# Patient Record
Sex: MALE | Race: WHITE | NOT HISPANIC OR LATINO
[De-identification: names, ages, dates, MRNs, and addresses within clinical notes are randomized per-mention and may not be internally consistent; named-entity substitution may affect disease eponyms.]

---

## 2017-07-25 ENCOUNTER — APPOINTMENT (OUTPATIENT)
Dept: OTOLARYNGOLOGY | Facility: CLINIC | Age: 57
End: 2017-07-25

## 2017-07-25 VITALS
SYSTOLIC BLOOD PRESSURE: 143 MMHG | DIASTOLIC BLOOD PRESSURE: 72 MMHG | BODY MASS INDEX: 22.96 KG/M2 | WEIGHT: 155 LBS | HEIGHT: 69 IN | HEART RATE: 84 BPM

## 2017-07-25 DIAGNOSIS — H69.83 OTHER SPECIFIED DISORDERS OF EUSTACHIAN TUBE, BILATERAL: ICD-10-CM

## 2017-07-25 DIAGNOSIS — H61.22 IMPACTED CERUMEN, LEFT EAR: ICD-10-CM

## 2018-02-18 ENCOUNTER — INPATIENT (INPATIENT)
Facility: HOSPITAL | Age: 58
LOS: 10 days | Discharge: HOME | End: 2018-03-01
Attending: INTERNAL MEDICINE | Admitting: INTERNAL MEDICINE
Payer: MEDICARE

## 2018-02-18 VITALS
RESPIRATION RATE: 20 BRPM | OXYGEN SATURATION: 97 % | TEMPERATURE: 99 F | HEART RATE: 81 BPM | SYSTOLIC BLOOD PRESSURE: 186 MMHG | DIASTOLIC BLOOD PRESSURE: 100 MMHG

## 2018-02-18 DIAGNOSIS — N17.9 ACUTE KIDNEY FAILURE, UNSPECIFIED: ICD-10-CM

## 2018-02-18 DIAGNOSIS — G40.409 OTHER GENERALIZED EPILEPSY AND EPILEPTIC SYNDROMES, NOT INTRACTABLE, WITHOUT STATUS EPILEPTICUS: ICD-10-CM

## 2018-02-18 DIAGNOSIS — Z29.9 ENCOUNTER FOR PROPHYLACTIC MEASURES, UNSPECIFIED: ICD-10-CM

## 2018-02-18 DIAGNOSIS — R56.9 UNSPECIFIED CONVULSIONS: ICD-10-CM

## 2018-02-18 DIAGNOSIS — F79 UNSPECIFIED INTELLECTUAL DISABILITIES: ICD-10-CM

## 2018-02-18 LAB
ALBUMIN SERPL ELPH-MCNC: 4.3 G/DL — SIGNIFICANT CHANGE UP (ref 3.3–5)
ALP SERPL-CCNC: 50 U/L — SIGNIFICANT CHANGE UP (ref 40–120)
ALT FLD-CCNC: 30 U/L — SIGNIFICANT CHANGE UP (ref 4–41)
APPEARANCE UR: CLEAR — SIGNIFICANT CHANGE UP
AST SERPL-CCNC: 29 U/L — SIGNIFICANT CHANGE UP (ref 4–40)
BASOPHILS # BLD AUTO: 0.03 K/UL — SIGNIFICANT CHANGE UP (ref 0–0.2)
BASOPHILS NFR BLD AUTO: 0.2 % — SIGNIFICANT CHANGE UP (ref 0–2)
BILIRUB SERPL-MCNC: 0.2 MG/DL — SIGNIFICANT CHANGE UP (ref 0.2–1.2)
BILIRUB UR-MCNC: NEGATIVE — SIGNIFICANT CHANGE UP
BLOOD UR QL VISUAL: NEGATIVE — SIGNIFICANT CHANGE UP
BUN SERPL-MCNC: 29 MG/DL — HIGH (ref 7–23)
CALCIUM SERPL-MCNC: 9.7 MG/DL — SIGNIFICANT CHANGE UP (ref 8.4–10.5)
CHLORIDE SERPL-SCNC: 92 MMOL/L — LOW (ref 98–107)
CK MB BLD-MCNC: 3.88 NG/ML — SIGNIFICANT CHANGE UP (ref 1–6.6)
CK MB BLD-MCNC: SIGNIFICANT CHANGE UP (ref 0–2.5)
CK SERPL-CCNC: 88 U/L — SIGNIFICANT CHANGE UP (ref 30–200)
CO2 SERPL-SCNC: 25 MMOL/L — SIGNIFICANT CHANGE UP (ref 22–31)
COLOR SPEC: SIGNIFICANT CHANGE UP
CREAT SERPL-MCNC: 1.67 MG/DL — HIGH (ref 0.5–1.3)
EOSINOPHIL # BLD AUTO: 0 K/UL — SIGNIFICANT CHANGE UP (ref 0–0.5)
EOSINOPHIL NFR BLD AUTO: 0 % — SIGNIFICANT CHANGE UP (ref 0–6)
GLUCOSE SERPL-MCNC: 112 MG/DL — HIGH (ref 70–99)
GLUCOSE UR-MCNC: NEGATIVE — SIGNIFICANT CHANGE UP
HCT VFR BLD CALC: 42 % — SIGNIFICANT CHANGE UP (ref 39–50)
HGB BLD-MCNC: 13.9 G/DL — SIGNIFICANT CHANGE UP (ref 13–17)
HYALINE CASTS # UR AUTO: SIGNIFICANT CHANGE UP (ref 0–?)
IMM GRANULOCYTES # BLD AUTO: 0.07 # — SIGNIFICANT CHANGE UP
IMM GRANULOCYTES NFR BLD AUTO: 0.6 % — SIGNIFICANT CHANGE UP (ref 0–1.5)
KETONES UR-MCNC: NEGATIVE — SIGNIFICANT CHANGE UP
LEUKOCYTE ESTERASE UR-ACNC: NEGATIVE — SIGNIFICANT CHANGE UP
LYMPHOCYTES # BLD AUTO: 1.62 K/UL — SIGNIFICANT CHANGE UP (ref 1–3.3)
LYMPHOCYTES # BLD AUTO: 13.4 % — SIGNIFICANT CHANGE UP (ref 13–44)
MAGNESIUM SERPL-MCNC: 1.9 MG/DL — SIGNIFICANT CHANGE UP (ref 1.6–2.6)
MCHC RBC-ENTMCNC: 32 PG — SIGNIFICANT CHANGE UP (ref 27–34)
MCHC RBC-ENTMCNC: 33.1 % — SIGNIFICANT CHANGE UP (ref 32–36)
MCV RBC AUTO: 96.8 FL — SIGNIFICANT CHANGE UP (ref 80–100)
MONOCYTES # BLD AUTO: 0.98 K/UL — HIGH (ref 0–0.9)
MONOCYTES NFR BLD AUTO: 8.1 % — SIGNIFICANT CHANGE UP (ref 2–14)
MUCOUS THREADS # UR AUTO: SIGNIFICANT CHANGE UP
NEUTROPHILS # BLD AUTO: 9.43 K/UL — HIGH (ref 1.8–7.4)
NEUTROPHILS NFR BLD AUTO: 77.7 % — HIGH (ref 43–77)
NITRITE UR-MCNC: NEGATIVE — SIGNIFICANT CHANGE UP
NRBC # FLD: 0 — SIGNIFICANT CHANGE UP
PH UR: 7.5 — SIGNIFICANT CHANGE UP (ref 4.6–8)
PHOSPHATE SERPL-MCNC: 4 MG/DL — SIGNIFICANT CHANGE UP (ref 2.5–4.5)
PLATELET # BLD AUTO: 253 K/UL — SIGNIFICANT CHANGE UP (ref 150–400)
PMV BLD: 11.3 FL — SIGNIFICANT CHANGE UP (ref 7–13)
POTASSIUM SERPL-MCNC: 4.5 MMOL/L — SIGNIFICANT CHANGE UP (ref 3.5–5.3)
POTASSIUM SERPL-SCNC: 4.5 MMOL/L — SIGNIFICANT CHANGE UP (ref 3.5–5.3)
PROT SERPL-MCNC: 8 G/DL — SIGNIFICANT CHANGE UP (ref 6–8.3)
PROT UR-MCNC: 20 MG/DL — SIGNIFICANT CHANGE UP
RBC # BLD: 4.34 M/UL — SIGNIFICANT CHANGE UP (ref 4.2–5.8)
RBC # FLD: 12.6 % — SIGNIFICANT CHANGE UP (ref 10.3–14.5)
RBC CASTS # UR COMP ASSIST: SIGNIFICANT CHANGE UP (ref 0–?)
SODIUM SERPL-SCNC: 135 MMOL/L — SIGNIFICANT CHANGE UP (ref 135–145)
SP GR SPEC: 1.01 — SIGNIFICANT CHANGE UP (ref 1–1.04)
TROPONIN T SERPL-MCNC: < 0.06 NG/ML — SIGNIFICANT CHANGE UP (ref 0–0.06)
UROBILINOGEN FLD QL: NORMAL MG/DL — SIGNIFICANT CHANGE UP
VALPROATE SERPL-MCNC: 51.2 UG/ML — SIGNIFICANT CHANGE UP (ref 50–100)
WBC # BLD: 12.13 K/UL — HIGH (ref 3.8–10.5)
WBC # FLD AUTO: 12.13 K/UL — HIGH (ref 3.8–10.5)
WBC UR QL: SIGNIFICANT CHANGE UP (ref 0–?)

## 2018-02-18 PROCEDURE — 99223 1ST HOSP IP/OBS HIGH 75: CPT | Mod: GC

## 2018-02-18 PROCEDURE — 71045 X-RAY EXAM CHEST 1 VIEW: CPT | Mod: 26

## 2018-02-18 PROCEDURE — 70450 CT HEAD/BRAIN W/O DYE: CPT | Mod: 26

## 2018-02-18 RX ORDER — LEVETIRACETAM 250 MG/1
750 TABLET, FILM COATED ORAL EVERY 12 HOURS
Qty: 0 | Refills: 0 | Status: DISCONTINUED | OUTPATIENT
Start: 2018-02-18 | End: 2018-02-19

## 2018-02-18 RX ORDER — HEPARIN SODIUM 5000 [USP'U]/ML
5000 INJECTION INTRAVENOUS; SUBCUTANEOUS EVERY 12 HOURS
Qty: 0 | Refills: 0 | Status: DISCONTINUED | OUTPATIENT
Start: 2018-02-18 | End: 2018-02-20

## 2018-02-18 RX ORDER — LACOSAMIDE 50 MG/1
150 TABLET ORAL EVERY 12 HOURS
Qty: 0 | Refills: 0 | Status: DISCONTINUED | OUTPATIENT
Start: 2018-02-18 | End: 2018-02-20

## 2018-02-18 RX ORDER — LAMOTRIGINE 25 MG/1
250 TABLET, ORALLY DISINTEGRATING ORAL EVERY 12 HOURS
Qty: 0 | Refills: 0 | Status: DISCONTINUED | OUTPATIENT
Start: 2018-02-18 | End: 2018-03-01

## 2018-02-18 RX ORDER — DOCUSATE SODIUM 100 MG
100 CAPSULE ORAL
Qty: 0 | Refills: 0 | Status: DISCONTINUED | OUTPATIENT
Start: 2018-02-18 | End: 2018-03-01

## 2018-02-18 RX ORDER — POLYETHYLENE GLYCOL 3350 17 G/17G
17 POWDER, FOR SOLUTION ORAL DAILY
Qty: 0 | Refills: 0 | Status: DISCONTINUED | OUTPATIENT
Start: 2018-02-18 | End: 2018-03-01

## 2018-02-18 RX ORDER — METRONIDAZOLE 7.5 MG/G
1 GEL VAGINAL
Qty: 0 | Refills: 0 | Status: DISCONTINUED | OUTPATIENT
Start: 2018-02-18 | End: 2018-03-01

## 2018-02-18 RX ORDER — SODIUM CHLORIDE 9 MG/ML
1000 INJECTION INTRAMUSCULAR; INTRAVENOUS; SUBCUTANEOUS
Qty: 0 | Refills: 0 | Status: DISCONTINUED | OUTPATIENT
Start: 2018-02-18 | End: 2018-02-19

## 2018-02-18 RX ORDER — SODIUM CHLORIDE 9 MG/ML
500 INJECTION INTRAMUSCULAR; INTRAVENOUS; SUBCUTANEOUS ONCE
Qty: 0 | Refills: 0 | Status: COMPLETED | OUTPATIENT
Start: 2018-02-18 | End: 2018-02-18

## 2018-02-18 RX ORDER — DIVALPROEX SODIUM 500 MG/1
500 TABLET, DELAYED RELEASE ORAL EVERY 8 HOURS
Qty: 0 | Refills: 0 | Status: DISCONTINUED | OUTPATIENT
Start: 2018-02-18 | End: 2018-03-01

## 2018-02-18 RX ADMIN — DIVALPROEX SODIUM 500 MILLIGRAM(S): 500 TABLET, DELAYED RELEASE ORAL at 21:19

## 2018-02-18 RX ADMIN — SODIUM CHLORIDE 75 MILLILITER(S): 9 INJECTION INTRAMUSCULAR; INTRAVENOUS; SUBCUTANEOUS at 19:20

## 2018-02-18 RX ADMIN — SODIUM CHLORIDE 500 MILLILITER(S): 9 INJECTION INTRAMUSCULAR; INTRAVENOUS; SUBCUTANEOUS at 11:02

## 2018-02-18 NOTE — CONSULT NOTE ADULT - SUBJECTIVE AND OBJECTIVE BOX
Neurology Consult    Name  MAKAYLA CAMARENA    Patient is a 57 year old Male w/ PMHx of intellectual disability, and seizure disorder [facility] w/ seizures. Report of patient having 7 seizures since yesterday. Last two seizures this morning between 8 and 930 as per EMS. The last seizure at 930am "lasted longer, was different, and he vomited with the seizure".  Staff member from group home reports that the recent seizures are not patient's typical seizure.  Patient has not been ill recently, and no history of recent trauma.  	                                                          MEDICATIONS  (STANDING):    MEDICATIONS  (PRN):      Allergies    caffeine (Unknown)  chocolate (Unknown)  No Known Drug Allergies    Intolerances        Objective  Vital Signs Last 24 Hrs  T(C): 37 (18 Feb 2018 10:53), Max: 37 (18 Feb 2018 10:33)  T(F): 98.6 (18 Feb 2018 10:53), Max: 98.6 (18 Feb 2018 10:33)  HR: 73 (18 Feb 2018 13:16) (73 - 81)  BP: 190/88 (18 Feb 2018 13:16) (185/92 - 190/88)  BP(mean): --  RR: 18 (18 Feb 2018 13:16) (18 - 20)  SpO2: 98% (18 Feb 2018 13:16) (97% - 98%)    General Exam   General appearance: No acute distress, well-nourished  Respiratory:    non-labored respirations               Neurological Exam  Mental Status:  alert and oriented x3, fluent speech, following commands, repetition and naming intact    Cranial Nerves: PERRL, EOMI without nystagmus, visual fields intact no facial droop, no dysarthria    Motor:   Tone:   normal               Strength:  Upper extremity                          Delt       Bicep    Tricep                                                  R         5/5        5/5        5/5       5/5                                               L          5/5        5/5        5/5      5/5    Lower extremity                           HF          KE          KF        DF         PF                                               R        5/5        5/5        5/5       5/5       5/5                                               L         5/5        5/5        5/5      5/5        5/5    Pronator drift:   none           Dysmetria: none with finger-to-nose testing  Tremor:  none appreciated at rest or in action    Sensation: intact grossly to light touch    Deep Tendon Reflexes:  2+ throughout  Toes flexor bilaterally       Other Studies    02-18    135  |  92<L>  |  29<H>  ----------------------------<  112<H>  4.5   |  25  |  1.67<H>    Ca    9.7      18 Feb 2018 11:12    TPro  8.0  /  Alb  4.3  /  TBili  0.2  /  DBili  x   /  AST  29  /  ALT  30  /  AlkPhos  50  02-18 02-18    135  |  92<L>  |  29<H>  ----------------------------<  112<H>  4.5   |  25  |  1.67<H>    Ca    9.7      18 Feb 2018 11:12    TPro  8.0  /  Alb  4.3  /  TBili  0.2  /  DBili  x   /  AST  29  /  ALT  30  /  AlkPhos  50  02-18    LIVER FUNCTIONS - ( 18 Feb 2018 11:12 )  Alb: 4.3 g/dL / Pro: 8.0 g/dL / ALK PHOS: 50 u/L / ALT: 30 u/L / AST: 29 u/L / GGT: x             Radiology    CTH:

## 2018-02-18 NOTE — H&P ADULT - ASSESSMENT
57 M with PMH of intellectual disability and seizure disorder BIBEMS from group home presenting w/ increased tonic clonic seizure activity, likely ____. 57 M with PMH of intellectual disability and seizure disorder BIBEMS from group home presenting w/ increased tonic clonic seizure activity, potentially precipitated by a recent viral gastroenteritis or antiepileptic medication changes.

## 2018-02-18 NOTE — ED PROVIDER NOTE - ATTENDING CONTRIBUTION TO CARE
Attending Statement: I have personally seen and examined this patient. I have fully participated in the care of this patient. I have reviewed all pertinent clinical information, including history physical exam, plan and the Resident's note and agree except as noted  see HPI/PE

## 2018-02-18 NOTE — H&P ADULT - ATTENDING COMMENTS
pt seen and examined w/ team 2/18 at 7p  brother at bedside  pt in NAD eating dinner  will f/u w neuro re: AED's  f/u EEG   micheal likely d/t vomiting on day of adm  IV fluids  unclear if szr hx changing d/t advancing age of cerebral palsy pt vs. due to other underlying triggers like infection- will d/w neuro  ct head neg

## 2018-02-18 NOTE — H&P ADULT - PROBLEM SELECTOR PLAN 3
- serum creatinine is significantly elevated from previous admissions  - suspect likely pre-renal given that patient had recent episodes of emesis  - will start trial of IVNS at 75cc/hr x 12 hr, and recheck AM BMP

## 2018-02-18 NOTE — H&P ADULT - PROBLEM SELECTOR PLAN 2
- continue using helmet  - continue with bilateral leg braces while ambulating  - continue fall precautions

## 2018-02-18 NOTE — ED PROVIDER NOTE - PHYSICAL EXAMINATION
Attending  pt pleasant, cooperative, Alert to self, place and time. nontoxic appearing. dry mm w dried vomit on chin. no sign of facial trauma EOMI. no facial asymmetry. normal S1-S2 coarse BS, no retractions. soft nt abdomen. moves all ext. nl  bl hand. nl strength of bl lower ext. no leg swelling. follows commands.

## 2018-02-18 NOTE — ED PROVIDER NOTE - PROGRESS NOTE DETAILS
Called [facility] [phone] asked for supervisor Blanca, asked to call [phone], left a voice mail. Called [phone] spoke with clinical coordinator Bozena (sp?) states that concern pt had a seizure and he "lost consciousness" no further information available. referred to call Blanca. Called again the [phone] left message again Jonathan Weil, PGY1 - spoke anahy/ [facility] () who is familiar with the patient. She relates he had 4 seizures since 2100 yesterday. She describes the two this morning as generalized tonic-clonic seizures with post-ictal periods. This is different from his typical seizure pattern, wherein his gaze appears to go off into the distance and he is briefly unresponsive, but has no tonic-clonic activity. He suffered no falls or trauma recently. He has had no recent complaints such as fever, cough, dysuria, or pain. He was seen in the hospital December 2017 for increased seizure frequency with an increase in his depakote level, and then again 2 weeks ago with an increase in vimpat dose. He is scheduled for an EEG on 2/27. pt remained stable, no further seizure in ED. neurology consulted. IVF given will admit to medicine for PAMELA.

## 2018-02-18 NOTE — H&P ADULT - NSHPREVIEWOFSYSTEMS_GEN_ALL_CORE
ROS  Gen:  no fevers, no chills, no weight loss  HEENT: no vision changes, no hearing loss, no pharyngitis, no oral lesions  Pulm: no cough, no SOB, no sputum production, no wheezing  Cardiac: no chest pain, no SOB, no orthopnea  GI: no abdominal pain, + N, + V, no diarrhea, no constipation  : no dysuria, no hematuria, no decreased urine output  Skin: no skin lesions  Neuro:  no headache, no focal weakness, no numbness, + LOC with seizures, + post-ictal period

## 2018-02-18 NOTE — H&P ADULT - NSHPLABSRESULTS_GEN_ALL_CORE
CBC Full  -  ( 2018 11:12 )  WBC Count : 12.13 K/uL  Hemoglobin : 13.9 g/dL  Hematocrit : 42.0 %  Platelet Count - Automated : 253 K/uL  Mean Cell Volume : 96.8 fL  Mean Cell Hemoglobin : 32.0 pg  Mean Cell Hemoglobin Concentration : 33.1 %  Auto Neutrophil # : 9.43 K/uL  Auto Lymphocyte # : 1.62 K/uL  Auto Monocyte # : 0.98 K/uL  Auto Eosinophil # : 0.00 K/uL  Auto Basophil # : 0.03 K/uL  Auto Neutrophil % : 77.7 %  Auto Lymphocyte % : 13.4 %  Auto Monocyte % : 8.1 %  Auto Eosinophil % : 0.0 %  Auto Basophil % : 0.2 %        135  |  92<L>  |  29<H>  ----------------------------<  112<H>  4.5   |  25  |  1.67<H>    Ca    9.7      2018 11:12    TPro  8.0  /  Alb  4.3  /  TBili  0.2  /  DBili  x   /  AST  29  /  ALT  30  /  AlkPhos  50  02-18    LIVER FUNCTIONS - ( 2018 11:12 )  Alb: 4.3 g/dL / Pro: 8.0 g/dL / ALK PHOS: 50 u/L / ALT: 30 u/L / AST: 29 u/L / GGT: x           Urinalysis Basic - ( 2018 15:06 )    CARDIAC MARKERS ( 2018 11:12 )  x     / < 0.06 ng/mL / 88 u/L / 3.88 ng/mL / x        Color: PALE YELLOW / Appearance: CLEAR / S.014 / pH: 7.5  Gluc: NEGATIVE / Ketone: NEGATIVE  / Bili: NEGATIVE / Urobili: NORMAL mg/dL   Blood: NEGATIVE / Protein: 20 mg/dL / Nitrite: NEGATIVE   Leuk Esterase: NEGATIVE / RBC: 0-2 / WBC 0-2   Sq Epi: x / Non Sq Epi: x / Bacteria: x    < from: CT Head No Cont (18 @ 12:40) >    Valproic Acid Level, Serum (18 @ 11:12)    Valproic Acid Level, Serum: 51.2 ug/mL    IMPRESSION:  No acute intracranial hemorrhage, mass effect or CT evidence of acute   territorial infarct. No change   from 10/27/2014.    Similar-appearing diffuse symmetric cortical thickening and   under-sulcation, consistent with a congenital neuronal migration anomaly.    < end of copied text > CBC Full  -  ( 2018 11:12 )  WBC Count : 12.13 K/uL  Hemoglobin : 13.9 g/dL  Hematocrit : 42.0 %  Platelet Count - Automated : 253 K/uL  Mean Cell Volume : 96.8 fL  Mean Cell Hemoglobin : 32.0 pg  Mean Cell Hemoglobin Concentration : 33.1 %  Auto Neutrophil # : 9.43 K/uL  Auto Lymphocyte # : 1.62 K/uL  Auto Monocyte # : 0.98 K/uL  Auto Eosinophil # : 0.00 K/uL  Auto Basophil # : 0.03 K/uL  Auto Neutrophil % : 77.7 %  Auto Lymphocyte % : 13.4 %  Auto Monocyte % : 8.1 %  Auto Eosinophil % : 0.0 %  Auto Basophil % : 0.2 %        135  |  92<L>  |  29<H>  ----------------------------<  112<H>  4.5   |  25  |  1.67<H>    Ca    9.7      2018 11:12    TPro  8.0  /  Alb  4.3  /  TBili  0.2  /  DBili  x   /  AST  29  /  ALT  30  /  AlkPhos  50  02-18    LIVER FUNCTIONS - ( 2018 11:12 )  Alb: 4.3 g/dL / Pro: 8.0 g/dL / ALK PHOS: 50 u/L / ALT: 30 u/L / AST: 29 u/L / GGT: x           Urinalysis Basic - ( 2018 15:06 )    CARDIAC MARKERS ( 2018 11:12 )  x     / < 0.06 ng/mL / 88 u/L / 3.88 ng/mL / x        Color: PALE YELLOW / Appearance: CLEAR / S.014 / pH: 7.5  Gluc: NEGATIVE / Ketone: NEGATIVE  / Bili: NEGATIVE / Urobili: NORMAL mg/dL   Blood: NEGATIVE / Protein: 20 mg/dL / Nitrite: NEGATIVE   Leuk Esterase: NEGATIVE / RBC: 0-2 / WBC 0-2   Sq Epi: x / Non Sq Epi: x / Bacteria: x    < from: CT Head No Cont (18 @ 12:40) >    Valproic Acid Level, Serum (18 @ 11:12)    Valproic Acid Level, Serum: 51.2 ug/mL    IMPRESSION:  No acute intracranial hemorrhage, mass effect or CT evidence of acute   territorial infarct. No change   from 10/27/2014.    Similar-appearing diffuse symmetric cortical thickening and   under-sulcation, consistent with a congenital neuronal migration anomaly.    < end of copied text >    Labs are notable for leukocytosis of 12.13, with mild neutrophil predominance of 77%. BMP is remarkable for elevation in serum creatinine of 1.67, compared to baseline of 0.8 during previous admissions. Cardiac enzymes were negative. CK was WNL. UA is negative for UTI. Valproic acid level was 51.2, which per neurology team is within the therapeutic range.     Chest X-ray is notable for underinflated but otherwise clear lungs. No pleural effusions or pneumothorax is noted.     CTH found no acute ICH, mass effect or acute territorial infarct.

## 2018-02-18 NOTE — H&P ADULT - NSHPSOCIALHISTORY_GEN_ALL_CORE
Patient living at group home.   No history Patient living at [facility] home.   No history of tobacco, alcohol or drug use

## 2018-02-18 NOTE — H&P ADULT - PROBLEM SELECTOR PLAN 1
Patient has longstanding history of seizures since childhood, and is currently on a 4 AEDs. His previous seizures were characterized with mild shaking/tremors, but pt most recent has been having tonic-clonic seizures.   It is unclear if gastroenteritis or other infectious etiology may have triggered seizure activity vs recent changes in AEDs.   - UA and CXR did not reveal source of infection, thus suspect that leukocytosis is likely reactive from seizure activity   - q4h neuro checks, aspiration precautions, fall precautions, seizure precautions   - will order EEG  - will restart home AED regimen and reassess with Dr. Lennon in AM

## 2018-02-18 NOTE — ED ADULT TRIAGE NOTE - CHIEF COMPLAINT QUOTE
from adult group home, s/p 2 seizures this morning 840 and 930, 2nd seizure more activity then normal. total 5 seizure since yesterday. recent change of medications pmhx cp, seizures

## 2018-02-18 NOTE — CONSULT NOTE ADULT - ATTENDING COMMENTS
Patient was presented on rounds and examined.  He is unable to give a history and is at baseline according to group home escort. Current AED's include depakote 500 bid, lamictal 250 bid, vimpat 150 bid and keppra 750 bid. Last VPA level 51.2.  Undergoing VEEG today.  Will speak with treating neurologist regarding meds. CTH stable with ventriculomegaly and cortical thickening.

## 2018-02-18 NOTE — ED PROVIDER NOTE - OBJECTIVE STATEMENT
Attending  58yo M OWEN from [facility] home co seizure. Report of pt having 7 seizures since yesterday. Last two seizures this morning between 8 and 930 as per EMS. Last seizure at 930am "lasted longer , was different, and he vomited with the seizure" Staff with pt does not know pt or any hx.   pt states that "I had a seizure and I threw up" Now denies any complaints. Denies any nausea, has no pain. Alert to self, place and time. No family w pt.   Denies any recent trauma. no fever.

## 2018-02-18 NOTE — ED PROVIDER NOTE - PMH
External hemorrhoids  c/erica Aug, 2013  Hypertension    Hyponatremia    Mental retardation    Seizure

## 2018-02-18 NOTE — ED ADULT NURSE NOTE - OBJECTIVE STATEMENT
Pt sent from group home for 2 seizures today and 3 yesterday. Pt alert and oriented at this time with staff member at bedside. Pt noted to have vomit on his clothing. Pt denies chest pain, SOB, abdominal pain, or hitting his head.

## 2018-02-18 NOTE — H&P ADULT - HISTORY OF PRESENT ILLNESS
57 M with PMH of intellectual disability and seizure disorder BIBEMS from senior care presenting w/ seizures. Per group home  ([facility]), patient had 4 seizure episodes since 9 pm on 2/17. On morning of admission patient with 2 episodes of tonic clonic seizures with post-ictal period. Patient prior seizure episodes more consistent with absent seizures (briefly unresponsive with staring off into distance). No recent head trauma or falls. Patient not noted to have fevers, chills, nausea, vomiting, diarrhea, or foul smelling urine. Patient's last hospitalization in 12/2017 at which time patient's depakote dosing was increased. Patient had increase in seziure actvity 2 weeks ago at which time his Vimpat dosing was increased. Patient was scheduled to have outpatient EEG on 2/27/2018. 57 M with PMH of intellectual disability and seizure disorder [facility]. Per group home  ([facility]), patient had 4 seizure episodes since 9 pm on 2/17. On morning of admission patient with 2 episodes of tonic clonic seizures with post-ictal period and emesis. Patient prior seizure episodes more consistent with absent seizures (briefly unresponsive with staring off into distance). No recent head trauma or falls. Patient not noted to have fevers, chills, nausea, vomiting, diarrhea, or foul smelling urine. Patient's last hospitalization in 12/2017 at which time patient's depakote dosing was increased. Patient had increase in seziure actvity 2 weeks ago at which time his Vimpat dosing was increased. Patient was scheduled to have outpatient EEG on 2/27/2018.     In ED, T 98, HR: 81, BP: 149/89, RR: 18 with 99% on RA. 57 M with PMH of intellectual disability and seizure disorder BIBEMS from [facility] presenting w/ seizures. Per group home  ([facility]), patient had 4 seizure episodes since 9 pm on 2/17. On morning of admission patient with 2 episodes of tonic clonic seizures with post-ictal period and emesis. Patient prior seizure episodes more consistent with absent seizures (briefly unresponsive with staring off into distance). No recent head trauma or falls. Patient not noted to have fevers, chills, nausea, vomiting, diarrhea, or foul smelling urine. Patient's last hospitalization in 12/2017 at which time patient's depakote dosing was increased. Patient had increase in seziure actvity 2 weeks ago at which time his Vimpat dosing was increased. Patient was scheduled to have outpatient EEG on 2/27/2018.   The patient's brother at baseline states that the patient has had increased seizure activity. Pt in the past had small seizures, characterized by "shaking" that occurred spontaneously and intermittently (every few months). The seizures that the patient experienced this morning was unusual in that the pt had an episode of emesis, LOC and eyes rolling backwards. At baseline, the pt can communicate his name and birthdate. He has difficulty expressing himself. He ambulates with a walker because of unsteady gait, and requires assistance with toileting and feeding.     In ED, T 98, HR: 81, BP: 149/89, RR: 18 with 99% on RA.     Pt's brother- Khoa Blood is 57 M with PMH of intellectual disability and seizure disorder BIBEMS from [facility] presenting w/ seizures. Per group home  ([facility]), patient had 4 seizure episodes since 9 pm on 2/17. On morning of admission patient with 2 episodes of tonic clonic seizures with post-ictal period and emesis. Patient prior seizure episodes more consistent with absent seizures (briefly unresponsive with staring off into distance). No recent head trauma or falls. Patient not noted to have fevers, chills, nausea, vomiting, diarrhea, or foul smelling urine. Patient's last hospitalization in 12/2017 at which time patient's depakote dosing was increased. Patient had increase in seziure actvity 2 weeks ago at which time his Vimpat dosing was increased. Patient was scheduled to have outpatient EEG on 2/27/2018.   The patient's brother at baseline states that the patient has had increased seizure activity. Pt in the past had small seizures, characterized by "shaking" that occurred spontaneously and intermittently (every few months). The seizures that the patient experienced this morning was unusual in that the pt had an episode of emesis, LOC and eyes rolling backwards. At baseline, the pt can communicate his name and birthdate. He has difficulty expressing himself. He ambulates with a walker because of unsteady gait, and requires assistance with toileting and feeding.     In ED, T 98, HR: 81, BP: 149/89, RR: 18 with 99% on RA.

## 2018-02-18 NOTE — H&P ADULT - NSHPPHYSICALEXAM_GEN_ALL_CORE
PHYSICAL EXAM:    General: No acute distress.  HEENT: NCAT. PERRL.  EOMI.  No scleral icterus.  Moist MM.  No oropharyngeal exudates.    Neck: Supple.  Full range of motion.  No JVD.  No lymphadenopathy.   Heart: RRR.  Normal S1 and S2.  No murmurs   Lungs: CTAB. No wheezes, crackles, or rhonchi.    Abdomen: BS+, soft, NT/ND.  No organomegaly.  Skin: Warm and dry.  No rashes.  Extremities: No edema, clubbing, or cyanosis.  2+ peripheral pulses b/l.  Musculoskeletal: No deformities.  No spinal or paraspinal tenderness.  Neuro: A&Ox3.  CN II-XII intact.  5/5 strength in UE and LE b/l.  Tactile sensation intact in UE and LE b/l.  Cerebellar function intact as assessed by finger-to-nose test. PHYSICAL EXAM:    General: No acute distress.  HEENT: NCAT. PERRL.  EOMI.  No scleral icterus.  Moist MM.  No oropharyngeal exudates.    Neck: Supple.  Full range of motion.  No JVD.  No lymphadenopathy. Scars at back of neck suggestive of previous surgery, small mobile spongey mass at posterior neck above C6  Heart: RRR.  Normal S1 and S2.  No murmurs   Lungs: CTAB. No wheezes, crackles, or rhonchi.    Abdomen: BS+, soft, NT/ND.  No organomegaly.  Skin: Warm and dry.  No rashes.  Extremities: No edema, clubbing, or cyanosis.  2+ peripheral pulses b/l.  Musculoskeletal: No deformities.  No spinal or paraspinal tenderness.  Neuro: A&Ox3.  CN II-XII intact.  5/5 strength in UE and LE b/l.  Tactile sensation intact in UE and LE b/l.  Cerebellar function intact as assessed by finger-to-nose test. Responds appropriately to simple questions

## 2018-02-19 LAB
ALBUMIN SERPL ELPH-MCNC: 3.8 G/DL — SIGNIFICANT CHANGE UP (ref 3.3–5)
ALP SERPL-CCNC: 50 U/L — SIGNIFICANT CHANGE UP (ref 40–120)
ALT FLD-CCNC: 25 U/L — SIGNIFICANT CHANGE UP (ref 4–41)
AST SERPL-CCNC: 26 U/L — SIGNIFICANT CHANGE UP (ref 4–40)
BASE EXCESS BLDA CALC-SCNC: 4 MMOL/L — SIGNIFICANT CHANGE UP
BASOPHILS # BLD AUTO: 0.07 K/UL — SIGNIFICANT CHANGE UP (ref 0–0.2)
BASOPHILS NFR BLD AUTO: 0.8 % — SIGNIFICANT CHANGE UP (ref 0–2)
BILIRUB SERPL-MCNC: 0.2 MG/DL — SIGNIFICANT CHANGE UP (ref 0.2–1.2)
BUN SERPL-MCNC: 17 MG/DL — SIGNIFICANT CHANGE UP (ref 7–23)
BUN SERPL-MCNC: 24 MG/DL — HIGH (ref 7–23)
CA-I BLDA-SCNC: 1.15 MMOL/L — SIGNIFICANT CHANGE UP (ref 1.15–1.29)
CALCIUM SERPL-MCNC: 8.9 MG/DL — SIGNIFICANT CHANGE UP (ref 8.4–10.5)
CALCIUM SERPL-MCNC: 9 MG/DL — SIGNIFICANT CHANGE UP (ref 8.4–10.5)
CHLORIDE SERPL-SCNC: 97 MMOL/L — LOW (ref 98–107)
CHLORIDE SERPL-SCNC: 98 MMOL/L — SIGNIFICANT CHANGE UP (ref 98–107)
CK MB BLD-MCNC: 2.68 NG/ML — SIGNIFICANT CHANGE UP (ref 1–6.6)
CK SERPL-CCNC: 80 U/L — SIGNIFICANT CHANGE UP (ref 30–200)
CO2 SERPL-SCNC: 25 MMOL/L — SIGNIFICANT CHANGE UP (ref 22–31)
CO2 SERPL-SCNC: 29 MMOL/L — SIGNIFICANT CHANGE UP (ref 22–31)
CREAT SERPL-MCNC: 0.9 MG/DL — SIGNIFICANT CHANGE UP (ref 0.5–1.3)
CREAT SERPL-MCNC: 1.14 MG/DL — SIGNIFICANT CHANGE UP (ref 0.5–1.3)
EOSINOPHIL # BLD AUTO: 0.08 K/UL — SIGNIFICANT CHANGE UP (ref 0–0.5)
EOSINOPHIL NFR BLD AUTO: 0.9 % — SIGNIFICANT CHANGE UP (ref 0–6)
GLUCOSE BLDA-MCNC: 153 MG/DL — HIGH (ref 70–99)
GLUCOSE SERPL-MCNC: 144 MG/DL — HIGH (ref 70–99)
GLUCOSE SERPL-MCNC: 90 MG/DL — SIGNIFICANT CHANGE UP (ref 70–99)
HCO3 BLDA-SCNC: 28 MMOL/L — HIGH (ref 22–26)
HCT VFR BLD CALC: 36.9 % — LOW (ref 39–50)
HCT VFR BLD CALC: 38.8 % — LOW (ref 39–50)
HCT VFR BLDA CALC: 40.4 % — SIGNIFICANT CHANGE UP (ref 39–51)
HGB BLD-MCNC: 12.4 G/DL — LOW (ref 13–17)
HGB BLD-MCNC: 12.9 G/DL — LOW (ref 13–17)
HGB BLDA-MCNC: 13.1 G/DL — SIGNIFICANT CHANGE UP (ref 13–17)
IMM GRANULOCYTES # BLD AUTO: 0.03 # — SIGNIFICANT CHANGE UP
IMM GRANULOCYTES NFR BLD AUTO: 0.3 % — SIGNIFICANT CHANGE UP (ref 0–1.5)
INR BLD: 1.26 — HIGH (ref 0.88–1.17)
LACTATE BLDA-SCNC: 1.8 MMOL/L — SIGNIFICANT CHANGE UP (ref 0.5–2)
LYMPHOCYTES # BLD AUTO: 3.05 K/UL — SIGNIFICANT CHANGE UP (ref 1–3.3)
LYMPHOCYTES # BLD AUTO: 34.7 % — SIGNIFICANT CHANGE UP (ref 13–44)
MAGNESIUM SERPL-MCNC: 1.7 MG/DL — SIGNIFICANT CHANGE UP (ref 1.6–2.6)
MAGNESIUM SERPL-MCNC: 2.3 MG/DL — SIGNIFICANT CHANGE UP (ref 1.6–2.6)
MCHC RBC-ENTMCNC: 31.7 PG — SIGNIFICANT CHANGE UP (ref 27–34)
MCHC RBC-ENTMCNC: 32.1 PG — SIGNIFICANT CHANGE UP (ref 27–34)
MCHC RBC-ENTMCNC: 33.2 % — SIGNIFICANT CHANGE UP (ref 32–36)
MCHC RBC-ENTMCNC: 33.6 % — SIGNIFICANT CHANGE UP (ref 32–36)
MCV RBC AUTO: 95.3 FL — SIGNIFICANT CHANGE UP (ref 80–100)
MCV RBC AUTO: 95.6 FL — SIGNIFICANT CHANGE UP (ref 80–100)
MONOCYTES # BLD AUTO: 0.88 K/UL — SIGNIFICANT CHANGE UP (ref 0–0.9)
MONOCYTES NFR BLD AUTO: 10 % — SIGNIFICANT CHANGE UP (ref 2–14)
NEUTROPHILS # BLD AUTO: 4.67 K/UL — SIGNIFICANT CHANGE UP (ref 1.8–7.4)
NEUTROPHILS NFR BLD AUTO: 53.3 % — SIGNIFICANT CHANGE UP (ref 43–77)
NRBC # FLD: 0 — SIGNIFICANT CHANGE UP
NRBC # FLD: 0 — SIGNIFICANT CHANGE UP
PCO2 BLDA: 40 MMHG — SIGNIFICANT CHANGE UP (ref 35–48)
PH BLDA: 7.45 PH — SIGNIFICANT CHANGE UP (ref 7.35–7.45)
PHOSPHATE SERPL-MCNC: 2.6 MG/DL — SIGNIFICANT CHANGE UP (ref 2.5–4.5)
PHOSPHATE SERPL-MCNC: 2.9 MG/DL — SIGNIFICANT CHANGE UP (ref 2.5–4.5)
PLATELET # BLD AUTO: 197 K/UL — SIGNIFICANT CHANGE UP (ref 150–400)
PLATELET # BLD AUTO: 240 K/UL — SIGNIFICANT CHANGE UP (ref 150–400)
PMV BLD: 11 FL — SIGNIFICANT CHANGE UP (ref 7–13)
PMV BLD: 11.3 FL — SIGNIFICANT CHANGE UP (ref 7–13)
PO2 BLDA: 105 MMHG — SIGNIFICANT CHANGE UP (ref 83–108)
POTASSIUM BLDA-SCNC: 4 MMOL/L — SIGNIFICANT CHANGE UP (ref 3.4–4.5)
POTASSIUM SERPL-MCNC: 4.1 MMOL/L — SIGNIFICANT CHANGE UP (ref 3.5–5.3)
POTASSIUM SERPL-MCNC: 4.4 MMOL/L — SIGNIFICANT CHANGE UP (ref 3.5–5.3)
POTASSIUM SERPL-SCNC: 4.1 MMOL/L — SIGNIFICANT CHANGE UP (ref 3.5–5.3)
POTASSIUM SERPL-SCNC: 4.4 MMOL/L — SIGNIFICANT CHANGE UP (ref 3.5–5.3)
PROT SERPL-MCNC: 7.2 G/DL — SIGNIFICANT CHANGE UP (ref 6–8.3)
PROTHROM AB SERPL-ACNC: 14.6 SEC — HIGH (ref 9.8–13.1)
RBC # BLD: 3.86 M/UL — LOW (ref 4.2–5.8)
RBC # BLD: 4.07 M/UL — LOW (ref 4.2–5.8)
RBC # FLD: 12.4 % — SIGNIFICANT CHANGE UP (ref 10.3–14.5)
RBC # FLD: 12.7 % — SIGNIFICANT CHANGE UP (ref 10.3–14.5)
SAO2 % BLDA: 97.8 % — SIGNIFICANT CHANGE UP (ref 95–99)
SODIUM BLDA-SCNC: 130 MMOL/L — LOW (ref 136–146)
SODIUM SERPL-SCNC: 137 MMOL/L — SIGNIFICANT CHANGE UP (ref 135–145)
SODIUM SERPL-SCNC: 137 MMOL/L — SIGNIFICANT CHANGE UP (ref 135–145)
TROPONIN T SERPL-MCNC: < 0.06 NG/ML — SIGNIFICANT CHANGE UP (ref 0–0.06)
WBC # BLD: 8.75 K/UL — SIGNIFICANT CHANGE UP (ref 3.8–10.5)
WBC # BLD: 8.78 K/UL — SIGNIFICANT CHANGE UP (ref 3.8–10.5)
WBC # FLD AUTO: 8.75 K/UL — SIGNIFICANT CHANGE UP (ref 3.8–10.5)
WBC # FLD AUTO: 8.78 K/UL — SIGNIFICANT CHANGE UP (ref 3.8–10.5)

## 2018-02-19 PROCEDURE — 95819 EEG AWAKE AND ASLEEP: CPT | Mod: 26

## 2018-02-19 PROCEDURE — 99233 SBSQ HOSP IP/OBS HIGH 50: CPT

## 2018-02-19 PROCEDURE — 93010 ELECTROCARDIOGRAM REPORT: CPT

## 2018-02-19 PROCEDURE — 71045 X-RAY EXAM CHEST 1 VIEW: CPT | Mod: 26

## 2018-02-19 PROCEDURE — 93010 ELECTROCARDIOGRAM REPORT: CPT | Mod: 76

## 2018-02-19 RX ORDER — LEVETIRACETAM 250 MG/1
250 TABLET, FILM COATED ORAL ONCE
Qty: 0 | Refills: 0 | Status: COMPLETED | OUTPATIENT
Start: 2018-02-19 | End: 2018-02-19

## 2018-02-19 RX ORDER — LEVETIRACETAM 250 MG/1
1000 TABLET, FILM COATED ORAL
Qty: 0 | Refills: 0 | Status: DISCONTINUED | OUTPATIENT
Start: 2018-02-19 | End: 2018-02-20

## 2018-02-19 RX ORDER — LEVETIRACETAM 250 MG/1
1000 TABLET, FILM COATED ORAL ONCE
Qty: 0 | Refills: 0 | Status: COMPLETED | OUTPATIENT
Start: 2018-02-19 | End: 2018-02-19

## 2018-02-19 RX ORDER — SODIUM CHLORIDE 9 MG/ML
1000 INJECTION INTRAMUSCULAR; INTRAVENOUS; SUBCUTANEOUS
Qty: 0 | Refills: 0 | Status: DISCONTINUED | OUTPATIENT
Start: 2018-02-19 | End: 2018-02-19

## 2018-02-19 RX ORDER — ACETAMINOPHEN 500 MG
650 TABLET ORAL ONCE
Qty: 0 | Refills: 0 | Status: COMPLETED | OUTPATIENT
Start: 2018-02-19 | End: 2018-02-19

## 2018-02-19 RX ORDER — SODIUM CHLORIDE 9 MG/ML
1000 INJECTION INTRAMUSCULAR; INTRAVENOUS; SUBCUTANEOUS
Qty: 0 | Refills: 0 | Status: DISCONTINUED | OUTPATIENT
Start: 2018-02-19 | End: 2018-02-21

## 2018-02-19 RX ADMIN — METRONIDAZOLE 1 APPLICATION(S): 7.5 GEL VAGINAL at 17:27

## 2018-02-19 RX ADMIN — Medication 650 MILLIGRAM(S): at 23:55

## 2018-02-19 RX ADMIN — Medication 100 MILLIGRAM(S): at 17:27

## 2018-02-19 RX ADMIN — Medication 1 TABLET(S): at 23:18

## 2018-02-19 RX ADMIN — DIVALPROEX SODIUM 500 MILLIGRAM(S): 500 TABLET, DELAYED RELEASE ORAL at 23:18

## 2018-02-19 RX ADMIN — LEVETIRACETAM 250 MILLIGRAM(S): 250 TABLET, FILM COATED ORAL at 19:50

## 2018-02-19 RX ADMIN — METRONIDAZOLE 1 APPLICATION(S): 7.5 GEL VAGINAL at 06:49

## 2018-02-19 RX ADMIN — Medication 100 MILLIGRAM(S): at 06:44

## 2018-02-19 RX ADMIN — POLYETHYLENE GLYCOL 3350 17 GRAM(S): 17 POWDER, FOR SOLUTION ORAL at 12:11

## 2018-02-19 RX ADMIN — Medication 650 MILLIGRAM(S): at 23:25

## 2018-02-19 RX ADMIN — HEPARIN SODIUM 5000 UNIT(S): 5000 INJECTION INTRAVENOUS; SUBCUTANEOUS at 06:44

## 2018-02-19 RX ADMIN — HEPARIN SODIUM 5000 UNIT(S): 5000 INJECTION INTRAVENOUS; SUBCUTANEOUS at 17:27

## 2018-02-19 RX ADMIN — LAMOTRIGINE 250 MILLIGRAM(S): 25 TABLET, ORALLY DISINTEGRATING ORAL at 19:49

## 2018-02-19 RX ADMIN — Medication 1 TABLET(S): at 12:11

## 2018-02-19 RX ADMIN — LEVETIRACETAM 400 MILLIGRAM(S): 250 TABLET, FILM COATED ORAL at 13:58

## 2018-02-19 RX ADMIN — DIVALPROEX SODIUM 500 MILLIGRAM(S): 500 TABLET, DELAYED RELEASE ORAL at 06:44

## 2018-02-19 RX ADMIN — LACOSAMIDE 150 MILLIGRAM(S): 50 TABLET ORAL at 06:44

## 2018-02-19 RX ADMIN — LEVETIRACETAM 750 MILLIGRAM(S): 250 TABLET, FILM COATED ORAL at 06:44

## 2018-02-19 RX ADMIN — LAMOTRIGINE 250 MILLIGRAM(S): 25 TABLET, ORALLY DISINTEGRATING ORAL at 08:07

## 2018-02-19 RX ADMIN — DIVALPROEX SODIUM 500 MILLIGRAM(S): 500 TABLET, DELAYED RELEASE ORAL at 14:55

## 2018-02-19 RX ADMIN — SODIUM CHLORIDE 250 MILLILITER(S): 9 INJECTION INTRAMUSCULAR; INTRAVENOUS; SUBCUTANEOUS at 20:14

## 2018-02-19 RX ADMIN — LACOSAMIDE 150 MILLIGRAM(S): 50 TABLET ORAL at 17:27

## 2018-02-19 NOTE — PROGRESS NOTE ADULT - ATTENDING COMMENTS
pt seen and examined  agree w/ above  in addition pt getting video EEG  resident spoke w/ neuro in house- getting 1gm of keppra extra; aed's being adjusted  labs better

## 2018-02-19 NOTE — CHART NOTE - NSCHARTNOTEFT_GEN_A_CORE
Rapid response called for seizure    Patient at the time of rapid was on EEG; family witnessed seizure episode and alerted RN; rapid called  At the time of presentation, he no longer had any seizure activity however was postictal but responsive  Vitals showed he was bradycardic - this was not usual for him at baseline  We got an EKG which showed bradycardia with AV dissociation  Neurology was called - did not think it was meds related or seizure related  CCU NP was called at bedside to look at EKG - agreed that it was AV dissociation  He was transferred to CCU for further management    Labs were obtained  CBC BMP CARDIAC ENZYMES PROLACTIN LACTATE    Patient to go to CCU - likely will need pacer after cardiology discussed with EP

## 2018-02-19 NOTE — CHART NOTE - NSCHARTNOTEFT_GEN_A_CORE
Event note    Was made aware by the MAR that a RRT was called for elevated bp in the 200s systolic and bradycardia (~30s). Pt was transferred to CCU level of care and temporary pacer placed by the cardiac team. Pt was reported to have two GTC seizure events, one right before the procedure and the second during. I examined the pt prior these two events, and pt was at his baseline mental status and neurological status [facility]. He was AAOx2, following commands, attentive and interactive. On interval assessment after witnessed GTCs, pt was again alert and oriented to self and place, moving all extremities, no lethargy or change in neuro exam.    Discussed with Cardio to repeat CT head when deemed stable to leave the unit. Will proceed with a low threshold to electively intubate and sedate with propofol if pt has another seizure episode. Will defer for now as pt's neurological exam is stable.   Depakote level ordered, not able to be added on to current labs. Will Follow up.     Spectra [id number] Event note    I was made aware by the MAR that a RRT was called for elevated bp in the 200s systolic and bradycardia (~30s). Pt was transferred to CCU level of care and temporary pacer placed by the cardiac team. Pt was reported to have two GTC seizure events, one right before the pacer placement and the second during the procedure. I examined the pt prior these two gtc events and pt was at his baseline mental status and neurological status according to the aid at the bedside involved in his care at his group home. He was AAOx2, following commands, attentive and interactive. On interval assessment after witnessed GTCs, pt was again alert and oriented to self and place, moving all extremities, no lethargy or change in neuro exam.    Discussed with Cardio to repeat CT head when deemed stable to leave the unit. Will proceed with a low threshold to electively intubate and sedate with propofol if pt has another seizure episode. Will defer for now as pt's neurological exam is stable.     Stat Depakote level ordered, not able to be added on to current labs.     Spectra [id number]

## 2018-02-19 NOTE — PROCEDURE NOTE - NSINFORMCONSENT_GEN_A_CORE
Benefits, risks, and possible complications of procedure explained to patient/caregiver who verbalized understanding and gave written consent./brother

## 2018-02-19 NOTE — CONSULT NOTE ADULT - ASSESSMENT
57M with intellectual disability and long standing seizure disorder presents with increased seizures now s/p RRT for seizure and resulting complete heart block, unclear etiology but patient is hemodynamically stable at present.    D/W Dr. Solorzano, cardio fellow will transfer to CCU for closer monitoring and transvenous pacing.

## 2018-02-19 NOTE — PROGRESS NOTE ADULT - PROBLEM SELECTOR PLAN 3
- Serum creatinine was significantly elevated from previous admissions, now normalized/  - Monitor BMP

## 2018-02-19 NOTE — PROGRESS NOTE ADULT - SUBJECTIVE AND OBJECTIVE BOX
For Night coverage 7pm-7am: NS- page 1443 Team1-3, page [phone] Team4 & Care Model  Sat/[place] Coverage 12pm-7pm: NS- page 1443 for Team1-4, LINCOLN- pager forwarded to covering Resident    CONTACT INFO:  SIVA Perez MD  Internal Medicine PGY1  Pager: [phone]    MAKAYLA CAMARENA  57y  Male    Patient is a 57y old  Male who presents with a chief complaint of Seizures (2018 18:06)    INTERVAL HPI / OVERNIGHT EVENTS: No acute events overnight. Patient seen and evaluated at bedside.       OBJECTIVE:  Vitals Signs (24 Hrs):  T(C): 37.1 (18 @ 06:11), Max: 37.1 (18 @ 06:11)  HR: 73 (18 @ 06:11) (73 - 81)  BP: 178/76 (18 @ 06:11) (149/86 - 190/88)  RR: 18 (18 @ 06:11) (16 - 20)  SpO2: 97% (18 @ 06:11) (97% - 99%)    PHYSICAL EXAM:  General: Comfortable, no apparent distress  HEENT: Atraumatic; EOMI, PERRLA, conjunctiva and sclera clear; no tonsillar erythema/exudates/enlargement  Neck: Supple; no JVD; thyroid normal without masses or enlargement  Chest/Lungs: Clear to auscultation B/L; no rales, rhonchi or wheezing  Heart: Regular rate and rhythm; normal S1/S2; no murmurs, rubs, or gallops  Abdomen: Soft, nontender, nondistended; bowel sounds present  Extremities: PT/DP pulses 2+ B/L; no LE edema  Skin: No rashes or lesions  Neurological: Alert and oriented to person/place/time    LABS:                        12.4   8.75  )-----------( 197      ( 2018 06:30 )             36.9     -    137  |  98  |  17  ----------------------------<  90  4.1   |  29  |  0.90    Ca    9.0      2018 06:30  Phos  2.6     02-  Mg     2.3     -    TPro  8.0  /  Alb  4.3  /  TBili  0.2  /  DBili  x   /  AST  29  /  ALT  30  /  AlkPhos  50        Urinalysis Basic - ( 2018 15:06 )    Color: PALE YELLOW / Appearance: CLEAR / S.014 / pH: 7.5  Gluc: NEGATIVE / Ketone: NEGATIVE  / Bili: NEGATIVE / Urobili: NORMAL mg/dL   Blood: NEGATIVE / Protein: 20 mg/dL / Nitrite: NEGATIVE   Leuk Esterase: NEGATIVE / RBC: 0-2 / WBC 0-2   Sq Epi: x / Non Sq Epi: x / Bacteria: x      CAPILLARY BLOOD GLUCOSE            RADIOLOGY & ADDITIONAL TESTS:    MEDICATIONS:  calcium carbonate  625 mG + Vitamin D (OsCal 250 + D) 1 Tablet(s) Oral two times a day  diVALproex  milliGRAM(s) Oral every 8 hours  docusate sodium 100 milliGRAM(s) Oral two times a day  heparin  Injectable 5000 Unit(s) SubCutaneous every 12 hours  lacosamide 150 milliGRAM(s) Oral every 12 hours  lamoTRIgine 250 milliGRAM(s) Oral every 12 hours  levETIRAcetam 750 milliGRAM(s) Oral every 12 hours  metroNIDAZOLE 0.75% Cream 1 Application(s) Topical two times a day  multivitamin 1 Tablet(s) Oral daily  polyethylene glycol 3350 17 Gram(s) Oral daily  sodium chloride 0.9%. 1000 milliLiter(s) (75 mL/Hr) IV Continuous <Continuous>      ALLERGIES:  caffeine (Unknown)  chocolate (Unknown)  No Known Drug Allergies      Labs and imaging personally reviewed and interpreted [  ]  Consult notes reviewed   Case discussed with consultants For Night coverage 7pm-7am: NS- page 1443 Team1-3, page 1446 Team4 & Care Model  Sat/[place] Coverage 12pm-7pm: NS- page 1443 for Team1-4, LINCOLN- pager forwarded to covering Resident    CONTACT INFO:  SIVA Perez MD  Internal Medicine PGY1  Pager: [phone]    MAKAYLA CAMARENA  57y  Male    Patient is a 57y old  Male who presents with a chief complaint of Seizures (2018 18:06)    INTERVAL HPI / OVERNIGHT EVENTS: No seizure events overnight. Patient seen and evaluated at bedside eating breakfast. Unable to assess ROS.       OBJECTIVE:  Vitals Signs (24 Hrs):  T(C): 37.1 (18 @ 06:11), Max: 37.1 (18 @ 06:11)  HR: 73 (18 @ 06:11) (73 - 81)  BP: 178/76 (18 @ 06:11) (149/86 - 190/88)  RR: 18 (18 @ 06:11) (16 - 20)  SpO2: 97% (18 @ 06:11) (97% - 99%)    PHYSICAL EXAM:  General: Comfortable, no apparent distress  HEENT: Atraumatic; EOMI, PERRLA, conjunctiva and sclera clear; no tonsillar erythema/exudates/enlargement  Neck: Supple; no JVD; thyroid normal without masses or enlargement  Chest/Lungs: Clear to auscultation B/L; no rales, rhonchi or wheezing  Heart: Regular rate and rhythm; normal S1/S2; no murmurs, rubs, or gallops  Abdomen: Soft, nontender, nondistended; bowel sounds present  Extremities: PT/DP pulses 2+ B/L; no LE edema  Skin: No rashes or lesions  Neurological: Alert and oriented to person/place/time    LABS:                        12.4   8.75  )-----------( 197      ( 2018 06:30 )             36.9     -    137  |  98  |  17  ----------------------------<  90  4.1   |  29  |  0.90    Ca    9.0      2018 06:30  Phos  2.6     02-  Mg     2.3     -    TPro  8.0  /  Alb  4.3  /  TBili  0.2  /  DBili  x   /  AST  29  /  ALT  30  /  AlkPhos  50  -      Urinalysis Basic - ( 2018 15:06 )    Color: PALE YELLOW / Appearance: CLEAR / S.014 / pH: 7.5  Gluc: NEGATIVE / Ketone: NEGATIVE  / Bili: NEGATIVE / Urobili: NORMAL mg/dL   Blood: NEGATIVE / Protein: 20 mg/dL / Nitrite: NEGATIVE   Leuk Esterase: NEGATIVE / RBC: 0-2 / WBC 0-2   Sq Epi: x / Non Sq Epi: x / Bacteria: x      CAPILLARY BLOOD GLUCOSE            RADIOLOGY & ADDITIONAL TESTS:    MEDICATIONS:  calcium carbonate  625 mG + Vitamin D (OsCal 250 + D) 1 Tablet(s) Oral two times a day  diVALproex  milliGRAM(s) Oral every 8 hours  docusate sodium 100 milliGRAM(s) Oral two times a day  heparin  Injectable 5000 Unit(s) SubCutaneous every 12 hours  lacosamide 150 milliGRAM(s) Oral every 12 hours  lamoTRIgine 250 milliGRAM(s) Oral every 12 hours  levETIRAcetam 750 milliGRAM(s) Oral every 12 hours  metroNIDAZOLE 0.75% Cream 1 Application(s) Topical two times a day  multivitamin 1 Tablet(s) Oral daily  polyethylene glycol 3350 17 Gram(s) Oral daily  sodium chloride 0.9%. 1000 milliLiter(s) (75 mL/Hr) IV Continuous <Continuous>      ALLERGIES:  caffeine (Unknown)  chocolate (Unknown)  No Known Drug Allergies      Labs and imaging personally reviewed and interpreted [  ]  Consult notes reviewed   Case discussed with consultants

## 2018-02-19 NOTE — EEG REPORT - NS EEG TEXT BOX
[facility]  Report of Routine EEG with Video    [facility]: [address], [address], [place] [address], Phone: [phone]  [facility]: [id number], [place] [address], Phone: [phone]  Office: [address] [facility], [facility] [address], [place] [address], Phone: [phone]    Patient Name: MAKAYLA CAMARENA    Age: 57 y  : 1960  Patient ID: -, MRN #: -, Location: [facility]  Referring Physician: JO ANN GLASER    EEG #: [id number]  Study Date: 2018		    Technical Information:					  On Instrument: -  Placement and Labeling of Electrodes:  The EEG was performed utilizing 20 channels referential EEG connections (coronal over temporal over parasagittal montage) using all standard 10-20 electrode placements with EKG.  Recording was at a sampling rate of 256 samples per second per channel.  Time synchronized digital video recording was done simultaneously with EEG recording.  A low light infrared camera was used for low light recording.  Joselo and seizure detection algorithms were utilized.    History:  56 YO MALE  HX- SEIZURES, MENTAL RETARTDATION,HYPONATREMIA  CONCERN FOR SEIZURES    Medication	  Depakote	  Keppra	  Vimpat	    Study Interpretation:    FINDINGS:  The background was continuous, spontaneously variable and reactive.  During wakefulness, the posteriorly dominant rhythm consisted of 6 Hz activity, with amplitude to 30 uV, that attenuated to eye opening.      Background Slowing:  Generalized slowing: Continuous Diffuse Delta and Theta Activity   Focal slowing: none was present.    Sleep Background:  Drowsiness was characterized by fragmentation, attenuation, and slowing of the background activity.    Stage II sleep transients were not recorded.    Other Paroxysmal Activity:  None       Ictal Epileptiform Activity or Events:  Multiple, Brief 1-4 sec of 8Hz trains of sharp waves (generalized epileptiform eischarges) with clinical correlate of reported body stiffness.    Activation Procedures:   Hyperventilation was not performed.    Photic stimulation was not performed.    Artifacts:  Intermittent myogenic and movement artifacts were noted.    ECG:  The heart rate on single channel ECG was predominantly between 70-80 BPM.    EEG Classification / Summary:  Abnormal EEG in the awake / drowsy state(s).  - Multiple, brief 1-4 sec generalized epileptiform discharges with clinical correlate of body stiffness.  - generalized background slowing  - disorganization    Clinical Impression:  Findings indicate increase risk for generalized epilepsy. In addition there is mild to moderate diffuse or multifocal cerebral dysfunction.       Cata Fishman MD  Neurophysiology/Epilepsy Fellow, [facility]      Enrique Massey MD PhD  Director, Epilepsy Division, [facility]

## 2018-02-19 NOTE — PROCEDURE NOTE - NSTOLERANCE_GEN_A_CORE
Muscle spasm during procedure, possible seizure, however, following commands under tarp shortly after.

## 2018-02-19 NOTE — PROGRESS NOTE ADULT - PROBLEM SELECTOR PLAN 1
- Patient has longstanding history of seizures since childhood, and is currently on a 4 AEDs. His previous seizures were characterized with mild shaking/tremors, but pt most recent has been having tonic-clonic seizures.   It is unclear if gastroenteritis or other infectious etiology may have triggered seizure activity vs recent changes in AEDs.   - UA and CXR did not reveal source of infection, thus suspect that leukocytosis is likely reactive from seizure activity   - q4h neuro checks, aspiration precautions, fall precautions, seizure precautions   - F/u EEG  - On home AED regimen and reassess with Dr. Lennon

## 2018-02-19 NOTE — CONSULT NOTE ADULT - SUBJECTIVE AND OBJECTIVE BOX
Date of Admission: 2018    CHIEF COMPLAINT: seizures    HISTORY OF PRESENT ILLNESS: 57M with intellectual disability and long h/o seizures presents from Group Home with increased seizure activity and nausea and vomiting. This evening after eating dinner patient was observed by family to have a seizure, an RRT was called once seizure stopped and EKG was done and complete heart block with narrow QRS and VR 30s was noted. Patient awake and alert, VSS. EEG was in progress at this time. Patient denies chest pain, SOB, dizziness, palpitations. D/W Dr. Solorzano, cardio fellow and patient transferred to CCU for closer monitoring and TVP insertion.       Allergies    caffeine (Unknown)  chocolate (Unknown)  No Known Drug Allergies    Intolerances    	    MEDICATIONS:  heparin  Injectable 5000 Unit(s) SubCutaneous every 12 hours        diVALproex  milliGRAM(s) Oral every 8 hours  lacosamide 150 milliGRAM(s) Oral every 12 hours  lamoTRIgine 250 milliGRAM(s) Oral every 12 hours  levETIRAcetam 1000 milliGRAM(s) Oral two times a day  levETIRAcetam 250 milliGRAM(s) Oral once    docusate sodium 100 milliGRAM(s) Oral two times a day  polyethylene glycol 3350 17 Gram(s) Oral daily      calcium carbonate  625 mG + Vitamin D (OsCal 250 + D) 1 Tablet(s) Oral two times a day  metroNIDAZOLE 0.75% Cream 1 Application(s) Topical two times a day  multivitamin 1 Tablet(s) Oral daily  sodium chloride 0.9%. 1000 milliLiter(s) IV Continuous <Continuous>      PAST MEDICAL & SURGICAL HISTORY:  Hyponatremia  External hemorrhoids: c/scopy Aug, 2013  Mental retardation  Hypertension  Seizure  No significant past surgical history      FAMILY HISTORY:  No pertinent family history in first degree relatives      SOCIAL HISTORY:    Smoker  unknown  Alcohol unknown  Drugs unknown      REVIEW OF SYSTEMS:  See HPI. Otherwise, 10 point ROS done and otherwise negative.    PHYSICAL EXAM:  T(C): 36.6 (18 @ 15:06), Max: 37.1 (18 @ 06:11)  HR: 74 (18 @ 15:06) (73 - 80)  BP: 149/87 (18 @ 15:06) (149/86 - 178/76)  RR: 18 (18 @ 15:06) (16 - 18)  SpO2: 98% (18 @ 15:06) (97% - 98%)  Wt(kg): --  I&O's Summary    2018 07:01  -  2018 07:00  --------------------------------------------------------  IN: 0 mL / OUT: 600 mL / NET: -600 mL        Appearance: NAD, skin W & D	  HEENT:   Normal oral mucosa, PERRL, EOMI	  Cardiovascular: nl S1S2, no M/R/C  Respiratory: CTA ant	  Psychiatry: awake, responsive  Gastrointestinal:  Soft, Non-tender, + BS	  Skin: No rashes, No ecchymoses, No cyanosis	  Neurologic: Non-focal  Extremities: Normal range of motion, No clubbing, cyanosis or edema        LABS:	 	                        12.9   8.78  )-----------( 240      ( 2018 18:35 )             38.8           137  |  98  |  17  ----------------------------<  90  4.1   |  29  |  0.90        135  |  92<L>  |  29<H>  ----------------------------<  112<H>  4.5   |  25  |  1.67<H>    Ca    9.0      2018 06:30  Ca    9.7      2018 11:12  Phos  2.6       Phos  4.0       Mg     2.3       Mg     1.9         TPro  8.0  /  Alb  4.3  /  TBili  0.2  /  DBili  x   /  AST  29  /  ALT  30  /  AlkPhos  50        CARDIAC MARKERS:  Troponin T, Serum: < 0.06 ng/mL (18 @ 11:12)      CKMB: 3.88 ng/mL ( @ 11:12)    CKMB Relative Index: Test not performed ( @ 11:12)    	    EC. NSR with 1st degree AVHB 2. post seizure EKG CHB with VR 30s.  RADIOLOGY:  EXAM:  CT BRAIN        PROCEDURE DATE:  2018         INTERPRETATION:  HISTORY: History of seizure.    Technique: CT of the head was performed without intravenous contrast.    Multiple contiguous axial images were acquired from the skullbase to the   vertex without the administration of intravenous contrast.  Coronal and   sagittal reformations were made.    COMPARISON: Prior head CT dated 10/27/2014    FINDINGS:  Similar-appearing diffuse symmetric cortical thickening and   under-sulcation of the cerebral hemispheres consistent with pachygyria.   The lateral ventricles are prominent size.    White matter lucencies are identified bilaterally. There is cerebellar   atrophy with enlargement of the fourth ventricle and the cerebellar folia.     There is no intraparenchymal hematoma, mass effect or midline shift. No   abnormal extra-axial fluid collections are present. There is no CT   evidence of acute transcortical territorial infarction.    The calvarium is intact. The visualized intraorbital compartments,   partially imaged paranasal sinuses and mastoid complexes are free of   acute disease.    IMPRESSION:  No acute intracranial hemorrhage, mass effect or CT evidence of acute   territorial infarct. No change   from 10/27/2014.    Similar-appearing diffuse symmetric cortical thickening and   under-sulcation, consistent with a congenital neuronal migration anomaly.      Large ventricles. Cerebellar atrophy.              YRIS ANDREWS M.D., RADIOLOGY RESIDENT  This document has been electronically signed.  SOL REDMAN M.D., ATTENDING RADIOLOGIST  This document has been electronically signed. 2018  2:04PM

## 2018-02-19 NOTE — PROGRESS NOTE ADULT - ASSESSMENT
57 M with PMH of intellectual disability and seizure disorder BIBEMS from group home presenting w/ increased tonic clonic seizure activity, potentially precipitated by a recent viral gastroenteritis or antiepileptic medication changes.

## 2018-02-20 ENCOUNTER — TRANSCRIPTION ENCOUNTER (OUTPATIENT)
Age: 58
End: 2018-02-20

## 2018-02-20 LAB
BUN SERPL-MCNC: 17 MG/DL — SIGNIFICANT CHANGE UP (ref 7–23)
CALCIUM SERPL-MCNC: 9 MG/DL — SIGNIFICANT CHANGE UP (ref 8.4–10.5)
CHLORIDE SERPL-SCNC: 98 MMOL/L — SIGNIFICANT CHANGE UP (ref 98–107)
CO2 SERPL-SCNC: 28 MMOL/L — SIGNIFICANT CHANGE UP (ref 22–31)
CREAT SERPL-MCNC: 0.84 MG/DL — SIGNIFICANT CHANGE UP (ref 0.5–1.3)
GLUCOSE SERPL-MCNC: 86 MG/DL — SIGNIFICANT CHANGE UP (ref 70–99)
HCT VFR BLD CALC: 38.5 % — LOW (ref 39–50)
HGB BLD-MCNC: 12.9 G/DL — LOW (ref 13–17)
MAGNESIUM SERPL-MCNC: 1.7 MG/DL — SIGNIFICANT CHANGE UP (ref 1.6–2.6)
MCHC RBC-ENTMCNC: 31.8 PG — SIGNIFICANT CHANGE UP (ref 27–34)
MCHC RBC-ENTMCNC: 33.5 % — SIGNIFICANT CHANGE UP (ref 32–36)
MCV RBC AUTO: 94.8 FL — SIGNIFICANT CHANGE UP (ref 80–100)
NRBC # FLD: 0 — SIGNIFICANT CHANGE UP
PHOSPHATE SERPL-MCNC: 2.7 MG/DL — SIGNIFICANT CHANGE UP (ref 2.5–4.5)
PLATELET # BLD AUTO: 222 K/UL — SIGNIFICANT CHANGE UP (ref 150–400)
PMV BLD: 11.1 FL — SIGNIFICANT CHANGE UP (ref 7–13)
POTASSIUM SERPL-MCNC: 4.1 MMOL/L — SIGNIFICANT CHANGE UP (ref 3.5–5.3)
POTASSIUM SERPL-SCNC: 4.1 MMOL/L — SIGNIFICANT CHANGE UP (ref 3.5–5.3)
PROLACTIN SERPL-MCNC: 25.1 NG/ML — HIGH (ref 4.1–18.4)
RBC # BLD: 4.06 M/UL — LOW (ref 4.2–5.8)
RBC # FLD: 12.5 % — SIGNIFICANT CHANGE UP (ref 10.3–14.5)
SODIUM SERPL-SCNC: 137 MMOL/L — SIGNIFICANT CHANGE UP (ref 135–145)
VALPROATE SERPL-MCNC: 41.4 UG/ML — LOW (ref 50–100)
WBC # BLD: 8.88 K/UL — SIGNIFICANT CHANGE UP (ref 3.8–10.5)
WBC # FLD AUTO: 8.88 K/UL — SIGNIFICANT CHANGE UP (ref 3.8–10.5)

## 2018-02-20 PROCEDURE — 71045 X-RAY EXAM CHEST 1 VIEW: CPT | Mod: 26

## 2018-02-20 PROCEDURE — 99223 1ST HOSP IP/OBS HIGH 75: CPT | Mod: GC

## 2018-02-20 PROCEDURE — 95951: CPT | Mod: 26

## 2018-02-20 PROCEDURE — 99233 SBSQ HOSP IP/OBS HIGH 50: CPT

## 2018-02-20 PROCEDURE — 93010 ELECTROCARDIOGRAM REPORT: CPT

## 2018-02-20 PROCEDURE — 93306 TTE W/DOPPLER COMPLETE: CPT | Mod: 26

## 2018-02-20 RX ORDER — MAGNESIUM SULFATE 500 MG/ML
2 VIAL (ML) INJECTION ONCE
Qty: 0 | Refills: 0 | Status: COMPLETED | OUTPATIENT
Start: 2018-02-20 | End: 2018-02-20

## 2018-02-20 RX ORDER — LACOSAMIDE 50 MG/1
75 TABLET ORAL
Qty: 0 | Refills: 0 | Status: DISCONTINUED | OUTPATIENT
Start: 2018-02-20 | End: 2018-02-21

## 2018-02-20 RX ORDER — LEVETIRACETAM 250 MG/1
250 TABLET, FILM COATED ORAL ONCE
Qty: 0 | Refills: 0 | Status: COMPLETED | OUTPATIENT
Start: 2018-02-21 | End: 2018-02-21

## 2018-02-20 RX ORDER — LEVETIRACETAM 250 MG/1
500 TABLET, FILM COATED ORAL
Qty: 0 | Refills: 0 | Status: COMPLETED | OUTPATIENT
Start: 2018-02-20 | End: 2018-02-21

## 2018-02-20 RX ORDER — HYDRALAZINE HCL 50 MG
10 TABLET ORAL ONCE
Qty: 0 | Refills: 0 | Status: COMPLETED | OUTPATIENT
Start: 2018-02-20 | End: 2018-02-20

## 2018-02-20 RX ORDER — HEPARIN SODIUM 5000 [USP'U]/ML
5000 INJECTION INTRAVENOUS; SUBCUTANEOUS EVERY 8 HOURS
Qty: 0 | Refills: 0 | Status: DISCONTINUED | OUTPATIENT
Start: 2018-02-20 | End: 2018-02-21

## 2018-02-20 RX ORDER — ACETAMINOPHEN 500 MG
1000 TABLET ORAL ONCE
Qty: 0 | Refills: 0 | Status: COMPLETED | OUTPATIENT
Start: 2018-02-20 | End: 2018-02-20

## 2018-02-20 RX ORDER — LACOSAMIDE 50 MG/1
50 TABLET ORAL ONCE
Qty: 0 | Refills: 0 | Status: DISCONTINUED | OUTPATIENT
Start: 2018-02-21 | End: 2018-02-21

## 2018-02-20 RX ADMIN — LAMOTRIGINE 250 MILLIGRAM(S): 25 TABLET, ORALLY DISINTEGRATING ORAL at 17:35

## 2018-02-20 RX ADMIN — LACOSAMIDE 75 MILLIGRAM(S): 50 TABLET ORAL at 17:36

## 2018-02-20 RX ADMIN — HEPARIN SODIUM 5000 UNIT(S): 5000 INJECTION INTRAVENOUS; SUBCUTANEOUS at 13:15

## 2018-02-20 RX ADMIN — SODIUM CHLORIDE 10 MILLILITER(S): 9 INJECTION INTRAMUSCULAR; INTRAVENOUS; SUBCUTANEOUS at 05:11

## 2018-02-20 RX ADMIN — Medication 100 MILLIGRAM(S): at 05:40

## 2018-02-20 RX ADMIN — DIVALPROEX SODIUM 500 MILLIGRAM(S): 500 TABLET, DELAYED RELEASE ORAL at 05:40

## 2018-02-20 RX ADMIN — DIVALPROEX SODIUM 500 MILLIGRAM(S): 500 TABLET, DELAYED RELEASE ORAL at 21:18

## 2018-02-20 RX ADMIN — LAMOTRIGINE 250 MILLIGRAM(S): 25 TABLET, ORALLY DISINTEGRATING ORAL at 05:40

## 2018-02-20 RX ADMIN — LEVETIRACETAM 500 MILLIGRAM(S): 250 TABLET, FILM COATED ORAL at 17:36

## 2018-02-20 RX ADMIN — Medication 1000 MILLIGRAM(S): at 05:23

## 2018-02-20 RX ADMIN — Medication 10 MILLIGRAM(S): at 10:07

## 2018-02-20 RX ADMIN — SODIUM CHLORIDE 10 MILLILITER(S): 9 INJECTION INTRAMUSCULAR; INTRAVENOUS; SUBCUTANEOUS at 08:04

## 2018-02-20 RX ADMIN — METRONIDAZOLE 1 APPLICATION(S): 7.5 GEL VAGINAL at 09:20

## 2018-02-20 RX ADMIN — DIVALPROEX SODIUM 500 MILLIGRAM(S): 500 TABLET, DELAYED RELEASE ORAL at 13:14

## 2018-02-20 RX ADMIN — Medication 1 TABLET(S): at 13:14

## 2018-02-20 RX ADMIN — HEPARIN SODIUM 5000 UNIT(S): 5000 INJECTION INTRAVENOUS; SUBCUTANEOUS at 21:18

## 2018-02-20 RX ADMIN — Medication 100 MILLIGRAM(S): at 17:36

## 2018-02-20 RX ADMIN — LEVETIRACETAM 1000 MILLIGRAM(S): 250 TABLET, FILM COATED ORAL at 05:40

## 2018-02-20 RX ADMIN — LACOSAMIDE 150 MILLIGRAM(S): 50 TABLET ORAL at 05:40

## 2018-02-20 RX ADMIN — SODIUM CHLORIDE 75 MILLILITER(S): 9 INJECTION INTRAMUSCULAR; INTRAVENOUS; SUBCUTANEOUS at 02:00

## 2018-02-20 RX ADMIN — Medication 400 MILLIGRAM(S): at 05:08

## 2018-02-20 RX ADMIN — METRONIDAZOLE 1 APPLICATION(S): 7.5 GEL VAGINAL at 17:37

## 2018-02-20 RX ADMIN — POLYETHYLENE GLYCOL 3350 17 GRAM(S): 17 POWDER, FOR SOLUTION ORAL at 13:15

## 2018-02-20 RX ADMIN — SODIUM CHLORIDE 10 MILLILITER(S): 9 INJECTION INTRAMUSCULAR; INTRAVENOUS; SUBCUTANEOUS at 19:19

## 2018-02-20 RX ADMIN — Medication 50 GRAM(S): at 09:20

## 2018-02-20 NOTE — DISCHARGE NOTE ADULT - CARE PLAN
Principal Discharge DX:	Complete heart block  Goal:	To continue follow up with a cardiologist and continue taking your medications as prescribed.  Assessment and plan of treatment:	In this hospitalization you were brought in due to concerns of seizures. You were found to have a problem with the electrical conduction system in your heart. You required a pace maker which was implanted. You never experienced any seizures during this admission. Please follow up with a cardiologist within a week of discharge and continue taking your medications as prescribed.  Secondary Diagnosis:	Seizures  Goal:	To continue taking your medications as prescribed and to follow up with a neurologist.  Assessment and plan of treatment:	You have a history of seizures. You never experienced a seizure during this hospital admission. Please stop taking Keppra and Vimpat. Please continue taking Lamictal and Depakote. Take your medications as prescribed. Please follow up with a neurologist within a week of discharge.  Secondary Diagnosis:	Intellectual disability  Goal:	To continue ongoing care.  Assessment and plan of treatment:	Please continue taking your medications as prescribed. Please continue to follow up with your primary care doctor within two weeks of discharge.

## 2018-02-20 NOTE — DISCHARGE NOTE ADULT - CARE PROVIDER_API CALL
Ok Mccall  Phone: [phone]  Fax: (   )    -    Lianne Brown), Cardiac Electrophysiology; Cardiology; Internal Medicine  [address] [place] [address]  Phone: [phone]  Fax: [phone]

## 2018-02-20 NOTE — PROGRESS NOTE ADULT - SUBJECTIVE AND OBJECTIVE BOX
Epilepsy Follow up note    Subjective: Had an episode of asystole lasting 13s and another right after lasting 14s associated with an episode of tone loss (and possible convulsion) thought to be seizure given hx, no EEG correlate however. Pt now on transvenous pacer with planned implantable pacer.     Objective:   Vital Signs Last 24 Hrs  T(C): 36.7 (20 Feb 2018 07:43), Max: 37.1 (20 Feb 2018 00:00)  T(F): 98 (20 Feb 2018 07:43), Max: 98.8 (20 Feb 2018 00:00)  HR: 80 (20 Feb 2018 10:00) (24 - 98)  BP: 179/94 (20 Feb 2018 10:00) (113/73 - 189/83)  BP(mean): 112 (20 Feb 2018 10:00) (58 - 126)  RR: 14 (20 Feb 2018 10:00) (9 - 24)  SpO2: 97% (20 Feb 2018 10:00) (95% - 100%)      General Exam   General appearance: No acute distress, well-nourished, on transcutaneous pacer            Neurological Exam  Mental Status:  alert and oriented x2 (baseline), speech is limited, following commands    Cranial Nerves: PERRL, EOMI without nystagmus, visual fields intact grossly, no facial droop, no dysarthria    Motor:   Tone: normal               Strength: moving all extremities equally    Pronator drift: none b/l  Tremor:  none appreciated at rest or in action    Sensation: intact grossly to light touch throughout    Deep Tendon Reflexes:  2+ throughout  Toes flexor bilaterally      02-20    137  |  98  |  17  ----------------------------<  86  4.1   |  28  |  0.84    Ca    9.0      20 Feb 2018 05:50  Phos  2.7     02-20  Mg     1.7     02-20    TPro  7.2  /  Alb  3.8  /  TBili  0.2  /  DBili  x   /  AST  26  /  ALT  25  /  AlkPhos  50  02-19    02-20    137  |  98  |  17  ----------------------------<  86  4.1   |  28  |  0.84    Ca    9.0      20 Feb 2018 05:50  Phos  2.7     02-20  Mg     1.7     02-20    TPro  7.2  /  Alb  3.8  /  TBili  0.2  /  DBili  x   /  AST  26  /  ALT  25  /  AlkPhos  50  02-19    LIVER FUNCTIONS - ( 19 Feb 2018 18:35 )  Alb: 3.8 g/dL / Pro: 7.2 g/dL / ALK PHOS: 50 u/L / ALT: 25 u/L / AST: 26 u/L / GGT: x                                 12.9   8.88  )-----------( 222      ( 20 Feb 2018 05:50 )             38.5     MEDICATIONS  (STANDING):  calcium carbonate 1250 mG + Vitamin D (OsCal 500 + D) 1 Tablet(s) Oral daily  diVALproex  milliGRAM(s) Oral every 8 hours  docusate sodium 100 milliGRAM(s) Oral two times a day  heparin  Injectable 5000 Unit(s) SubCutaneous every 8 hours  lacosamide 150 milliGRAM(s) Oral every 12 hours  lamoTRIgine 250 milliGRAM(s) Oral every 12 hours  levETIRAcetam 1000 milliGRAM(s) Oral two times a day  metroNIDAZOLE 0.75% Cream 1 Application(s) Topical two times a day  multivitamin 1 Tablet(s) Oral daily  polyethylene glycol 3350 17 Gram(s) Oral daily  sodium chloride 0.9%. 1000 milliLiter(s) (10 mL/Hr) IV Continuous <Continuous>    MEDICATIONS  (PRN): Epilepsy Consult Note    Subjective: Had an episode of asystole lasting 13s and another right after lasting 14s associated with an episode of unresponsiveness that was thought to be seizure given hx; however, no sz activity seen on EEG. Pt now on transvenous pacer with planned implantable pacer.     Objective:   Vital Signs Last 24 Hrs  T(C): 36.7 (20 Feb 2018 07:43), Max: 37.1 (20 Feb 2018 00:00)  T(F): 98 (20 Feb 2018 07:43), Max: 98.8 (20 Feb 2018 00:00)  HR: 80 (20 Feb 2018 10:00) (24 - 98)  BP: 179/94 (20 Feb 2018 10:00) (113/73 - 189/83)  BP(mean): 112 (20 Feb 2018 10:00) (58 - 126)  RR: 14 (20 Feb 2018 10:00) (9 - 24)  SpO2: 97% (20 Feb 2018 10:00) (95% - 100%)    ROS:  Unable to obtain as patient is intellectually disabled.    GENERAL PHYSICAL EXAM:  GEN: well appearing, well nourished, no distress, cooperative, on transcutaneous pacer    HEENT:  NCAT, OP clear  EYES: sclera white, conjunctiva clear, no nystagmus  NECK: supple  CV: RRR, no murmur     		  PULM: CTAB, no wheezing  GI: soft ABD, +BS, NT  EXT: peripheral pulse intact, no edema, no clubbing, no cyanosis  MSK: muscle tone and strength normal  SKIN: warm, dry, no rash or lesion on exposed skin     NEUROLOGICAL EXAM:  Mental Status:  alert and oriented x2 (baseline), speech is limited, following commands    Cranial Nerves: PERRL, EOMI without nystagmus, visual fields intact grossly, no facial droop, no dysarthria    Motor:   Tone: normal               Strength: moving all extremities equally    Pronator drift: none b/l  Tremor:  none appreciated at rest or in action    Sensation: intact grossly to light touch throughout    Deep Tendon Reflexes:  2+ throughout  Toes flexor bilaterally      02-20    137  |  98  |  17  ----------------------------<  86  4.1   |  28  |  0.84    Ca    9.0      20 Feb 2018 05:50  Phos  2.7     02-20  Mg     1.7     02-20    TPro  7.2  /  Alb  3.8  /  TBili  0.2  /  DBili  x   /  AST  26  /  ALT  25  /  AlkPhos  50  02-19 02-20    137  |  98  |  17  ----------------------------<  86  4.1   |  28  |  0.84    Ca    9.0      20 Feb 2018 05:50  Phos  2.7     02-20  Mg     1.7     02-20    TPro  7.2  /  Alb  3.8  /  TBili  0.2  /  DBili  x   /  AST  26  /  ALT  25  /  AlkPhos  50  02-19    LIVER FUNCTIONS - ( 19 Feb 2018 18:35 )  Alb: 3.8 g/dL / Pro: 7.2 g/dL / ALK PHOS: 50 u/L / ALT: 25 u/L / AST: 26 u/L / GGT: x                                 12.9   8.88  )-----------( 222      ( 20 Feb 2018 05:50 )             38.5     MEDICATIONS  (STANDING):  calcium carbonate 1250 mG + Vitamin D (OsCal 500 + D) 1 Tablet(s) Oral daily  diVALproex  milliGRAM(s) Oral every 8 hours  docusate sodium 100 milliGRAM(s) Oral two times a day  heparin  Injectable 5000 Unit(s) SubCutaneous every 8 hours  lacosamide 150 milliGRAM(s) Oral every 12 hours  lamoTRIgine 250 milliGRAM(s) Oral every 12 hours  levETIRAcetam 1000 milliGRAM(s) Oral two times a day  metroNIDAZOLE 0.75% Cream 1 Application(s) Topical two times a day  multivitamin 1 Tablet(s) Oral daily  polyethylene glycol 3350 17 Gram(s) Oral daily  sodium chloride 0.9%. 1000 milliLiter(s) (10 mL/Hr) IV Continuous <Continuous>    MEDICATIONS  (PRN):

## 2018-02-20 NOTE — CONSULT NOTE ADULT - SUBJECTIVE AND OBJECTIVE BOX
Patient seen and evaluated at bedside    Chief Complaint:  seizures    HPI:  57M with intellectual disability and seizure disorder BIBEMS from group home presenting w/seizures. Patient had a seizure episode in the hospital, during which his HR was noted to be in the 30s. EKG after the episode showed complete heart block. Patient was transferred to the CCU and TVP was inserted. Now patient is in NSR with HR 60-70s. Patient denying symptoms of dizziness, weakness, SOB.    PMH:   Hyponatremia  External hemorrhoids  Mental retardation  Hypertension  Seizure      PSH:   No significant past surgical history      Medications:   calcium carbonate 1250 mG + Vitamin D (OsCal 500 + D) 1 Tablet(s) Oral daily  diVALproex  milliGRAM(s) Oral every 8 hours  docusate sodium 100 milliGRAM(s) Oral two times a day  heparin  Injectable 5000 Unit(s) SubCutaneous every 8 hours  lacosamide 75 milliGRAM(s) Oral two times a day  lamoTRIgine 250 milliGRAM(s) Oral every 12 hours  levETIRAcetam 500 milliGRAM(s) Oral two times a day  metroNIDAZOLE 0.75% Cream 1 Application(s) Topical two times a day  multivitamin 1 Tablet(s) Oral daily  polyethylene glycol 3350 17 Gram(s) Oral daily  sodium chloride 0.9%. 1000 milliLiter(s) IV Continuous <Continuous>      Allergies:  caffeine (Unknown)  chocolate (Unknown)  No Known Drug Allergies      FAMILY HISTORY:  No pertinent family history in first degree relatives      Social History:  Smoking History: denies  Alcohol Use: denies  Drug Use: denies    Review of Systems:  REVIEW OF SYSTEMS:    CONSTITUTIONAL: No weakness, fevers or chills  EYES/ENT: No visual changes;  No dysphagia  RESPIRATORY: No cough, wheezing, hemoptysis; No shortness of breath  CARDIOVASCULAR: No chest pain or palpitations; No lower extremity edema  GASTROINTESTINAL: No abdominal or epigastric pain. No nausea, vomiting, or hematemesis; No diarrhea or constipation. No melena or hematochezia.  BACK: No back pain  GENITOURINARY: No dysuria, frequency or hematuria  NEUROLOGICAL: No numbness or weakness  SKIN: No itching, burning, rashes, or lesions   All other review of systems is negative unless indicated above.    Physical Exam:  T(F): 98 (02-20), Max: 98.8 (02-20)  HR: 70 (02-20) (24 - 98)  BP: 122/42 (02-20) (113/73 - 189/83)  RR: 21 (02-20)  SpO2: 97% (02-20)  GENERAL: No acute distress, well-developed  HEAD:  Atraumatic, Normocephalic  ENT: EOMI, No JVD, moist mucosa, TVP in place  CHEST/LUNG: Clear to auscultation bilaterally; No wheeze, equal breath sounds bilaterally   HEART: Regular rate and rhythm; No murmurs, rubs, or gallops  ABDOMEN: Soft, Nontender, Nondistended; Bowel sounds present  EXTREMITIES:  No clubbing, cyanosis, or edema  PSYCH: Nl behavior, nl affect  NEUROLOGY: AAOx3, non-focal    Cardiovascular Diagnostic Testing:    ECG: Personally reviewed  Complete heart block, narrow QRS    Labs: Personally reviewed                        12.9   8.88  )-----------( 222      ( 20 Feb 2018 05:50 )             38.5     02-20    137  |  98  |  17  ----------------------------<  86  4.1   |  28  |  0.84    Ca    9.0      20 Feb 2018 05:50  Phos  2.7     02-20  Mg     1.7     02-20    TPro  7.2  /  Alb  3.8  /  TBili  0.2  /  DBili  x   /  AST  26  /  ALT  25  /  AlkPhos  50  02-19    PT/INR - ( 19 Feb 2018 18:35 )   PT: 14.6 SEC;   INR: 1.26            CARDIAC MARKERS ( 19 Feb 2018 18:35 )  x     / < 0.06 ng/mL / 80 u/L / 2.68 ng/mL / x          A/P:  57M with intellectual disability and seizure disorder found to have complete heart block in setting of seizure.    Complete heart block, resolved. In setting of seizure. S/p TVP.  - Will discuss with attending regarding PPM now that patient is in SR  - Avoid AVN blocking agents Patient seen and evaluated at bedside    Chief Complaint:  seizures    HPI:  57M with intellectual disability and seizure disorder BIBEMS from group home presenting w/seizures. Patient had a seizure episode in the hospital, during which his HR was noted to be in the 30s. EKG after the episode showed complete heart block. Patient was transferred to the CCU and TVP was inserted. Now patient is in NSR with HR 60-70s. Patient denying symptoms of dizziness, weakness, SOB.    PMH:   Hyponatremia  External hemorrhoids  Mental retardation  Hypertension  Seizure      PSH:   No significant past surgical history      Medications:   calcium carbonate 1250 mG + Vitamin D (OsCal 500 + D) 1 Tablet(s) Oral daily  diVALproex  milliGRAM(s) Oral every 8 hours  docusate sodium 100 milliGRAM(s) Oral two times a day  heparin  Injectable 5000 Unit(s) SubCutaneous every 8 hours  lacosamide 75 milliGRAM(s) Oral two times a day  lamoTRIgine 250 milliGRAM(s) Oral every 12 hours  levETIRAcetam 500 milliGRAM(s) Oral two times a day  metroNIDAZOLE 0.75% Cream 1 Application(s) Topical two times a day  multivitamin 1 Tablet(s) Oral daily  polyethylene glycol 3350 17 Gram(s) Oral daily  sodium chloride 0.9%. 1000 milliLiter(s) IV Continuous <Continuous>      Allergies:  caffeine (Unknown)  chocolate (Unknown)  No Known Drug Allergies      FAMILY HISTORY:  No pertinent family history in first degree relatives      Social History:  Smoking History: denies  Alcohol Use: denies  Drug Use: denies    Review of Systems:  REVIEW OF SYSTEMS:    CONSTITUTIONAL: No weakness, fevers or chills  EYES/ENT: No visual changes;  No dysphagia  RESPIRATORY: No cough, wheezing, hemoptysis; No shortness of breath  CARDIOVASCULAR: No chest pain or palpitations; No lower extremity edema  GASTROINTESTINAL: No abdominal or epigastric pain. No nausea, vomiting, or hematemesis; No diarrhea or constipation. No melena or hematochezia.  BACK: No back pain  GENITOURINARY: No dysuria, frequency or hematuria  NEUROLOGICAL: No numbness or weakness  SKIN: No itching, burning, rashes, or lesions   All other review of systems is negative unless indicated above.    Physical Exam:  T(F): 98 (02-20), Max: 98.8 (02-20)  HR: 70 (02-20) (24 - 98)  BP: 122/42 (02-20) (113/73 - 189/83)  RR: 21 (02-20)  SpO2: 97% (02-20)  GENERAL: No acute distress, well-developed  HEAD:  Atraumatic, Normocephalic  ENT: EOMI, No JVD, moist mucosa, TVP in place  CHEST/LUNG: Clear to auscultation bilaterally; No wheeze, equal breath sounds bilaterally   HEART: Regular rate and rhythm; No murmurs, rubs, or gallops  ABDOMEN: Soft, Nontender, Nondistended; Bowel sounds present  EXTREMITIES:  No clubbing, cyanosis, or edema  PSYCH: Nl behavior, nl affect  NEUROLOGY: AAOx3, non-focal    Cardiovascular Diagnostic Testing:    ECG: Personally reviewed  Complete heart block, narrow QRS    Labs: Personally reviewed                        12.9   8.88  )-----------( 222      ( 20 Feb 2018 05:50 )             38.5     02-20    137  |  98  |  17  ----------------------------<  86  4.1   |  28  |  0.84    Ca    9.0      20 Feb 2018 05:50  Phos  2.7     02-20  Mg     1.7     02-20    TPro  7.2  /  Alb  3.8  /  TBili  0.2  /  DBili  x   /  AST  26  /  ALT  25  /  AlkPhos  50  02-19    PT/INR - ( 19 Feb 2018 18:35 )   PT: 14.6 SEC;   INR: 1.26            CARDIAC MARKERS ( 19 Feb 2018 18:35 )  x     / < 0.06 ng/mL / 80 u/L / 2.68 ng/mL / x          A/P:  57M with intellectual disability and seizure disorder found to have complete heart block in setting of seizure.    Complete heart block, resolved. In setting of seizure. S/p TVP.  - Plan for PPM tomorrow  - Avoid AVN blocking agents    Nate Arroyo MD  Cardiology Fellow [id number] Patient seen and evaluated at bedside    Chief Complaint:  seizures    HPI:  57M with intellectual disability and seizure disorder BIBEMS from group home presenting w/seizures. Patient had a seizure episode in the hospital, during which his HR was noted to be in the 30s. EKG after the episode showed complete heart block. Patient was transferred to the CCU and TVP was inserted. Now patient is in NSR with HR 60-70s. Patient denying symptoms of dizziness, weakness, SOB.    PMH:   Hyponatremia  External hemorrhoids  Mental retardation  Hypertension  Seizure      PSH:   No significant past surgical history      Medications:   calcium carbonate 1250 mG + Vitamin D (OsCal 500 + D) 1 Tablet(s) Oral daily  diVALproex  milliGRAM(s) Oral every 8 hours  docusate sodium 100 milliGRAM(s) Oral two times a day  heparin  Injectable 5000 Unit(s) SubCutaneous every 8 hours  lacosamide 75 milliGRAM(s) Oral two times a day  lamoTRIgine 250 milliGRAM(s) Oral every 12 hours  levETIRAcetam 500 milliGRAM(s) Oral two times a day  metroNIDAZOLE 0.75% Cream 1 Application(s) Topical two times a day  multivitamin 1 Tablet(s) Oral daily  polyethylene glycol 3350 17 Gram(s) Oral daily  sodium chloride 0.9%. 1000 milliLiter(s) IV Continuous <Continuous>      Allergies:  caffeine (Unknown)  chocolate (Unknown)  No Known Drug Allergies      FAMILY HISTORY:  No pertinent family history in first degree relatives      Social History:  Smoking History: denies  Alcohol Use: denies  Drug Use: denies    Review of Systems:  REVIEW OF SYSTEMS:    CONSTITUTIONAL: No weakness, fevers or chills  EYES/ENT: No visual changes;  No dysphagia  RESPIRATORY: No cough, wheezing, hemoptysis; No shortness of breath  CARDIOVASCULAR: No chest pain or palpitations; No lower extremity edema  GASTROINTESTINAL: No abdominal or epigastric pain. No nausea, vomiting, or hematemesis; No diarrhea or constipation. No melena or hematochezia.  BACK: No back pain  GENITOURINARY: No dysuria, frequency or hematuria  NEUROLOGICAL: No numbness or weakness  SKIN: No itching, burning, rashes, or lesions   All other review of systems is negative unless indicated above.    Physical Exam:  T(F): 98 (02-20), Max: 98.8 (02-20)  HR: 70 (02-20) (24 - 98)  BP: 122/42 (02-20) (113/73 - 189/83)  RR: 21 (02-20)  SpO2: 97% (02-20)  GENERAL: No acute distress, well-developed  HEAD:  Atraumatic, Normocephalic  ENT: EOMI, No JVD, moist mucosa, TVP in place  CHEST/LUNG: Clear to auscultation bilaterally; No wheeze, equal breath sounds bilaterally   HEART: Regular rate and rhythm; No murmurs, rubs, or gallops  ABDOMEN: Soft, Nontender, Nondistended; Bowel sounds present  EXTREMITIES:  No clubbing, cyanosis, or edema  PSYCH: Nl behavior, nl affect  NEUROLOGY: AAOx3, non-focal    Cardiovascular Diagnostic Testing:    ECG: Personally reviewed  Complete heart block, narrow QRS    Labs: Personally reviewed                        12.9   8.88  )-----------( 222      ( 20 Feb 2018 05:50 )             38.5     02-20    137  |  98  |  17  ----------------------------<  86  4.1   |  28  |  0.84    Ca    9.0      20 Feb 2018 05:50  Phos  2.7     02-20  Mg     1.7     02-20    TPro  7.2  /  Alb  3.8  /  TBili  0.2  /  DBili  x   /  AST  26  /  ALT  25  /  AlkPhos  50  02-19    PT/INR - ( 19 Feb 2018 18:35 )   PT: 14.6 SEC;   INR: 1.26            CARDIAC MARKERS ( 19 Feb 2018 18:35 )  x     / < 0.06 ng/mL / 80 u/L / 2.68 ng/mL / x          A/P:  57M with intellectual disability and seizure disorder found to have complete heart block in setting of seizure.    Complete heart block, resolved. In setting of seizure. S/p TVP.  - Plan for PPM tomorrow; NPO after MN  - Avoid AVN blocking agents    Nate Arroyo MD  Cardiology Fellow [phone]

## 2018-02-20 NOTE — DISCHARGE NOTE ADULT - PLAN OF CARE
To continue follow up with a cardiologist and continue taking your medications as prescribed. In this hospitalization you were brought in due to concerns of seizures. You were found to have a problem with the electrical conduction system in your heart. You required a pace maker which was implanted. You never experienced any seizures during this admission. Please follow up with a cardiologist within a week of discharge and continue taking your medications as prescribed. To continue taking your medications as prescribed and to follow up with a neurologist. You have a history of seizures. You never experienced a seizure during this hospital admission. Please stop taking Keppra and Vimpat. Please continue taking Lamictal and Depakote. Take your medications as prescribed. Please follow up with a neurologist within a week of discharge. To continue ongoing care. Please continue taking your medications as prescribed. Please continue to follow up with your primary care doctor within two weeks of discharge.

## 2018-02-20 NOTE — PROGRESS NOTE ADULT - ASSESSMENT
57 M with PMH of intellectual disability and seizure disorder BIBEMS from group home presenting w/ increased tonic clonic seizure activity, potentially precipitated by a recent viral gastroenteritis or antiepileptic medication changes.       #3rd Degree Heart Block s/p TVP   - Converted to NSR overnight with HR 70-90s   - Continue to monitor     #GTC Seizures   - Patient with longstanding history of seizures on 4 AEDs, continue.   - Video EEG consistent with generalized epilepsy.    - Discuss with Neurology needs for CT Head and Completing vEEG study   - Continue with Q4 Neuro checks, seizure and fall precautions, and aspiration precautions.     #Intellectual Diasbility   - Continue using helmet and bilateral leg braces while ambulating   - Continue with fall precautions    #DVT PPX   - Continue with SQH, will increase to 5000u Q8.        Isadora Graham D.O.  CCU Resident [id number] 57 M with PMH of intellectual disability and seizure disorder BIBEMS from group home presenting w/ increased tonic clonic seizure activity, potentially precipitated by a recent viral gastroenteritis or antiepileptic medication changes.       #3rd Degree Heart Block s/p TVP   - Converted to NSR overnight with HR 70-90s   - Continue to monitor     #GTC Seizures   - Patient with longstanding history of seizures on 4 AEDs, continue.   - Video EEG consistent with generalized epilepsy.    - Discuss with Neurology needs for CT Head and Completing vEEG study   - Continue with Q4 Neuro checks, seizure and fall precautions, and aspiration precautions.     #Intellectual Disability   - Continue using helmet and bilateral leg braces while ambulating   - Continue with fall precautions    #DVT PPX   - Continue with SQH, will increase to 5000u Q8.        Isadora Graham D.O.  CCU Resident [id number]

## 2018-02-20 NOTE — DISCHARGE NOTE ADULT - MEDICATION SUMMARY - MEDICATIONS TO STOP TAKING
I will STOP taking the medications listed below when I get home from the hospital:    Keppra 750 mg oral tablet  -- 2 tab(s) (1500mg) by mouth 2 times a day    Vimpat 150 mg oral tablet  -- 1 tab(s) by mouth 2 times a day

## 2018-02-20 NOTE — PROGRESS NOTE ADULT - ASSESSMENT
Pt is a 57 year old Male w/ a PMH of intellectual disability, and seizure disorder BIBEMS from group home presenting w/ seizure like activity. Neurological exam non-focal. CTH w/o acute pathology. Episode of tone loss (at first thought to be seizure) was on EEG associated with a prolonged episode of asystole lasting 29 seconds and no seizure activity. Likely symptoms are related to his heart block and not seizures.    Recommendations:  - c/w Depakote 500mg q8hr  - c/w Lamictal 250mg q12hr  - Taper down Keppra, 500mg once tonight, 500mg once tomorrow morning and 250mg once tomorrow night, then stop completely  - Taper down Vimpat (can worsen heart block), 75mg once tonight, 75mg once tomorrow morning and 50mg once tomorrow night, then stop completely  - If pt has activity suspicious for seizures please call neurology team at [phone] before giving medications and please attempt to get video recording of episode.  - If needed can have on hold Ativan 2mg IV [id number], max 2 doses, for confirmed sustained seizure activity lasting greater than 3 minutes  - Seizure Precautions  - Will contact outpatient neurologist (Dr. Maldonado) for further hx

## 2018-02-20 NOTE — EEG REPORT - NS EEG TEXT BOX
[facility]   COMPREHENSIVE EPILEPSY CENTER   REPORT OF CONTINUOUS VIDEO EEG     [facility]: [address], [place] [address], Ph#: [phone]  [facility]: [address], [place] [address], Ph#: [phone]  Office: [address] [place] [address], [place] [address], [place] [address] Ph#: [phone]    Patient Name: MAKAYLA CAMARENA  Age and : 57y (56)  MRN #: [id number]  Location: [facility]     Study Date: 18 - 18    _____________________________________________________________  TECHNICAL INFORMATION    EEG Recording Technique:  The patient underwent continuous Video-EEG monitoring, using Telemetry System hardware on the XLTek Digital System. EEG and video data were stored on a computer hard drive with important events saved in digital archive files. The material was reviewed by a physician (electroencephalographer / epileptologist) on a daily basis. Joselo and seizure detection algorithms were utilized and reviewed. An EEG Technician attended to the patient, and was available throughout daytime work hours.  The epilepsy center neurologist was available in person or on call 24-hours per day.    EEG Placement and Labeling of Electrodes:  The EEG was performed utilizing 20 channel referential EEG connections (coronal over temporal over parasagittal montage) using all standard 10-20 electrode placements with EKG, with additional electrodes placed in the inferior temporal region using the modified 10-10 montage electrode placements for elective admissions, or if deemed necessary. Recording was at a sampling rate of 256 samples per second per channel. Time synchronized digital video recording was done simultaneously with EEG recording. A low light infrared camera was used for low light recording.     _____________________________________________________________  HISTORY:  Patient is a 57y old  Male who presents with a chief complaint of Seizures (2018 18:06), found to have paroxysmal 3rd AV block.    PERTINENT MEDICATION:  diVALproex  milliGRAM(s) Oral every 8 hours  lacosamide 75 milliGRAM(s) Oral two times a day  lamoTRIgine 250 milliGRAM(s) Oral every 12 hours  levETIRAcetam 500 milliGRAM(s) Oral two times a day    _____________________________________________________________  STUDY INTERPRETATION    Background:  The background was continuous, spontaneously variable, reactive, and predominantly consisted of theta, polymorphic delta, and alpha activities. During wakefulness, the posterior dominant rhythm consisted of symmetric 6 Hz activity, with an amplitude up to 50 uV, that attenuated to eye opening.      Sleep:  - Drowsiness was characterized by slowing of the background activity and decreased EMG artifacts.    - Sleep transients were characterized by the presence of symmetric K-complexes.   - Dyshormia was also present and characterized by 0.5s of polyspikes followed by K-complexes.    Non-epileptiform Paroxysmal Abnormalities:  Occasional 2-4s bursts of 100-170uV sharply contoured theta or alpha waveforms, typically occurring during drowsiness.     Interictal Epileptiform Abnormalities:   None    Ictal Abnormalities:   One habitual event occurred from 18:08:18 - 18:09:18. Two minutes before the event, HR changed from RRR 80-90 BPM to AV block. Then at 18:08:18, pt went into ventricular asystole, but he was still eating food at this time. Twelve seconds later, pt suddenly fell into bed, lying there motionless. There was a single QRS complex 13s after the start of ventricular asystole. Pt remained unresponsive to tactile stimulus. There was a second QRS complex 27s after the start of ventricular asystole. Pt remained motionless. HR resumed 35s after the start of ventricular asystole. Pt didn't resume behavior for another 24s, but he was immediately back to baseline. EEG during the event changed from baseline background to diffuse polymorphic delta to ~20s of absence of discernible brain wave activity to diffuse polymorphic delta, to baseline background. No epileptiform discharges or seizures were seen during the event.    Artifacts:  Intermittent myogenic and movement artifacts were noted.    ECG:  The heart rate on single channel ECG was predominantly between 0-90 BPM. AV block and ventricular asystole as described above.    _____________________________________________________________  EEG CLASSIFICATION/SUMMARY:    Abnormal EEG in the awake, drowsy, and asleep states due to  - One habitual event occurred from 18:08:18 - 18:09:18. Two minutes before the event, HR changed from RRR 80-90 BPM to AV block. Then at 18:08:18, pt went into ventricular asystole, but he was still eating food at this time. Twelve seconds later, pt suddenly fell into bed, lying there motionless. There was a single QRS complex 13s after the start of ventricular asystole. Pt remained unresponsive to tactile stimulus. There was a second QRS complex 27s after the start of ventricular asystole. Pt remained motionless. HR resumed 35s after the start of ventricular asystole. Pt didn't resume behavior for another 24s, but he was immediately back to baseline. EEG during the event changed from baseline background to diffuse polymorphic delta to ~20s of absence of discernible brain wave activity to diffuse polymorphic delta, to baseline background. No epileptiform discharges or seizures were seen during the event.    - Occasional 2-4s bursts of 100-170uV sharply contoured theta or alpha waveforms, typically occurring during drowsiness.   - Presence of dyshormia during sleep.  - Mild to moderate generalized slowing.    _____________________________________________________________  CLINICAL IMPRESSION:    Abnormal EEG study in the awake, drowsy, and asleep states.   1. One habitual event captured at 18:08 characterized clinically by sudden unresponsiveness, falling into bed, and lack of motor activity. There was no epileptiform discharges or sz correlating with this event. However, this event did correlate with ventricular asystole seen on single channel EKG.  2. There were bursts of high amplitude sharply contoured waveforms of unclear significance, largely during drowsiness.    3. There were sleep structures that suggest underlying seizure disorder.   4. Mild to moderate nonspecific diffuse or multifocal cerebral dysfunction.   5. No seizure seen.    AV block and ventricular asystole seen on single channel EKG.    Findings discussed with primary team.      Miya Shoemaker MD  Attending Physician, [facility] Epilepsy [facility]   COMPREHENSIVE EPILEPSY CENTER   REPORT OF CONTINUOUS VIDEO EEG     [facility]: [address], [place] [address], Ph#: [phone]  [facility]: [address], [place] [address], Ph#: [phone]  Office: [address] [place] [address], [place] [address], [place] [address] Ph#: [phone]    Patient Name: MAKAYLA CAMARENA  Age and : 57y (56)  MRN #: [id number]  Location: [facility]     Study Date: 18     _____________________________________________________________  TECHNICAL INFORMATION    EEG Recording Technique:  The patient underwent continuous Video-EEG monitoring, using Telemetry System hardware on the XLTek Digital System. EEG and video data were stored on a computer hard drive with important events saved in digital archive files. The material was reviewed by a physician (electroencephalographer / epileptologist) on a daily basis. Joselo and seizure detection algorithms were utilized and reviewed. An EEG Technician attended to the patient, and was available throughout daytime work hours.  The epilepsy center neurologist was available in person or on call 24-hours per day.    EEG Placement and Labeling of Electrodes:  The EEG was performed utilizing 20 channel referential EEG connections (coronal over temporal over parasagittal montage) using all standard 10-20 electrode placements with EKG, with additional electrodes placed in the inferior temporal region using the modified 10-10 montage electrode placements for elective admissions, or if deemed necessary. Recording was at a sampling rate of 256 samples per second per channel. Time synchronized digital video recording was done simultaneously with EEG recording. A low light infrared camera was used for low light recording.     _____________________________________________________________  HISTORY:  Patient is a 57y old  Male who presents with a chief complaint of Seizures (2018 18:06), found to have paroxysmal 3rd AV block.    PERTINENT MEDICATION:  diVALproex  milliGRAM(s) Oral every 8 hours  lacosamide 75 milliGRAM(s) Oral two times a day  lamoTRIgine 250 milliGRAM(s) Oral every 12 hours  levETIRAcetam 500 milliGRAM(s) Oral two times a day    _____________________________________________________________  STUDY INTERPRETATION    Background:  The background was continuous, spontaneously variable, reactive, and predominantly consisted of theta, polymorphic delta, and alpha activities. During wakefulness, the posterior dominant rhythm consisted of symmetric 6 Hz activity, with an amplitude up to 50 uV, that attenuated to eye opening.      Sleep:  - Drowsiness was characterized by slowing of the background activity and decreased EMG artifacts.    - Sleep transients were characterized by the presence of symmetric K-complexes.   - Dyshormia was also present and characterized by 0.5s of polyspikes followed by K-complexes.    Non-epileptiform Paroxysmal Abnormalities:  Occasional 2-4s bursts of 100-170uV sharply contoured theta or alpha waveforms, typically occurring during drowsiness.     Interictal Epileptiform Abnormalities:   None    Ictal Abnormalities:   One habitual event occurred from 18:08:18 - 18:09:18. Two minutes before the event, HR changed from RRR 80-90 BPM to AV block. Then at 18:08:18, pt went into ventricular asystole, but he was still eating food at this time. Twelve seconds later, pt suddenly fell into bed, lying there motionless. There was a single QRS complex 13s after the start of ventricular asystole. Pt remained unresponsive to tactile stimulus. There was a second QRS complex 27s after the start of ventricular asystole. Pt remained motionless. HR resumed 35s after the start of ventricular asystole. Pt didn't resume behavior for another 24s, but he was immediately back to baseline. EEG during the event changed from baseline background to diffuse polymorphic delta to ~20s of absence of discernible brain wave activity to diffuse polymorphic delta, to baseline background. No epileptiform discharges or seizures were seen during the event.    Artifacts:  Intermittent myogenic and movement artifacts were noted. EEG discontinued around 20:00 on 18 as pt was transported to CCU.    ECG:  The heart rate on single channel ECG was predominantly between 0-90 BPM. AV block and ventricular asystole as described above.    _____________________________________________________________  EEG CLASSIFICATION/SUMMARY:    Abnormal EEG in the awake, drowsy, and asleep states due to  - One habitual event occurred from 18:08:18 - 18:09:18. Two minutes before the event, HR changed from RRR 80-90 BPM to AV block. Then at 18:08:18, pt went into ventricular asystole, but he was still eating food at this time. Twelve seconds later, pt suddenly fell into bed, lying there motionless. There was a single QRS complex 13s after the start of ventricular asystole. Pt remained unresponsive to tactile stimulus. There was a second QRS complex 27s after the start of ventricular asystole. Pt remained motionless. HR resumed 35s after the start of ventricular asystole. Pt didn't resume behavior for another 24s, but he was immediately back to baseline. EEG during the event changed from baseline background to diffuse polymorphic delta to ~20s of absence of discernible brain wave activity to diffuse polymorphic delta, to baseline background. No epileptiform discharges or seizures were seen during the event.    - Occasional 2-4s bursts of 100-170uV sharply contoured theta or alpha waveforms, typically occurring during drowsiness.   - Presence of dyshormia during sleep.  - Mild to moderate generalized slowing.    _____________________________________________________________  CLINICAL IMPRESSION:    Abnormal EEG study in the awake, drowsy, and asleep states.   1. One habitual event captured at 18:08 characterized clinically by sudden unresponsiveness, falling into bed, and lack of motor activity. There was no epileptiform discharges or sz correlating with this event. However, this event did correlate with ventricular asystole seen on single channel EKG.  2. There were bursts of high amplitude sharply contoured waveforms of unclear significance, largely during drowsiness.    3. There were sleep structures that suggest underlying seizure disorder.   4. Mild to moderate nonspecific diffuse or multifocal cerebral dysfunction.   5. No seizure seen.    AV block and ventricular asystole seen on single channel EKG.    Findings discussed with primary team.      Miya Shoemaker MD  Attending Physician, [facility] Epilepsy [place]

## 2018-02-20 NOTE — DISCHARGE NOTE ADULT - MEDICATION SUMMARY - MEDICATIONS TO TAKE
I will START or STAY ON the medications listed below when I get home from the hospital:    lamoTRIgine 200 mg oral tablet  -- 1 tab(s) by mouth 2 times a day; given with lamotrigine 50mg for 250mg twice a day  -- Indication: For Convulsions    lamoTRIgine 25 mg oral tablet  -- 2 tab(s) (50mg) by mouth 2 times a day; given with lamotrigine 200mg for 250mg twice a day  -- Indication: For Convulsions    divalproex sodium 500 mg oral delayed release tablet  -- 1 tab(s) by mouth every 8 hours  -- Indication: For Convulsions    metroNIDAZOLE 0.75% topical cream  -- Apply on skin to affected area on face 2 times a day  -- Indication: For rash      MiraLax oral powder for reconstitution  -- 17 gram(s) by mouth once a day, dissolved in liquid  -- Indication: For Constipation    Doc-Q-Lace sodium 100 mg oral capsule  -- 1 cap(s) by mouth 2 times a day  -- Indication: For Constipation    Multiple Vitamins oral tablet  -- 1 tab(s) by mouth once a day  -- Indication: For Nutrition    Calcium 500+D oral tablet, chewable  -- 1 tab(s) by mouth 2 times a day  -- Indication: For Nutrition I will START or STAY ON the medications listed below when I get home from the hospital:    lisinopril 10 mg oral tablet  -- 1 tab(s) by mouth once a day  -- Indication: For HTN    divalproex sodium 500 mg oral delayed release tablet  -- 1 tab(s) by mouth every 8 hours  -- Indication: For Convulsions    lamoTRIgine 200 mg oral tablet  -- 1 tab(s) by mouth 2 times a day; given with lamotrigine 50mg for 250mg twice a day  -- Indication: For Convulsions    lamoTRIgine 25 mg oral tablet  -- 2 tab(s) (50mg) by mouth 2 times a day; given with lamotrigine 200mg for 250mg twice a day  -- Indication: For Convulsions    metroNIDAZOLE 0.75% topical cream  -- Apply on skin to affected area on face 2 times a day  -- Indication: For rash      MiraLax oral powder for reconstitution  -- 17 gram(s) by mouth once a day, dissolved in liquid  -- Indication: For Constipation    Doc-Q-Lace sodium 100 mg oral capsule  -- 1 cap(s) by mouth 2 times a day  -- Indication: For Constipation    Multiple Vitamins oral tablet  -- 1 tab(s) by mouth once a day  -- Indication: For Nutrition    Calcium 500+D oral tablet, chewable  -- 1 tab(s) by mouth 2 times a day  -- Indication: For Nutrition I will START or STAY ON the medications listed below when I get home from the hospital:    wheelchair  -- 1 wheel chair for long distance travel   -- Indication: For Ambulation    lisinopril 10 mg oral tablet  -- 1 tab(s) by mouth once a day  -- Indication: For high blood pressure    lamoTRIgine 200 mg oral tablet  -- 1  by mouth 2 times a day   -- Indication: For Seizures    lamoTRIgine 25 mg oral tablet  -- 2 tab(s) (50mg) by mouth 2 times a day; given with lamotrigine 200mg for 250mg twice a day  -- Indication: For Seizures    divalproex sodium 500 mg oral delayed release tablet  -- 1 tab(s) by mouth every 8 hours  -- Indication: For Seizures    metroNIDAZOLE 0.75% topical cream  -- Apply on skin to affected area on face 2 times a day  -- Indication: For rash      MiraLax oral powder for reconstitution  -- 17 gram(s) by mouth once a day, dissolved in liquid  -- Indication: For Constipation    Doc-Q-Lace sodium 100 mg oral capsule  -- 1 cap(s) by mouth 2 times a day  -- Indication: For Constipation    Multiple Vitamins oral tablet  -- 1 tab(s) by mouth once a day  -- Indication: For Nutrition    Calcium 500+D oral tablet, chewable  -- 1 tab(s) by mouth 2 times a day  -- Indication: For Nutrition

## 2018-02-20 NOTE — DISCHARGE NOTE ADULT - PATIENT PORTAL LINK FT
You can access the [facility] Portal, offered by [facility], by registering with the following website: [email or web address]

## 2018-02-20 NOTE — PROGRESS NOTE ADULT - ATTENDING COMMENTS
57y-old Male with intellectual disability, pachygyria, epilepsy (outpt neurologist Dr. Cal Maldonado) who presented from group home with what thought was increased sz activity. Hx obtained from chart review and pt's brother, eyad ([phone]). Onset of sz around 2-4yo, described as eye rolling backward and full body tremulousness that would last <1min. This rarely occurs, maybe several times a year. Brother calls this "small sz." Brother said there was no h/o convulsion. Home AEDs prior to admission: LCM 150mg BID, LTG 250mg BID, /1000.     About 10-20 yrs ago, another type of event emerged, and his brother calls this "big sz." It is described as sudden collapse, unresponsiveness, staring ahead, and lack of motor activity.     Monday 2/19 evening around 6:10pm, pt had a habitual "big sz" when he suddenly collapsed into bed, motionless, stared off, and was unresponsive to tactile stimulus. This event correlated with NSR 80-90 BPM transitioning to AV block, transitioning to ventricular asystole (13s asystole -> 1 QRS complex ->14s asytole -> 1 QRS complex -> 8s asystole), transitioning back to AV block. There was no epileptiform discharge or sz seen during this event. Pt reported had 2 additional convulsions right before TVP and during TVP placement, probably convulsive syncope? For these events, pt was loaded with LEV.    Personally review all studies and labs, including CTH and EEG.  CTH 1/18/18: c/w pachygyria   cvEEG 1/19/18: one habitual event of unresponsiveness and sudden collapse correlated with ventricular asystole (refer to separate report for detail)    # Epilepsy: characterized by eye rolling upward and tremulousness  - cont LTG 250mg BID, VPA 500mg q8h  - check LTG level   - taper off LEV as detailed in neuro resident's note; LEV was started for events that we now know are non-epileptic but cardiac in origin  - taper off LCM as detailed in neuro resident's note; LCM may cause AR prolongation and AV block  - may need to adjust VPA and LTG dosing once LTG level is back; be cautious of drug-drug interaction (increasing VPA will increase LTG level, which puts pt at risk for rash/[place])  - cvEEG  - update and coordinate care with outpt neurologist Dr. Maldonado before discharge    # Ventricular asystole: clinically correlated with sudden collapse, staring off, unresponsiveness, +/- convulsion (convulsive syncope, NOT GTC sz).  - s/p TVP  - management per cardiology    # Complete AV block  - s/p TVP  - will taper off LCM    # Intellectual disability

## 2018-02-20 NOTE — DISCHARGE NOTE ADULT - HOSPITAL COURSE
57M with intellectual disability and long h/o seizures presents from Group Home with increased seizure activity and nausea and vomiting. In ED, CT Head without acute findings. Patient was being managed on the floors with additional AEDs per Neurology and experienced witnessed seizure while on EEG monitoring with subsequent RRT. EKG performed showed complete heart block with narrow QRS and VR 30s. Patient transferred to CCU for closer monitoring and TVP insertion. Patient experienced additional GTC seizure during TVP insertion that self-resolved. Post-procedure, patient was noted to have converted to NSR. EP consulted and agreed with need for PPM. Echo demonstrated...  Subsequently, patient underwent ...pacemaker insertion... without complication. Patient was weaned off of Keppra and Vimpat, and continued on Depakote and Lamictal... 57M with intellectual disability and long h/o seizures presents from Group Home with increased seizure activity and nausea and vomiting. In ED, CT Head without acute findings. Patient was being managed on the floors with additional AEDs per Neurology and experienced witnessed seizure while on EEG monitoring with subsequent RRT. EKG performed showed complete heart block with narrow QRS and VR 30s. Patient transferred to CCU for closer monitoring and TVP insertion. Patient experienced additional GTC seizure during TVP insertion that self-resolved. Post-procedure, patient was noted to have converted to NSR. EP consulted and agreed with need for PPM. Echo demonstrated normal LV and RV systolic function with mild AR and pulmonary HTN and minimal MR. Subsequently, patient underwent permanent pacemaker insertion on 2/21/18 without complication. Patient was weaned off of Keppra and Vimpat, and continued on Depakote and Lamictal. 57M with intellectual disability and long h/o seizures presents from Group Home with increased seizure activity and nausea and vomiting. In ED, CT Head without acute findings. Patient was being managed on the floors with additional AEDs per Neurology and experienced witnessed seizure activity. EKG performed showed complete heart block with narrow QRS and VR 30s. Patient transferred to CCU for closer monitoring and TVP insertion. Patient experienced additional GTC seizure during TVP insertion that self-resolved. Post-procedure, patient was noted to have converted to NSR. EP consulted and agreed with need for PPM. Echo demonstrated normal LV and RV systolic function with mild AR and pulmonary HTN and minimal MR. Subsequently, patient underwent permanent pacemaker insertion on 2/21/18 without complication. Of note, never had any seizure activity on EEG during this hospitalization and that all his events were actually syncopal events or convulsive syncope related to the sinus arrest and heart block. Asa result, patient was weaned off of Keppra and Vimpat, and continued on Depakote and Lamictal. 57M with intellectual disability and long h/o seizures presents from Group Home with increased seizure activity and nausea and vomiting. In ED, CT Head without acute findings. Patient was being managed on the floors with additional AEDs per Neurology and experienced witnessed seizure activity. EKG performed showed complete heart block with narrow QRS and VR 30s. Patient transferred to CCU for closer monitoring and TVP insertion. Patient experienced additional GTC seizure during TVP insertion that self-resolved. Post-procedure, patient was noted to have converted to NSR. EP consulted and agreed with need for PPM. Echo demonstrated normal LV and RV systolic function with mild AR and pulmonary HTN and minimal MR. Subsequently, patient underwent permanent pacemaker insertion on 2/21/18 without complication. Of note, never had any seizure activity on EEG during this hospitalization and that all his events were actually syncopal events or convulsive syncope related to the sinus arrest and heart block. Asa result, patient was weaned off of Keppra and Vimpat, and continued on Depakote and Lamictal.  Patient was deemed stable for discharge. dx: complete heart block  57M with intellectual disability and long h/o seizures presents from Group Home with increased seizure activity and nausea and vomiting. In ED, CT Head without acute findings. Patient was being managed on the floors with additional AEDs per Neurology and experienced witnessed seizure activity. EKG performed showed complete heart block with narrow QRS and VR 30s. Patient transferred to CCU for closer monitoring and TVP insertion. Patient experienced additional GTC seizure during TVP insertion that self-resolved. Post-procedure, patient was noted to have converted to NSR. EP consulted and agreed with need for PPM. Echo demonstrated normal LV and RV systolic function with mild AR and pulmonary HTN and minimal MR. Subsequently, patient underwent permanent pacemaker insertion on 2/21/18 without complication. Of note, pt never had any seizure activity on EEG during this hospitalization,  all his events were deemed to be related to syncopal events or convulsive syncope related to the sinus arrest and heart block. Asa result, patient was weaned off of Keppra and Vimpat, and continued on Depakote and Lamictal.  Patient also experienced episodes of elevated BP after pace maker placement. Patient was started on Lisinopril 10mg, this medication should be titrated by patient's cardiologist. Patient was deemed stable for discharge. dx: complete heart block, seizure disorder  57M with intellectual disability and long h/o seizures presents from Group Home with increased seizure activity and nausea and vomiting. In ED, CT Head without acute findings. Patient was being managed on the floors with additional AEDs per Neurology and experienced witnessed seizure activity. EKG performed showed complete heart block with narrow QRS and VR 30s. Patient transferred to CCU for closer monitoring and TVP insertion. Patient experienced additional GTC seizure during TVP insertion that self-resolved. Post-procedure, patient was noted to have converted to NSR. EP consulted and agreed with need for PPM. Echo demonstrated normal LV and RV systolic function with mild AR and pulmonary HTN and minimal MR. Subsequently, patient underwent permanent pacemaker insertion on 2/21/18 without complication. Of note, pt never had any seizure activity on EEG during this hospitalization,  all his events were deemed to be related to syncopal events or convulsive syncope related to the sinus arrest and heart block. As a result, patient was weaned off of Keppra and Vimpat, and continued on Depakote and Lamictal.  Patient also experienced episodes of elevated BP after pace maker placement. Patient was started on Lisinopril 10mg, this medication should be titrated by patient's cardiologist or primary care doctor. Patient was deemed stable for discharge to his group home after ambulating with PT and being assessed for walking up the stairs.

## 2018-02-20 NOTE — DISCHARGE NOTE ADULT - ADDITIONAL INSTRUCTIONS
To follow up with your neurologist and cardiologist within a week of discharge  To follow up with your primary care doctor within two weeks of discharge

## 2018-02-20 NOTE — PROGRESS NOTE ADULT - SUBJECTIVE AND OBJECTIVE BOX
Patient is a 57y old  Male who presents with a chief complaint of Seizures (2018 18:06)      OVERNIGHT EVENTS: No acute events overnight.  SUBJECTIVE: Patient seen and examined at bedside. Denies current complaints of CP, SOB, abdominal pain/N/V.        MEDICATIONS  (STANDING):  calcium carbonate 1250 mG + Vitamin D (OsCal 500 + D) 1 Tablet(s) Oral daily  diVALproex  milliGRAM(s) Oral every 8 hours  docusate sodium 100 milliGRAM(s) Oral two times a day  heparin  Injectable 5000 Unit(s) SubCutaneous every 12 hours  hydrALAZINE Injectable 10 milliGRAM(s) IV Push once  lacosamide 150 milliGRAM(s) Oral every 12 hours  lamoTRIgine 250 milliGRAM(s) Oral every 12 hours  levETIRAcetam 1000 milliGRAM(s) Oral two times a day  magnesium sulfate  IVPB 2 Gram(s) IV Intermittent once  metroNIDAZOLE 0.75% Cream 1 Application(s) Topical two times a day  multivitamin 1 Tablet(s) Oral daily  polyethylene glycol 3350 17 Gram(s) Oral daily  sodium chloride 0.9%. 1000 milliLiter(s) (10 mL/Hr) IV Continuous <Continuous>    MEDICATIONS  (PRN):      T(C): 36.7 (18 @ 07:43), Max: 37.1 (18 @ 00:00)  HR: 62 (18 @ 08:00) (24 - 98)  BP: 189/83 (18 @ 08:00) (113/73 - 189/83)  RR: 23 (18 @ 08:00) (9 - 24)  SpO2: 99% (18 @ 08:00) (95% - 99%)  CAPILLARY BLOOD GLUCOSE      POCT Blood Glucose.: 180 mg/dL (2018 18:25)    I&O's Summary    2018 07:01  -  2018 07:00  --------------------------------------------------------  IN: 1295 mL / OUT: 2550 mL / NET: -1255 mL        PHYSICAL EXAM  GENERAL: NAD, well-developed  HEAD:  Atraumatic, Normocephalic  EYES: EOMI, PERRLA, conjunctiva and sclera clear  CHEST/LUNG: Clear to auscultation bilaterally  HEART: +S1 +S2, Regular rate and rhythm  ABDOMEN: Soft, Nontender, Nondistended  NEURO: No focal neurologic deficits, sensation and motor function grossly intact.   EXTREMITIES:  Pink and warm, Peripheral Pulses intact      LABS:                        12.9   8.88  )-----------( 222      ( 2018 05:50 )             38.5     02-20    137  |  98  |  17  ----------------------------<  86  4.1   |  28  |  0.84    Ca    9.0      2018 05:50  Phos  2.7     02-20  Mg     1.7     02-20    TPro  7.2  /  Alb  3.8  /  TBili  0.2  /  DBili  x   /  AST  26  /  ALT  25  /  AlkPhos  50  02-19    PT/INR - ( 2018 18:35 )   PT: 14.6 SEC;   INR: 1.26            CARDIAC MARKERS ( 2018 18:35 )  x     / < 0.06 ng/mL / 80 u/L / 2.68 ng/mL / x      CARDIAC MARKERS ( 2018 11:12 )  x     / < 0.06 ng/mL / 88 u/L / 3.88 ng/mL / x          Urinalysis Basic - ( 2018 15:06 )    Color: PALE YELLOW / Appearance: CLEAR / S.014 / pH: 7.5  Gluc: NEGATIVE / Ketone: NEGATIVE  / Bili: NEGATIVE / Urobili: NORMAL mg/dL   Blood: NEGATIVE / Protein: 20 mg/dL / Nitrite: NEGATIVE   Leuk Esterase: NEGATIVE / RBC: 0-2 / WBC 0-2   Sq Epi: x / Non Sq Epi: x / Bacteria: x        RADIOLOGY & ADDITIONAL TESTS:  Imaging Personally Reviewed:  Consultant(s) Notes Reviewed:    Care Discussed with Consultants/Other Providers:

## 2018-02-21 LAB
APPEARANCE UR: CLEAR — SIGNIFICANT CHANGE UP
BACTERIA # UR AUTO: SIGNIFICANT CHANGE UP
BILIRUB UR-MCNC: NEGATIVE — SIGNIFICANT CHANGE UP
BLOOD UR QL VISUAL: NEGATIVE — SIGNIFICANT CHANGE UP
BUN SERPL-MCNC: 24 MG/DL — HIGH (ref 7–23)
CALCIUM SERPL-MCNC: 9 MG/DL — SIGNIFICANT CHANGE UP (ref 8.4–10.5)
CHLORIDE SERPL-SCNC: 97 MMOL/L — LOW (ref 98–107)
CO2 SERPL-SCNC: 28 MMOL/L — SIGNIFICANT CHANGE UP (ref 22–31)
COLOR SPEC: YELLOW — SIGNIFICANT CHANGE UP
CREAT SERPL-MCNC: 1.09 MG/DL — SIGNIFICANT CHANGE UP (ref 0.5–1.3)
GLUCOSE SERPL-MCNC: 92 MG/DL — SIGNIFICANT CHANGE UP (ref 70–99)
GLUCOSE UR-MCNC: NEGATIVE — SIGNIFICANT CHANGE UP
HCT VFR BLD CALC: 38.3 % — LOW (ref 39–50)
HGB BLD-MCNC: 12.7 G/DL — LOW (ref 13–17)
KETONES UR-MCNC: SIGNIFICANT CHANGE UP
LEUKOCYTE ESTERASE UR-ACNC: NEGATIVE — SIGNIFICANT CHANGE UP
MAGNESIUM SERPL-MCNC: 2.1 MG/DL — SIGNIFICANT CHANGE UP (ref 1.6–2.6)
MCHC RBC-ENTMCNC: 31.9 PG — SIGNIFICANT CHANGE UP (ref 27–34)
MCHC RBC-ENTMCNC: 33.2 % — SIGNIFICANT CHANGE UP (ref 32–36)
MCV RBC AUTO: 96.2 FL — SIGNIFICANT CHANGE UP (ref 80–100)
NITRITE UR-MCNC: NEGATIVE — SIGNIFICANT CHANGE UP
NRBC # FLD: 0 — SIGNIFICANT CHANGE UP
PH UR: 7.5 — SIGNIFICANT CHANGE UP (ref 4.6–8)
PHOSPHATE SERPL-MCNC: 3.9 MG/DL — SIGNIFICANT CHANGE UP (ref 2.5–4.5)
PLATELET # BLD AUTO: 217 K/UL — SIGNIFICANT CHANGE UP (ref 150–400)
PMV BLD: 10.8 FL — SIGNIFICANT CHANGE UP (ref 7–13)
POTASSIUM SERPL-MCNC: 4.2 MMOL/L — SIGNIFICANT CHANGE UP (ref 3.5–5.3)
POTASSIUM SERPL-SCNC: 4.2 MMOL/L — SIGNIFICANT CHANGE UP (ref 3.5–5.3)
PROT UR-MCNC: 20 MG/DL — SIGNIFICANT CHANGE UP
RBC # BLD: 3.98 M/UL — LOW (ref 4.2–5.8)
RBC # FLD: 12.5 % — SIGNIFICANT CHANGE UP (ref 10.3–14.5)
RBC CASTS # UR COMP ASSIST: SIGNIFICANT CHANGE UP (ref 0–?)
SODIUM SERPL-SCNC: 135 MMOL/L — SIGNIFICANT CHANGE UP (ref 135–145)
SP GR SPEC: 1.02 — SIGNIFICANT CHANGE UP (ref 1–1.04)
SQUAMOUS # UR AUTO: SIGNIFICANT CHANGE UP
UROBILINOGEN FLD QL: NORMAL MG/DL — SIGNIFICANT CHANGE UP
WBC # BLD: 11.11 K/UL — HIGH (ref 3.8–10.5)
WBC # FLD AUTO: 11.11 K/UL — HIGH (ref 3.8–10.5)
WBC UR QL: SIGNIFICANT CHANGE UP (ref 0–?)

## 2018-02-21 PROCEDURE — 99233 SBSQ HOSP IP/OBS HIGH 50: CPT | Mod: GC

## 2018-02-21 PROCEDURE — 93010 ELECTROCARDIOGRAM REPORT: CPT

## 2018-02-21 PROCEDURE — 71045 X-RAY EXAM CHEST 1 VIEW: CPT | Mod: 26

## 2018-02-21 PROCEDURE — 33208 INSRT HEART PM ATRIAL & VENT: CPT | Mod: KX

## 2018-02-21 PROCEDURE — 95951: CPT | Mod: 26

## 2018-02-21 PROCEDURE — 71045 X-RAY EXAM CHEST 1 VIEW: CPT | Mod: 26,77

## 2018-02-21 PROCEDURE — 99233 SBSQ HOSP IP/OBS HIGH 50: CPT

## 2018-02-21 RX ORDER — VANCOMYCIN HCL 1 G
1000 VIAL (EA) INTRAVENOUS ONCE
Qty: 0 | Refills: 0 | Status: COMPLETED | OUTPATIENT
Start: 2018-02-22 | End: 2018-02-22

## 2018-02-21 RX ADMIN — LACOSAMIDE 75 MILLIGRAM(S): 50 TABLET ORAL at 05:36

## 2018-02-21 RX ADMIN — LEVETIRACETAM 500 MILLIGRAM(S): 250 TABLET, FILM COATED ORAL at 05:36

## 2018-02-21 RX ADMIN — Medication 100 MILLIGRAM(S): at 17:42

## 2018-02-21 RX ADMIN — LAMOTRIGINE 250 MILLIGRAM(S): 25 TABLET, ORALLY DISINTEGRATING ORAL at 17:43

## 2018-02-21 RX ADMIN — DIVALPROEX SODIUM 500 MILLIGRAM(S): 500 TABLET, DELAYED RELEASE ORAL at 15:54

## 2018-02-21 RX ADMIN — LEVETIRACETAM 250 MILLIGRAM(S): 250 TABLET, FILM COATED ORAL at 17:44

## 2018-02-21 RX ADMIN — LACOSAMIDE 50 MILLIGRAM(S): 50 TABLET ORAL at 17:43

## 2018-02-21 RX ADMIN — DIVALPROEX SODIUM 500 MILLIGRAM(S): 500 TABLET, DELAYED RELEASE ORAL at 05:35

## 2018-02-21 RX ADMIN — METRONIDAZOLE 1 APPLICATION(S): 7.5 GEL VAGINAL at 17:44

## 2018-02-21 RX ADMIN — METRONIDAZOLE 1 APPLICATION(S): 7.5 GEL VAGINAL at 05:38

## 2018-02-21 RX ADMIN — Medication 1 TABLET(S): at 15:54

## 2018-02-21 RX ADMIN — LAMOTRIGINE 250 MILLIGRAM(S): 25 TABLET, ORALLY DISINTEGRATING ORAL at 05:37

## 2018-02-21 RX ADMIN — DIVALPROEX SODIUM 500 MILLIGRAM(S): 500 TABLET, DELAYED RELEASE ORAL at 21:39

## 2018-02-21 RX ADMIN — POLYETHYLENE GLYCOL 3350 17 GRAM(S): 17 POWDER, FOR SOLUTION ORAL at 15:55

## 2018-02-21 RX ADMIN — Medication 100 MILLIGRAM(S): at 05:35

## 2018-02-21 NOTE — EEG REPORT - NS EEG TEXT BOX
[facility]   COMPREHENSIVE EPILEPSY CENTER   REPORT OF CONTINUOUS VIDEO EEG     [facility]: [address], [place] [address], Ph#: [phone]  [facility]: [address], [place] [address], Ph#: [phone]  Office: [address] [place] [address], [place] [address], [place] [address] Ph#: [phone]    Patient Name: MAKAYLA CAMARENA  Age and : 57y ([birth year])  MRN #: [id number]  Location: [facility]     Study Date: 18- 18    _____________________________________________________________  TECHNICAL INFORMATION    EEG Recording Technique:  The patient underwent continuous Video-EEG monitoring, using Telemetry System hardware on the XLTek Digital System. EEG and video data were stored on a computer hard drive with important events saved in digital archive files. The material was reviewed by a physician (electroencephalographer / epileptologist) on a daily basis. Joselo and seizure detection algorithms were utilized and reviewed. An EEG Technician attended to the patient, and was available throughout daytime work hours.  The epilepsy center neurologist was available in person or on call 24-hours per day.    EEG Placement and Labeling of Electrodes:  The EEG was performed utilizing 20 channel referential EEG connections (coronal over temporal over parasagittal montage) using all standard 10-20 electrode placements with EKG, with additional electrodes placed in the inferior temporal region using the modified 10-10 montage electrode placements for elective admissions, or if deemed necessary. Recording was at a sampling rate of 256 samples per second per channel. Time synchronized digital video recording was done simultaneously with EEG recording. A low light infrared camera was used for low light recording.     _____________________________________________________________  HISTORY:  Patient is a 57y old  Male who presents with a chief complaint of Seizures (2018 18:06), found to have paroxysmal 3rd AV block.    PERTINENT MEDICATION:  diVALproex  milliGRAM(s) Oral every 8 hours  lacosamide 50 milliGRAM(s) Oral once  lamoTRIgine 250 milliGRAM(s) Oral every 12 hours  levETIRAcetam 250 milliGRAM(s) Oral once    _____________________________________________________________  STUDY INTERPRETATION    Background:  The background was continuous, spontaneously variable, reactive, and predominantly consisted of theta, polymorphic delta, and alpha activities, with more delta activity seen in the BL parasagittal regions. During wakefulness, the posterior dominant rhythm consisted of symmetric 7 Hz activity, with an amplitude up to 50 uV, that attenuated to eye opening.      Sleep:  - Drowsiness was characterized by slowing of the background activity and decreased EMG artifacts.    - Sleep transients were characterized by the presence of symmetric K-complexes.   - Dyshormia was also present and characterized by 0.25s of polyspikes followed by K-complexes.    Non-epileptiform Paroxysmal Abnormalities:  Occasional 2-4s bursts of 100-170uV sharply contoured theta or alpha waveforms, typically occurring during drowsiness and sleep.     Interictal Epileptiform Abnormalities:   None    Ictal Abnormalities:   None    Artifacts:  Intermittent myogenic and movement artifacts were noted.     ECG:  The heart rate on single channel ECG was predominantly between 80-90 BPM, actively paced by TVP.    _____________________________________________________________  EEG CLASSIFICATION/SUMMARY:    Abnormal EEG in the awake, drowsy, and asleep states due to  - Occasional 2-4s bursts of 100-170uV sharply contoured theta or alpha waveforms, typically occurring during drowsiness.   - Presence of dyshormia during sleep.  - Mild to moderate generalized slowing, most prominent in the BL parasagittal regions.    _____________________________________________________________  CLINICAL IMPRESSION:    Abnormal EEG study in the awake, drowsy, and asleep states.   1. There were bursts of high amplitude sharply contoured waveforms of unclear significance, largely during drowsiness and sleep.    2. There were sleep structures that suggested underlying seizure disorder.   3. Mild to moderate nonspecific diffuse or multifocal cerebral dysfunction, worse in BL parasagittal regions.   4. No seizure seen.      Miya Shoemaker MD  Attending Physician, [facility] [place]

## 2018-02-21 NOTE — PROGRESS NOTE ADULT - ATTENDING COMMENTS
57y-old Male with intellectual disability, pachygyria, epilepsy (outpt neurologist Dr. Cal Maldonado) who presented from group home with what thought was increased sz activity. Hx obtained from chart review and pt's brother, eyad ([phone]). Onset of sz around 2-4yo, described as eye rolling backward and full body tremulousness that would last <1min. This rarely occurs, maybe several times a year. Brother calls this "small sz." Brother said there was no h/o convulsion. Home AEDs prior to admission: LCM 150mg BID, LTG 250mg BID, /1000.     About 10-20 yrs ago, another type of event emerged, and his brother calls this "big sz." It is described as sudden collapse, unresponsiveness, staring ahead, and lack of motor activity. Monday 2/19 evening around 6:10pm, pt had a habitual "big sz" when he suddenly collapsed into bed, motionless, stared off, and was unresponsive to tactile stimulus. This event correlated with NSR 80-90 BPM transitioning to AV block, transitioning to ventricular asystole (13s asystole -> 1 QRS complex ->14s asytole -> 1 QRS complex -> 8s asystole), transitioning back to AV block. There was no epileptiform discharge or sz seen during this event. Pt reported had 2 additional convulsions right before TVP and during TVP placement, probably convulsive syncope. For these events, pt was loaded with LEV.    Personally review all studies and labs, including CTH and EEG.  CTH 1/18/18: c/w pachygyria   cvEEG 1/20/18: no event/sz recorded (refer to separate report for detail)    This morning around 10am, when pacer wire was dislodged, pt again went into cardiac asystole that correlated with clinical unresponsiveness and BUE jerking (convulsive syncope). EEG did not show any seizure activity. Pt had PPM placed today. Long discussion with brother today that the previously described "big seizures" are actually complications of cardiac asystole. Asked brother to inform grp home of this new finding.    # Epilepsy: characterized by eye rolling upward and tremulousness  - cont LTG 250mg BID, VPA 500mg q8h  - check LTG level   - will receive last doses of LEV and LCM tonight   - may need to adjust VPA and LTG dosing once LTG level is back; be cautious of drug-drug interaction (increasing VPA will increase LTG level, which puts pt at risk for rash/[place])  - cvEEG  - update and coordinate care with outpt neurologist Dr. Maldonado before discharge    # Ventricular asystole: clinically correlated with sudden collapse, staring off, unresponsiveness, +/- convulsion (convulsive syncope, NOT GTC sz).  - PPM today  - management per cardiology    # Complete AV block  - PPM today  - will taper off LCM    # Intellectual disability

## 2018-02-21 NOTE — PROGRESS NOTE ADULT - SUBJECTIVE AND OBJECTIVE BOX
Patient is a 57y old  Male who presents with a chief complaint of Seizures (20 Feb 2018 13:13)      OVERNIGHT EVENTS: No acute events overnight.  SUBJECTIVE: Patient seen and examined at bedside. Denies current complaints of CP, SOB, abdominal pain/N/V.       MEDICATIONS  (STANDING):  calcium carbonate 1250 mG + Vitamin D (OsCal 500 + D) 1 Tablet(s) Oral daily  diVALproex  milliGRAM(s) Oral every 8 hours  docusate sodium 100 milliGRAM(s) Oral two times a day  heparin  Injectable 5000 Unit(s) SubCutaneous every 8 hours  lacosamide 50 milliGRAM(s) Oral once  lamoTRIgine 250 milliGRAM(s) Oral every 12 hours  levETIRAcetam 250 milliGRAM(s) Oral once  metroNIDAZOLE 0.75% Cream 1 Application(s) Topical two times a day  multivitamin 1 Tablet(s) Oral daily  polyethylene glycol 3350 17 Gram(s) Oral daily  sodium chloride 0.9%. 1000 milliLiter(s) (10 mL/Hr) IV Continuous <Continuous>    MEDICATIONS  (PRN):      T(C): 36.9 (02-21-18 @ 04:00), Max: 37.2 (02-20-18 @ 19:45)  HR: 73 (02-21-18 @ 07:00) (60 - 89)  BP: 149/85 (02-21-18 @ 07:00) (97/55 - 183/98)  RR: 15 (02-21-18 @ 07:00) (14 - 21)  SpO2: 97% (02-21-18 @ 07:00) (96% - 100%)  CAPILLARY BLOOD GLUCOSE        I&O's Summary    20 Feb 2018 07:01  -  21 Feb 2018 07:00  --------------------------------------------------------  IN: 1110 mL / OUT: 1325 mL / NET: -215 mL        PHYSICAL EXAM  GENERAL: NAD, well-developed  HEAD:  Atraumatic, Normocephalic. +EEG wires in place covered in gauze dressing, C/D/I.   EYES: EOMI, PERRLA, conjunctiva and sclera clear  CHEST/LUNG: Clear to auscultation bilaterally  HEART: +S1 +S2, Regular rate and rhythm. +R IJ TVP in place, dressing C/D/I.  ABDOMEN: Soft, Nontender, Nondistended  NEURO: No focal neurologic deficits, sensation and motor function grossly intact.   EXTREMITIES:  Pink and warm, Peripheral Pulses intact      LABS:                        12.7   11.11 )-----------( 217      ( 21 Feb 2018 03:40 )             38.3     02-21    135  |  97<L>  |  24<H>  ----------------------------<  92  4.2   |  28  |  1.09    Ca    9.0      21 Feb 2018 03:40  Phos  3.9     02-21  Mg     2.1     02-21    TPro  7.2  /  Alb  3.8  /  TBili  0.2  /  DBili  x   /  AST  26  /  ALT  25  /  AlkPhos  50  02-19    PT/INR - ( 19 Feb 2018 18:35 )   PT: 14.6 SEC;   INR: 1.26            CARDIAC MARKERS ( 19 Feb 2018 18:35 )  x     / < 0.06 ng/mL / 80 u/L / 2.68 ng/mL / x              RADIOLOGY & ADDITIONAL TESTS:  < from: Transthoracic Echocardiogram (02.20.18 @ 13:46) >  CONCLUSIONS:  1. Mitral annular calcification, otherwise normal mitral  valve. Minimal mitral regurgitation.  2. Calcified trileaflet aortic valve with normal opening.  Mild aorticregurgitation.  3. Endocardium not well visualized; grossly normal left  ventricular systolic function.  4. The right ventricle is not well visualized; grossly  normal right ventricular systolic function.  5. Estimated right ventricular systolic pressure equals 43  mm Hg, assuming right atrial pressure equals 10 mm Hg,  consistent with mild pulmonary hypertension.  < end of copied text >    Imaging Personally Reviewed:  Consultant(s) Notes Reviewed:    Care Discussed with Consultants/Other Providers:

## 2018-02-21 NOTE — PROGRESS NOTE ADULT - SUBJECTIVE AND OBJECTIVE BOX
Epilepsy Consult Note    Subjective: Pt now on transvenous pacer, though wire was dislodged. Planned for implantable pacer today.    Objective:   Vital Signs Last 24 Hrs  T(C): 36.9 (21 Feb 2018 04:00), Max: 37.2 (20 Feb 2018 19:45)  T(F): 98.5 (21 Feb 2018 04:00), Max: 98.9 (20 Feb 2018 19:45)  HR: 60 (21 Feb 2018 10:00) (60 - 89)  BP: 181/94 (21 Feb 2018 10:00) (97/55 - 181/94)  BP(mean): 102 (21 Feb 2018 10:00) (53 - 110)  RR: 20 (21 Feb 2018 10:00) (14 - 20)  SpO2: 98% (21 Feb 2018 10:00) (95% - 98%)      General appearance: No acute distress                 Mental Status:  alert and oriented x2 (baseline), speech is limited, following commands    Cranial Nerves: PERRL, EOMI without nystagmus, visual fields intact grossly, no facial droop, no dysarthria    Motor:   Tone: normal               Strength: moving all extremities equally    Pronator drift: none b/l  Tremor:  none appreciated at rest or in action    Sensation: intact grossly to light touch throughout    Deep Tendon Reflexes:  2+ throughout  Toes flexor bilaterally      02-21    135  |  97<L>  |  24<H>  ----------------------------<  92  4.2   |  28  |  1.09    Ca    9.0      21 Feb 2018 03:40  Phos  3.9     02-21  Mg     2.1     02-21    TPro  7.2  /  Alb  3.8  /  TBili  0.2  /  DBili  x   /  AST  26  /  ALT  25  /  AlkPhos  50  02-19 02-21    135  |  97<L>  |  24<H>  ----------------------------<  92  4.2   |  28  |  1.09    Ca    9.0      21 Feb 2018 03:40  Phos  3.9     02-21  Mg     2.1     02-21    TPro  7.2  /  Alb  3.8  /  TBili  0.2  /  DBili  x   /  AST  26  /  ALT  25  /  AlkPhos  50  02-19    LIVER FUNCTIONS - ( 19 Feb 2018 18:35 )  Alb: 3.8 g/dL / Pro: 7.2 g/dL / ALK PHOS: 50 u/L / ALT: 25 u/L / AST: 26 u/L / GGT: x                                 12.7   11.11 )-----------( 217      ( 21 Feb 2018 03:40 )             38.3     Radiology:  vEEG 2/21/18:  Abnormal EEG study in the awake, drowsy, and asleep states.   1. There were bursts of high amplitude sharply contoured waveforms of unclear significance, largely during drowsiness and sleep.    2. There were sleep structures that suggested underlying seizure disorder.   3. Mild to moderate nonspecific diffuse or multifocal cerebral dysfunction, worse in BL parasagittal regions.   4. No seizure seen.    MEDICATIONS  (STANDING):  calcium carbonate 1250 mG + Vitamin D (OsCal 500 + D) 1 Tablet(s) Oral daily  diVALproex  milliGRAM(s) Oral every 8 hours  docusate sodium 100 milliGRAM(s) Oral two times a day  heparin  Injectable 5000 Unit(s) SubCutaneous every 8 hours  lacosamide 50 milliGRAM(s) Oral once  lamoTRIgine 250 milliGRAM(s) Oral every 12 hours  levETIRAcetam 250 milliGRAM(s) Oral once  metroNIDAZOLE 0.75% Cream 1 Application(s) Topical two times a day  multivitamin 1 Tablet(s) Oral daily  polyethylene glycol 3350 17 Gram(s) Oral daily  sodium chloride 0.9%. 1000 milliLiter(s) (10 mL/Hr) IV Continuous <Continuous>    MEDICATIONS  (PRN): Epilepsy Consult Note    Subjective: Pt now on transvenous pacer, though wire was dislodged. Planned for implantable pacer today.    Objective:   Vital Signs Last 24 Hrs  T(C): 36.9 (21 Feb 2018 04:00), Max: 37.2 (20 Feb 2018 19:45)  T(F): 98.5 (21 Feb 2018 04:00), Max: 98.9 (20 Feb 2018 19:45)  HR: 60 (21 Feb 2018 10:00) (60 - 89)  BP: 181/94 (21 Feb 2018 10:00) (97/55 - 181/94)  BP(mean): 102 (21 Feb 2018 10:00) (53 - 110)  RR: 20 (21 Feb 2018 10:00) (14 - 20)  SpO2: 98% (21 Feb 2018 10:00) (95% - 98%)    ROS:  Unable to obtain as patient with intellectual disability.    GENERAL PHYSICAL EXAM:  GEN: well appearing, well nourished, no distress, limited interaction with environment  HEENT:  NCAT, OP clear  EYES: sclera white, conjunctiva clear, no nystagmus  NECK: supple  CV: RRR, no murmur     		  PULM: CTAB, no wheezing  GI: soft ABD, +BS, NT  EXT: peripheral pulse intact, no edema, no clubbing, no cyanosis  SKIN: warm, dry, no rash or lesion on exposed skin     NEUROLOGICAL EXAM:          Mental Status:  alert and oriented x2 (baseline), speech is limited, following commands    Cranial Nerves: PERRL, EOMI without nystagmus, visual fields intact grossly, no facial droop, no dysarthria    Motor:   Tone: normal               Strength: moving all extremities equally    Pronator drift: none b/l  Tremor:  none appreciated at rest or in action    Sensation: intact grossly to light touch throughout    Deep Tendon Reflexes:  2+ throughout  Toes flexor bilaterally      02-21    135  |  97<L>  |  24<H>  ----------------------------<  92  4.2   |  28  |  1.09    Ca    9.0      21 Feb 2018 03:40  Phos  3.9     02-21  Mg     2.1     02-21    TPro  7.2  /  Alb  3.8  /  TBili  0.2  /  DBili  x   /  AST  26  /  ALT  25  /  AlkPhos  50  02-19 02-21    135  |  97<L>  |  24<H>  ----------------------------<  92  4.2   |  28  |  1.09    Ca    9.0      21 Feb 2018 03:40  Phos  3.9     02-21  Mg     2.1     02-21    TPro  7.2  /  Alb  3.8  /  TBili  0.2  /  DBili  x   /  AST  26  /  ALT  25  /  AlkPhos  50  02-19    LIVER FUNCTIONS - ( 19 Feb 2018 18:35 )  Alb: 3.8 g/dL / Pro: 7.2 g/dL / ALK PHOS: 50 u/L / ALT: 25 u/L / AST: 26 u/L / GGT: x                                 12.7   11.11 )-----------( 217      ( 21 Feb 2018 03:40 )             38.3     vEEG 2/21/18:  Abnormal EEG study in the awake, drowsy, and asleep states.   1. There were bursts of high amplitude sharply contoured waveforms of unclear significance, largely during drowsiness and sleep.    2. There were sleep structures that suggested underlying seizure disorder.   3. Mild to moderate nonspecific diffuse or multifocal cerebral dysfunction, worse in BL parasagittal regions.   4. No seizure seen.    MEDICATIONS  (STANDING):  calcium carbonate 1250 mG + Vitamin D (OsCal 500 + D) 1 Tablet(s) Oral daily  diVALproex  milliGRAM(s) Oral every 8 hours  docusate sodium 100 milliGRAM(s) Oral two times a day  heparin  Injectable 5000 Unit(s) SubCutaneous every 8 hours  lacosamide 50 milliGRAM(s) Oral once  lamoTRIgine 250 milliGRAM(s) Oral every 12 hours  levETIRAcetam 250 milliGRAM(s) Oral once  metroNIDAZOLE 0.75% Cream 1 Application(s) Topical two times a day  multivitamin 1 Tablet(s) Oral daily  polyethylene glycol 3350 17 Gram(s) Oral daily  sodium chloride 0.9%. 1000 milliLiter(s) (10 mL/Hr) IV Continuous <Continuous>    MEDICATIONS  (PRN):

## 2018-02-21 NOTE — PROGRESS NOTE ADULT - ASSESSMENT
Pt is a 57 year old Male w/ a PMH of intellectual disability, and seizure disorder BIBEMS from group home presenting w/ seizure like activity. Neurological exam non-focal. CTH w/o acute pathology. Episode of tone loss (at first thought to be seizure) was on EEG associated with 3 prolonged episode of Sinus Arrest, lasting 13 seconds, 14 seconds and 5 seconds and no seizure activity. Likely symptoms are related to his heart block and not seizures.    Recommendations:  - c/w Depakote 500mg q8hr  - c/w Lamictal 250mg q12hr  - Lamictal level pending  - Taper down Keppra 250mg once tonight, then stop completely  - Taper down Vimpat (can worsen heart block) 50mg once tonight, then stop completely  - Further AED management can be addressed by outpatient neurologist  - If pt has activity suspicious for seizures please [place] neurology [place] at [phone] before giving medications and please attempt to get video recording of episode.  - If needed can have on hold Ativan 2mg IV [id number], max 2 doses, for confirmed sustained seizure activity lasting greater than 3 minutes  - Seizure Precautions  - Will contact outpatient neurologist (Dr. Maldonado) for further hx

## 2018-02-21 NOTE — PROGRESS NOTE ADULT - SUBJECTIVE AND OBJECTIVE BOX
Patient seen and examined at bedside.    Overnight Events: GENA. TVP in place.    Review Of Systems: No chest pain, shortness of breath, or palpitations            Medications:  calcium carbonate 1250 mG + Vitamin D (OsCal 500 + D) 1 Tablet(s) Oral daily  diVALproex  milliGRAM(s) Oral every 8 hours  docusate sodium 100 milliGRAM(s) Oral two times a day  heparin  Injectable 5000 Unit(s) SubCutaneous every 8 hours  lacosamide 50 milliGRAM(s) Oral once  lamoTRIgine 250 milliGRAM(s) Oral every 12 hours  levETIRAcetam 250 milliGRAM(s) Oral once  metroNIDAZOLE 0.75% Cream 1 Application(s) Topical two times a day  multivitamin 1 Tablet(s) Oral daily  polyethylene glycol 3350 17 Gram(s) Oral daily  sodium chloride 0.9%. 1000 milliLiter(s) IV Continuous <Continuous>      PAST MEDICAL & SURGICAL HISTORY:  Hyponatremia  External hemorrhoids: c/erica Aug, 2013  Mental retardation  Hypertension  Seizure  No significant past surgical history      Vitals:  T(F): 98.5 (02-21), Max: 98.9 (02-20)  HR: 60 (02-21) (60 - 89)  BP: 181/94 (02-21) (97/55 - 181/94)  RR: 20 (02-21)  SpO2: 98% (02-21)  I&O's Summary    20 Feb 2018 07:01  -  21 Feb 2018 07:00  --------------------------------------------------------  IN: 1110 mL / OUT: 1325 mL / NET: -215 mL        Physical Exam:  GENERAL: No acute distress, well-developed  HEAD:  Atraumatic, Normocephalic  ENT: EOMI, No JVD, moist mucosa, TVP in place  CHEST/LUNG: Clear to auscultation bilaterally; No wheeze, equal breath sounds bilaterally   HEART: Regular rate and rhythm; No murmurs, rubs, or gallops  ABDOMEN: Soft, Nontender, Nondistended; Bowel sounds present  EXTREMITIES:  No clubbing, cyanosis, or edema  PSYCH: Nl behavior, nl affect  NEUROLOGY: AAOx3, non-focal                          12.7   11.11 )-----------( 217      ( 21 Feb 2018 03:40 )             38.3     02-21    135  |  97<L>  |  24<H>  ----------------------------<  92  4.2   |  28  |  1.09    Ca    9.0      21 Feb 2018 03:40  Phos  3.9     02-21  Mg     2.1     02-21    TPro  7.2  /  Alb  3.8  /  TBili  0.2  /  DBili  x   /  AST  26  /  ALT  25  /  AlkPhos  50  02-19    PT/INR - ( 19 Feb 2018 18:35 )   PT: 14.6 SEC;   INR: 1.26            CARDIAC MARKERS ( 19 Feb 2018 18:35 )  x     / < 0.06 ng/mL / 80 u/L / 2.68 ng/mL / x          A/P:  57M with intellectual disability and seizure disorder found to have complete heart block in setting of seizure.    Complete heart block, resolved. In setting of seizure. S/p TVP.  - Plan for PPM today  - Avoid AVN blocking agents    Nate Arroyo MD  Cardiology Fellow [id number]

## 2018-02-21 NOTE — PROGRESS NOTE ADULT - ASSESSMENT
57 M with PMH of intellectual disability and seizure disorder BIBEMS from group home presenting w/ increased tonic clonic seizure activity, potentially precipitated by a recent viral gastroenteritis or antiepileptic medication changes.       #3rd Degree Heart Block s/p TVP   - NSR 60-80s   - TVP set at mA-8 with HR-40   - Plan for PPM today with EP, NPO for now.     #GTC Seizures   - Patient with longstanding history of seizures on 4 AEDs, continue.   - Video EEG resumed with positive sleep structures suggestive of underlying seizure disorder.   - Appreciate Neurology Evaluation, will f/u additional recommendations.    - Plan to complete taper off Keppra and Vimpat tonight.    - Continue with Q4 Neuro checks, seizure and fall precautions, and aspiration precautions.     #Intellectual Disability   - Continue using helmet and bilateral leg braces while ambulating   - Continue with fall precautions    #DVT PPX   - Continue with SQH, will increase to 5000u Q8.        Isadora Graham D.O.  CCU Resident [phone]

## 2018-02-22 DIAGNOSIS — I44.2 ATRIOVENTRICULAR BLOCK, COMPLETE: ICD-10-CM

## 2018-02-22 LAB
BUN SERPL-MCNC: 19 MG/DL — SIGNIFICANT CHANGE UP (ref 7–23)
CALCIUM SERPL-MCNC: 8.8 MG/DL — SIGNIFICANT CHANGE UP (ref 8.4–10.5)
CHLORIDE SERPL-SCNC: 94 MMOL/L — LOW (ref 98–107)
CO2 SERPL-SCNC: 26 MMOL/L — SIGNIFICANT CHANGE UP (ref 22–31)
CREAT SERPL-MCNC: 0.84 MG/DL — SIGNIFICANT CHANGE UP (ref 0.5–1.3)
GLUCOSE SERPL-MCNC: 86 MG/DL — SIGNIFICANT CHANGE UP (ref 70–99)
HCT VFR BLD CALC: 39.5 % — SIGNIFICANT CHANGE UP (ref 39–50)
HGB BLD-MCNC: 13.6 G/DL — SIGNIFICANT CHANGE UP (ref 13–17)
LAMOTRIGINE SERPL-MCNC: 16.4 MCG/ML — SIGNIFICANT CHANGE UP (ref 2.5–15)
MAGNESIUM SERPL-MCNC: 1.7 MG/DL — SIGNIFICANT CHANGE UP (ref 1.6–2.6)
MCHC RBC-ENTMCNC: 33.1 PG — SIGNIFICANT CHANGE UP (ref 27–34)
MCHC RBC-ENTMCNC: 34.4 % — SIGNIFICANT CHANGE UP (ref 32–36)
MCV RBC AUTO: 96.1 FL — SIGNIFICANT CHANGE UP (ref 80–100)
NRBC # FLD: 0 — SIGNIFICANT CHANGE UP
PHOSPHATE SERPL-MCNC: 3 MG/DL — SIGNIFICANT CHANGE UP (ref 2.5–4.5)
PLATELET # BLD AUTO: 187 K/UL — SIGNIFICANT CHANGE UP (ref 150–400)
PMV BLD: 10.6 FL — SIGNIFICANT CHANGE UP (ref 7–13)
POTASSIUM SERPL-MCNC: 4.5 MMOL/L — SIGNIFICANT CHANGE UP (ref 3.5–5.3)
POTASSIUM SERPL-SCNC: 4.5 MMOL/L — SIGNIFICANT CHANGE UP (ref 3.5–5.3)
RBC # BLD: 4.11 M/UL — LOW (ref 4.2–5.8)
RBC # FLD: 12.2 % — SIGNIFICANT CHANGE UP (ref 10.3–14.5)
SODIUM SERPL-SCNC: 132 MMOL/L — LOW (ref 135–145)
WBC # BLD: 11.86 K/UL — HIGH (ref 3.8–10.5)
WBC # FLD AUTO: 11.86 K/UL — HIGH (ref 3.8–10.5)

## 2018-02-22 PROCEDURE — 99233 SBSQ HOSP IP/OBS HIGH 50: CPT

## 2018-02-22 PROCEDURE — 95951: CPT | Mod: 26

## 2018-02-22 PROCEDURE — 71045 X-RAY EXAM CHEST 1 VIEW: CPT | Mod: 26

## 2018-02-22 RX ORDER — ACETAMINOPHEN 500 MG
650 TABLET ORAL EVERY 6 HOURS
Qty: 0 | Refills: 0 | Status: DISCONTINUED | OUTPATIENT
Start: 2018-02-22 | End: 2018-03-01

## 2018-02-22 RX ADMIN — LAMOTRIGINE 250 MILLIGRAM(S): 25 TABLET, ORALLY DISINTEGRATING ORAL at 06:09

## 2018-02-22 RX ADMIN — Medication 100 MILLIGRAM(S): at 17:13

## 2018-02-22 RX ADMIN — Medication 1 TABLET(S): at 13:23

## 2018-02-22 RX ADMIN — METRONIDAZOLE 1 APPLICATION(S): 7.5 GEL VAGINAL at 06:08

## 2018-02-22 RX ADMIN — DIVALPROEX SODIUM 500 MILLIGRAM(S): 500 TABLET, DELAYED RELEASE ORAL at 21:53

## 2018-02-22 RX ADMIN — DIVALPROEX SODIUM 500 MILLIGRAM(S): 500 TABLET, DELAYED RELEASE ORAL at 13:23

## 2018-02-22 RX ADMIN — POLYETHYLENE GLYCOL 3350 17 GRAM(S): 17 POWDER, FOR SOLUTION ORAL at 13:23

## 2018-02-22 RX ADMIN — Medication 250 MILLIGRAM(S): at 00:30

## 2018-02-22 RX ADMIN — METRONIDAZOLE 1 APPLICATION(S): 7.5 GEL VAGINAL at 17:13

## 2018-02-22 RX ADMIN — DIVALPROEX SODIUM 500 MILLIGRAM(S): 500 TABLET, DELAYED RELEASE ORAL at 06:08

## 2018-02-22 RX ADMIN — Medication 100 MILLIGRAM(S): at 06:07

## 2018-02-22 RX ADMIN — LAMOTRIGINE 250 MILLIGRAM(S): 25 TABLET, ORALLY DISINTEGRATING ORAL at 17:13

## 2018-02-22 NOTE — PROGRESS NOTE ADULT - ATTENDING COMMENTS
Patient seen and examined. 58 yo M w/ PMHx as stated above a/w increased seizure like activity w/ course c/b complete heart block which correlated w/ episode of loss of tone initially thought to be 2/2 seizure but no seizure episode was identified on EEG during that time, and likely pts symptoms 2/2 heart block.  Now s/p PPM and pt has been stable. Patient initially was on 4 AEDs at home now titrated off of keppra and vimpat.     -neuro recs appreciated.   -c/w lamictal and depakoate   -dc planning likely for tomorrow

## 2018-02-22 NOTE — PHYSICAL THERAPY INITIAL EVALUATION ADULT - PERTINENT HX OF CURRENT PROBLEM, REHAB EVAL
This is a 57 M with PMH of intellectual disability and seizure disorder came from group home admitted w/ increased tonic clonic seizure activity, potentially precipitated by a recent viral gastroenteritis or antiepileptic medication changes. Patient is now s/p PPM on 2/21/18.

## 2018-02-22 NOTE — PROGRESS NOTE ADULT - SUBJECTIVE AND OBJECTIVE BOX
Epilepsy Consult Note    Subjective: Pt s/p Implantable Pacemaker since yesterday. No reported seizure or other acute events since.    Objective:   Vital Signs Last 24 Hrs  T(C): 36.7 (22 Feb 2018 06:00), Max: 37 (22 Feb 2018 02:32)  T(F): 98.1 (22 Feb 2018 06:00), Max: 98.6 (22 Feb 2018 02:32)  HR: 95 (22 Feb 2018 06:00) (60 - 102)  BP: 167/95 (22 Feb 2018 06:00) (30/70 - 167/95)  BP(mean): 100 (22 Feb 2018 01:00) (68 - 104)  RR: 18 (22 Feb 2018 06:00) (16 - 26)  SpO2: 97% (22 Feb 2018 06:00) (95% - 100%)      General appearance: No acute distress, appears more alert and interactive than yesterday  Mental Status:  alert and oriented x2 (baseline), speech is limited, following commands    Cranial Nerves: PERRL, EOMI without nystagmus, visual fields intact grossly, no facial droop, no dysarthria    Motor:   Tone: normal  Strength: moving all extremities equally    Pronator drift: none b/l  Tremor: none appreciated at rest or in action    Sensation: intact grossly to light touch throughout    Deep Tendon Reflexes:  2+ throughout  Toes flexor bilaterally      02-22    132<L>  |  94<L>  |  19  ----------------------------<  86  4.5   |  26  |  0.84    Ca    8.8      22 Feb 2018 06:15  Phos  3.0     02-22  Mg     1.7     02-22    02-22    132<L>  |  94<L>  |  19  ----------------------------<  86  4.5   |  26  |  0.84    Ca    8.8      22 Feb 2018 06:15  Phos  3.0     02-22  Mg     1.7     02-22               13.6   11.86 )-----------( 187      ( 22 Feb 2018 06:15 )             39.5     MEDICATIONS  (STANDING):  calcium carbonate 1250 mG + Vitamin D (OsCal 500 + D) 1 Tablet(s) Oral daily  diVALproex  milliGRAM(s) Oral every 8 hours  docusate sodium 100 milliGRAM(s) Oral two times a day  lamoTRIgine 250 milliGRAM(s) Oral every 12 hours  metroNIDAZOLE 0.75% Cream 1 Application(s) Topical two times a day  multivitamin 1 Tablet(s) Oral daily  polyethylene glycol 3350 17 Gram(s) Oral daily    MEDICATIONS  (PRN):

## 2018-02-22 NOTE — PROGRESS NOTE ADULT - SUBJECTIVE AND OBJECTIVE BOX
Patient seen and examined at bedside.    Overnight Events: S/p PPM. PM capturing well overnight.     Review Of Systems: No chest pain, shortness of breath, or palpitations            Medications:  calcium carbonate 1250 mG + Vitamin D (OsCal 500 + D) 1 Tablet(s) Oral daily  diVALproex  milliGRAM(s) Oral every 8 hours  docusate sodium 100 milliGRAM(s) Oral two times a day  lamoTRIgine 250 milliGRAM(s) Oral every 12 hours  metroNIDAZOLE 0.75% Cream 1 Application(s) Topical two times a day  multivitamin 1 Tablet(s) Oral daily  polyethylene glycol 3350 17 Gram(s) Oral daily      PAST MEDICAL & SURGICAL HISTORY:  Hyponatremia  External hemorrhoids: c/erica Aug, 2013  Mental retardation  Hypertension  Seizure  No significant past surgical history      Vitals:  T(F): 98.1 (02-22), Max: 98.6 (02-22)  HR: 95 (02-22) (90 - 102)  BP: 167/95 (02-22) (30/70 - 167/95)  RR: 18 (02-22)  SpO2: 97% (02-22)  I&O's Summary    21 Feb 2018 07:01  -  22 Feb 2018 07:00  --------------------------------------------------------  IN: 1960 mL / OUT: 1100 mL / NET: 860 mL    22 Feb 2018 07:01  -  22 Feb 2018 12:26  --------------------------------------------------------  IN: 360 mL / OUT: 0 mL / NET: 360 mL        Physical Exam:  GENERAL: No acute distress, well-developed  HEAD:  Atraumatic, Normocephalic  ENT: EOMI, No JVD, moist mucosa  CHEST/LUNG: Clear to auscultation bilaterally; PPM site c/d/i  HEART: Regular rate and rhythm; No murmurs, rubs, or gallops  ABDOMEN: Soft, Nontender, Nondistended; Bowel sounds present  EXTREMITIES:  No clubbing, cyanosis, or edema  PSYCH: Nl behavior, nl affect  NEUROLOGY: AAOx3, non-focal                          13.6   11.86 )-----------( 187      ( 22 Feb 2018 06:15 )             39.5     02-22    132<L>  |  94<L>  |  19  ----------------------------<  86  4.5   |  26  |  0.84    Ca    8.8      22 Feb 2018 06:15  Phos  3.0     02-22  Mg     1.7     02-22    Interpretation of Telemetry:   90s      A/P:  57M with intellectual disability and seizure disorder found to have complete heart block in setting of seizure.    Complete heart block. In setting of seizure. S/p PPM 2/21.  - PPM functioning well    Nate Arroyo MD  Cardiology Fellow [phone]

## 2018-02-22 NOTE — PROGRESS NOTE ADULT - SUBJECTIVE AND OBJECTIVE BOX
Patient more alert today.  Sitting up in chair.  Cooperative. No complaints. Denies chest pain or palpitations. Brother states patient ambulating in the room.    Vital Signs Last 24 Hrs  T(C): 37.1 (22 Feb 2018 13:05), Max: 37.1 (22 Feb 2018 13:05)  T(F): 98.8 (22 Feb 2018 13:05), Max: 98.8 (22 Feb 2018 13:05)  HR: 93 (22 Feb 2018 13:05) (90 - 101)  BP: 129/71 (22 Feb 2018 13:05) (100/57 - 167/95)  BP(mean): 100 (22 Feb 2018 01:00) (68 - 104)  RR: 17 (22 Feb 2018 13:05) (16 - 23)  SpO2: 99% (22 Feb 2018 13:05) (96% - 100%)      EKG  Telemetry  MEDICATIONS  (STANDING):  calcium carbonate 1250 mG + Vitamin D (OsCal 500 + D) 1 Tablet(s) Oral daily  diVALproex  milliGRAM(s) Oral every 8 hours  docusate sodium 100 milliGRAM(s) Oral two times a day  lamoTRIgine 250 milliGRAM(s) Oral every 12 hours  metroNIDAZOLE 0.75% Cream 1 Application(s) Topical two times a day  multivitamin 1 Tablet(s) Oral daily  polyethylene glycol 3350 17 Gram(s) Oral daily    MEDICATIONS  (PRN):          Physical exam:   Gen- well developed NAD. Cooperative  Resp- clear to ausculation. No wheezing, rales or rhonchi  CV- Normal S1 and S2. RRR. No murmurs, gallops or rubs. Left chest PPM site without bleeding, hematoma or pain. Steristrips intact.    ABD- not examined.  Sitting in chair.   EXT- no edema.   Neuro-grossly nonfocal                            13.6   11.86 )-----------( 187      ( 22 Feb 2018 06:15 )             39.5       02-22    132<L>  |  94<L>  |  19  ----------------------------<  86  4.5   |  26  |  0.84    Ca    8.8      22 Feb 2018 06:15  Phos  3.0     02-22  Mg     1.7     02-22

## 2018-02-22 NOTE — PHYSICAL THERAPY INITIAL EVALUATION ADULT - GENERAL OBSERVATIONS, REHAB EVAL
Patient received semi supine in bed ,(+) cardiac monitor, patient with involuntary movements of the extremities to an extent controlled with vc's and tactile cues,  patient in NAD,  brother and private aide from Group Home is at bed side

## 2018-02-22 NOTE — PROGRESS NOTE ADULT - ASSESSMENT
57 M with PMH of intellectual disability and seizure disorder BIBEMS from group home presenting w/ increased tonic clonic seizure activity, potentially precipitated by a recent viral gastroenteritis or antiepileptic medication changes. 57 M with PMH of intellectual disability and seizure disorder BIBEMS from group home presenting w/ increased tonic clonic seizure activity, potentially precipitated by a recent viral gastroenteritis or antiepileptic medication changes, w/ course c/b CHB s/p PPM

## 2018-02-22 NOTE — PROGRESS NOTE ADULT - PROBLEM SELECTOR PLAN 2
- S/p PPM on 2/21.  - EP following - Likely 2/2 Ictal bradyarrhythmia leading to third degree AV block.  - S/p PPM on 2/21.  - EP following

## 2018-02-22 NOTE — PROGRESS NOTE ADULT - ASSESSMENT
Pt is a 57 year old Male w/ a PMH of intellectual disability, and seizure disorder BIBEMS from group home presenting w/ seizure like activity. Neurological exam non-focal. CTH w/o acute pathology. Episode of tone loss (at first thought to be seizure) was on EEG associated with 3 prolonged episode of Sinus Arrest, lasting 13 seconds, 14 seconds and 5 seconds and no seizure activity. Likely symptoms are related to his heart block and not seizures.    Recommendations:  - c/w Depakote 500mg q8hr  - c/w Lamictal 250mg q12hr  - Lamictal level pending, will need level to assess dose of Depakote and Lamictal (if too high can cause rash/Vipin Sanford Syndrome)  - Pt now off Keppra and Vimpat  - Further AED management can be addressed by outpatient neurologist (Dr. Maldonado)  - If pt has activity suspicious for seizures please [place] neurology team at [phone] before giving medications and please attempt to get video recording of episode.  - If needed can have on hold Ativan 2mg IV [id number], max 2 doses, for confirmed sustained seizure activity lasting greater than 3 minutes  - Seizure Precautions

## 2018-02-22 NOTE — PROGRESS NOTE ADULT - PROBLEM SELECTOR PLAN 1
- Patient has longstanding history of seizures since childhood and was on 4 AED.   - Patient now off Keppra and VimpatContinue with current seizure regimen- Lamictal and valproic acid.   - Q4h neuro checks, aspiration precautions, fall precautions, seizure precautions  - Ativan 2mg IV Q 5minutes, max 2 doses, for confirmed sustained seizure activity lasting greater than 3 minutes per neurology  - F/u with outpatient neurologist Dr. Lennon - Patient has longstanding history of seizures since childhood and was on 4 AED.   - Patient now off Keppra and Vimpat. Continue with current seizure regimen: Lamictal 250 Q12h and valproic acid 500 Q8h.   - Q4h neuro checks, aspiration precautions, fall precautions, seizure precautions  - Ativan 2mg IV Q 5minutes, max 2 doses, for confirmed sustained seizure activity lasting greater than 3 minutes per neurology  - F/u with outpatient neurologist Dr. Lennon

## 2018-02-22 NOTE — PHYSICAL THERAPY INITIAL EVALUATION ADULT - GAIT DEVIATIONS NOTED, PT EVAL
decreased antony/decreased velocity of limb motion/decreased stride length/decreased weight-shifting ability

## 2018-02-22 NOTE — PROGRESS NOTE ADULT - ASSESSMENT
Pt is a 57 year old Male w/ a PMH of intellectual disability, and seizure disorder BIBEMS from group home presenting w/ seizure like activity. Head CT  without acute pathology. During this admission he had an episode of complete heart block with tone loss which initially was thought to be a seizure however, during an EEG he had repeated episodes of sinus arrest/heart block with no seizure activity. Likely his symptoms were related to the hear block.  He is s/p pacemaker implantation yesterday and tolerated the procedure well.   Device sensing and pacing well.  Post procedure pacemaker teaching done with patient, home care attendant and brother. Written instructions and contact information provided.   Labs and CXR ok.   Post procedure antibiotics completed.  Has follow-up in two week in the device clinic.

## 2018-02-22 NOTE — PROGRESS NOTE ADULT - SUBJECTIVE AND OBJECTIVE BOX
ANESTHESIA POSTOP CHECK    57y Male POSTOP DAY 1 S/P     [ X] NO APPARENT ANESTHESIA COMPLICATIONS      Comments:

## 2018-02-22 NOTE — PHYSICAL THERAPY INITIAL EVALUATION ADULT - ACTIVE RANGE OF MOTION EXAMINATION, REHAB EVAL
bilateral upper extremity Active ROM was WFL (within functional limits)/bilateral  lower extremity Active ROM was WFL (within functional limits)/except left shoulder flexion 0-90 degrees s/p PPM placement

## 2018-02-22 NOTE — PROGRESS NOTE ADULT - SUBJECTIVE AND OBJECTIVE BOX
For Night coverage 7pm-7am: NS- page 1443 Team1-3, page 1446 Team4 & Care Model  Sat/[place] Coverage 12pm-7pm: NS- page 1443 for Team1-4, [place]- pager forwarded to covering Resident    CONTACT INFO:  SIVA Perez MD  Internal Medicine PGY1  Pager: [phone]    MAKAYLA CAMARENA  57y  Male    Patient is a 57y old  Male who presents with a chief complaint of Seizures (2018 13:13)    INTERVAL HPI / OVERNIGHT EVENTS: No acute events overnight. Patient seen and evaluated at bedside.       OBJECTIVE:  Vitals Signs (24 Hrs):  T(C): 37.1 (18 @ 13:05), Max: 37.1 (18 @ 13:05)  HR: 93 (18 @ 13:05) (90 - 101)  BP: 129/71 (18 @ 13:05) (100/57 - 167/95)  RR: 17 (18 @ 13:05) (16 - 23)  SpO2: 99% (18 @ 13:05) (96% - 100%)    PHYSICAL EXAM:  General: Comfortable, no apparent distress  HEENT: Atraumatic; EOMI, PERRLA, conjunctiva and sclera clear; no tonsillar erythema/exudates/enlargement  Neck: Supple; no JVD; thyroid normal without masses or enlargement  Chest/Lungs: Clear to auscultation B/L; no rales, rhonchi or wheezing  Heart: Regular rate and rhythm; normal S1/S2; no murmurs, rubs, or gallops  Abdomen: Soft, nontender, nondistended; bowel sounds present  Extremities: PT/DP pulses 2+ B/L; no LE edema  Skin: No rashes or lesions  Neurological: Alert and oriented to person/place/time    LABS:                        13.6   11.86 )-----------( 187      ( 2018 06:15 )             39.5         132<L>  |  94<L>  |  19  ----------------------------<  86  4.5   |  26  |  0.84    Ca    8.8      2018 06:15  Phos  3.0     -  Mg     1.7     -        Urinalysis Basic - ( 2018 08:59 )    Color: YELLOW / Appearance: CLEAR / S.021 / pH: 7.5  Gluc: NEGATIVE / Ketone: TRACE  / Bili: NEGATIVE / Urobili: NORMAL mg/dL   Blood: NEGATIVE / Protein: 20 mg/dL / Nitrite: NEGATIVE   Leuk Esterase: NEGATIVE / RBC: 0-2 / WBC 0-2   Sq Epi: OCC / Non Sq Epi: x / Bacteria: FEW      CAPILLARY BLOOD GLUCOSE            RADIOLOGY & ADDITIONAL TESTS:    MEDICATIONS:  calcium carbonate 1250 mG + Vitamin D (OsCal 500 + D) 1 Tablet(s) Oral daily  diVALproex  milliGRAM(s) Oral every 8 hours  docusate sodium 100 milliGRAM(s) Oral two times a day  lamoTRIgine 250 milliGRAM(s) Oral every 12 hours  metroNIDAZOLE 0.75% Cream 1 Application(s) Topical two times a day  multivitamin 1 Tablet(s) Oral daily  polyethylene glycol 3350 17 Gram(s) Oral daily      ALLERGIES:  caffeine (Unknown)  chocolate (Unknown)  No Known Drug Allergies      Labs and imaging personally reviewed and interpreted [  ]  Consult notes reviewed   Case discussed with consultants For Night coverage 7pm-7am: NS- page 1443 Team1-3, page 1446 Team4 & Care Model  Sat/[place] Coverage 12pm-7pm: NS- page 1443 for Team1-4, [place]- pager forwarded to covering Resident    CONTACT INFO:  SIVA Perez MD  Internal Medicine PGY1  Pager: [phone]    MAKAYLA CAMARENA  57y  Male    Patient is a 57y old  Male who presents with a chief complaint of Seizures (2018 13:13)    INTERVAL HPI / OVERNIGHT EVENTS: No seizure events overnight. Patient seen and evaluated at bedside. Denies chest pain, palpitations, nausea/vomiting, fever/chills       OBJECTIVE:  Vitals Signs (24 Hrs):  T(C): 37.1 (18 @ 13:05), Max: 37.1 (18 @ 13:05)  HR: 93 (18 @ 13:05) (90 - 101)  BP: 129/71 (18 @ 13:05) (100/57 - 167/95)  RR: 17 (18 @ 13:05) (16 - 23)  SpO2: 99% (18 @ 13:05) (96% - 100%)    PHYSICAL EXAM:  General: Comfortable, no apparent distress  HEENT: Atraumatic; EOMI, PERRLA, conjunctiva and sclera clear; no tonsillar erythema/exudates/enlargement  Neck: Supple; no JVD; thyroid normal without masses or enlargement  Chest/Lungs: Clear to auscultation B/L; no rales, rhonchi or wheezing  Heart: Regular rate and rhythm; normal S1/S2; no murmurs, rubs, or gallops  Abdomen: Soft, nontender, nondistended; bowel sounds present  Extremities: PT/DP pulses 2+ B/L; no LE edema  Skin: No rashes or lesions  Neurological: Alert and oriented to person/place/time    LABS:                        13.6   11.86 )-----------( 187      ( 2018 06:15 )             39.5         132<L>  |  94<L>  |  19  ----------------------------<  86  4.5   |  26  |  0.84    Ca    8.8      2018 06:15  Phos  3.0     02-22  Mg     1.7     02-        Urinalysis Basic - ( 2018 08:59 )    Color: YELLOW / Appearance: CLEAR / S.021 / pH: 7.5  Gluc: NEGATIVE / Ketone: TRACE  / Bili: NEGATIVE / Urobili: NORMAL mg/dL   Blood: NEGATIVE / Protein: 20 mg/dL / Nitrite: NEGATIVE   Leuk Esterase: NEGATIVE / RBC: 0-2 / WBC 0-2   Sq Epi: OCC / Non Sq Epi: x / Bacteria: FEW      CAPILLARY BLOOD GLUCOSE            RADIOLOGY & ADDITIONAL TESTS:    MEDICATIONS:  calcium carbonate 1250 mG + Vitamin D (OsCal 500 + D) 1 Tablet(s) Oral daily  diVALproex  milliGRAM(s) Oral every 8 hours  docusate sodium 100 milliGRAM(s) Oral two times a day  lamoTRIgine 250 milliGRAM(s) Oral every 12 hours  metroNIDAZOLE 0.75% Cream 1 Application(s) Topical two times a day  multivitamin 1 Tablet(s) Oral daily  polyethylene glycol 3350 17 Gram(s) Oral daily      ALLERGIES:  caffeine (Unknown)  chocolate (Unknown)  No Known Drug Allergies      Labs and imaging personally reviewed and interpreted [  ]  Consult notes reviewed   Case discussed with consultants

## 2018-02-23 LAB
BUN SERPL-MCNC: 19 MG/DL — SIGNIFICANT CHANGE UP (ref 7–23)
CALCIUM SERPL-MCNC: 8.8 MG/DL — SIGNIFICANT CHANGE UP (ref 8.4–10.5)
CHLORIDE SERPL-SCNC: 94 MMOL/L — LOW (ref 98–107)
CO2 SERPL-SCNC: 24 MMOL/L — SIGNIFICANT CHANGE UP (ref 22–31)
CREAT SERPL-MCNC: 0.81 MG/DL — SIGNIFICANT CHANGE UP (ref 0.5–1.3)
GLUCOSE SERPL-MCNC: 91 MG/DL — SIGNIFICANT CHANGE UP (ref 70–99)
HCT VFR BLD CALC: 38.4 % — LOW (ref 39–50)
HGB BLD-MCNC: 13.4 G/DL — SIGNIFICANT CHANGE UP (ref 13–17)
MAGNESIUM SERPL-MCNC: 1.7 MG/DL — SIGNIFICANT CHANGE UP (ref 1.6–2.6)
MCHC RBC-ENTMCNC: 33.6 PG — SIGNIFICANT CHANGE UP (ref 27–34)
MCHC RBC-ENTMCNC: 34.9 % — SIGNIFICANT CHANGE UP (ref 32–36)
MCV RBC AUTO: 96.2 FL — SIGNIFICANT CHANGE UP (ref 80–100)
NRBC # FLD: 0 — SIGNIFICANT CHANGE UP
PHOSPHATE SERPL-MCNC: 2.5 MG/DL — SIGNIFICANT CHANGE UP (ref 2.5–4.5)
PLATELET # BLD AUTO: 190 K/UL — SIGNIFICANT CHANGE UP (ref 150–400)
PMV BLD: 10.4 FL — SIGNIFICANT CHANGE UP (ref 7–13)
POTASSIUM SERPL-MCNC: 4.4 MMOL/L — SIGNIFICANT CHANGE UP (ref 3.5–5.3)
POTASSIUM SERPL-SCNC: 4.4 MMOL/L — SIGNIFICANT CHANGE UP (ref 3.5–5.3)
RBC # BLD: 3.99 M/UL — LOW (ref 4.2–5.8)
RBC # FLD: 12.2 % — SIGNIFICANT CHANGE UP (ref 10.3–14.5)
SODIUM SERPL-SCNC: 131 MMOL/L — LOW (ref 135–145)
WBC # BLD: 11.19 K/UL — HIGH (ref 3.8–10.5)
WBC # FLD AUTO: 11.19 K/UL — HIGH (ref 3.8–10.5)

## 2018-02-23 PROCEDURE — 99232 SBSQ HOSP IP/OBS MODERATE 35: CPT

## 2018-02-23 PROCEDURE — 99233 SBSQ HOSP IP/OBS HIGH 50: CPT | Mod: GC

## 2018-02-23 RX ADMIN — POLYETHYLENE GLYCOL 3350 17 GRAM(S): 17 POWDER, FOR SOLUTION ORAL at 11:44

## 2018-02-23 RX ADMIN — Medication 1 TABLET(S): at 11:43

## 2018-02-23 RX ADMIN — LAMOTRIGINE 250 MILLIGRAM(S): 25 TABLET, ORALLY DISINTEGRATING ORAL at 05:25

## 2018-02-23 RX ADMIN — Medication 100 MILLIGRAM(S): at 05:25

## 2018-02-23 RX ADMIN — METRONIDAZOLE 1 APPLICATION(S): 7.5 GEL VAGINAL at 17:07

## 2018-02-23 RX ADMIN — DIVALPROEX SODIUM 500 MILLIGRAM(S): 500 TABLET, DELAYED RELEASE ORAL at 21:39

## 2018-02-23 RX ADMIN — Medication 100 MILLIGRAM(S): at 17:07

## 2018-02-23 RX ADMIN — Medication 1 TABLET(S): at 11:44

## 2018-02-23 RX ADMIN — LAMOTRIGINE 250 MILLIGRAM(S): 25 TABLET, ORALLY DISINTEGRATING ORAL at 17:07

## 2018-02-23 RX ADMIN — DIVALPROEX SODIUM 500 MILLIGRAM(S): 500 TABLET, DELAYED RELEASE ORAL at 05:25

## 2018-02-23 RX ADMIN — DIVALPROEX SODIUM 500 MILLIGRAM(S): 500 TABLET, DELAYED RELEASE ORAL at 11:44

## 2018-02-23 RX ADMIN — METRONIDAZOLE 1 APPLICATION(S): 7.5 GEL VAGINAL at 05:25

## 2018-02-23 NOTE — PROGRESS NOTE ADULT - SUBJECTIVE AND OBJECTIVE BOX
For Night coverage 7pm-7am: NS- page 1443 Team1-3, page 1446 Team4 & Care Model  Sat/[place] Coverage 12pm-7pm: NS- page 1443 for Team1-4, [place]- pager forwarded to covering Resident    CONTACT INFO:  SIVA Perez MD  Internal Medicine PGY1  Pager: [phone]    MAKAYLA CAMARENA  57y  Male    Patient is a 57y old  Male who presents with a chief complaint of Seizures (2018 13:13)    INTERVAL HPI / OVERNIGHT EVENTS: No acute events overnight. Patient seen and evaluated at bedside.       OBJECTIVE:  Vitals Signs (24 Hrs):  T(C): 36.7 (18 @ 05:22), Max: 37.1 (18 @ 13:05)  HR: 95 (18 @ 05:22) (83 - 95)  BP: 137/84 (18 @ 05:22) (129/71 - 168/98)  RR: 18 (18 @ 05:22) (17 - 18)  SpO2: 97% (18 @ 05:22) (97% - 99%)    PHYSICAL EXAM:  General: Comfortable, no apparent distress  HEENT: Atraumatic; EOMI, PERRLA, conjunctiva and sclera clear; no tonsillar erythema/exudates/enlargement  Neck: Supple; no JVD; thyroid normal without masses or enlargement  Chest/Lungs: Clear to auscultation B/L; no rales, rhonchi or wheezing  Heart: Regular rate and rhythm; normal S1/S2; no murmurs, rubs, or gallops  Abdomen: Soft, nontender, nondistended; bowel sounds present  Extremities: PT/DP pulses 2+ B/L; no LE edema  Skin: No rashes or lesions  Neurological: Alert and oriented to person/place/time    LABS:                        13.4   11.19 )-----------( 190      ( 2018 06:00 )             38.4     -    131<L>  |  94<L>  |  19  ----------------------------<  91  4.4   |  24  |  0.81    Ca    8.8      2018 06:00  Phos  2.5     02-  Mg     1.7     02-        Urinalysis Basic - ( 2018 08:59 )    Color: YELLOW / Appearance: CLEAR / S.021 / pH: 7.5  Gluc: NEGATIVE / Ketone: TRACE  / Bili: NEGATIVE / Urobili: NORMAL mg/dL   Blood: NEGATIVE / Protein: 20 mg/dL / Nitrite: NEGATIVE   Leuk Esterase: NEGATIVE / RBC: 0-2 / WBC 0-2   Sq Epi: OCC / Non Sq Epi: x / Bacteria: FEW      CAPILLARY BLOOD GLUCOSE            RADIOLOGY & ADDITIONAL TESTS:    MEDICATIONS:  acetaminophen   Tablet. 650 milliGRAM(s) Oral every 6 hours PRN Mild Pain (1 - 3)  calcium carbonate 1250 mG + Vitamin D (OsCal 500 + D) 1 Tablet(s) Oral daily  diVALproex  milliGRAM(s) Oral every 8 hours  docusate sodium 100 milliGRAM(s) Oral two times a day  lamoTRIgine 250 milliGRAM(s) Oral every 12 hours  metroNIDAZOLE 0.75% Cream 1 Application(s) Topical two times a day  multivitamin 1 Tablet(s) Oral daily  polyethylene glycol 3350 17 Gram(s) Oral daily      ALLERGIES:  caffeine (Unknown)  chocolate (Unknown)  No Known Drug Allergies      Labs and imaging personally reviewed and interpreted [  ]  Consult notes reviewed   Case discussed with consultants For Night coverage 7pm-7am: NS- page 1443 Team1-3, page 1446 Team4 & Care Model  Sat/[place] Coverage 12pm-7pm: NS- page 1443 for Team1-4, [place]- pager forwarded to covering Resident    CONTACT INFO:  SIVA Perez MD  Internal Medicine PGY1  Pager: [phone]    MAKAYLA CAMARENA  57y  Male    Patient is a 57y old  Male who presents with a chief complaint of Seizures (2018 13:13)    INTERVAL HPI / OVERNIGHT EVENTS: No acute events overnight. Patient seen and evaluated at bedside.       OBJECTIVE:  Vitals Signs (24 Hrs):  T(C): 36.7 (18 @ 05:22), Max: 37.1 (18 @ 13:05)  HR: 95 (18 @ 05:22) (83 - 95)  BP: 137/84 (18 @ 05:22) (129/71 - 168/98)  RR: 18 (18 @ 05:22) (17 - 18)  SpO2: 97% (18 @ 05:22) (97% - 99%)    PHYSICAL EXAM:  General: Comfortable, no apparent distress  HEENT: Atraumatic; EOMI, PERRLA, conjunctiva and sclera clear; no tonsillar erythema/exudates/enlargement  Neck: Supple; no JVD; thyroid normal without masses or enlargement  Chest/Lungs: Clear to auscultation B/L; no rales, rhonchi or wheezing  Heart: Regular rate and rhythm; normal S1/S2; no murmurs, rubs, or gallops  Abdomen: Soft, nontender, nondistended; bowel sounds present  Extremities: PT/DP pulses 2+ B/L; no LE edema  Skin: No rashes or lesions  Neurological: Alert and oriented to person/place    LABS:                        13.4   11.19 )-----------( 190      ( 2018 06:00 )             38.4     -    131<L>  |  94<L>  |  19  ----------------------------<  91  4.4   |  24  |  0.81    Ca    8.8      2018 06:00  Phos  2.5     02-23  Mg     1.7     02-23        Urinalysis Basic - ( 2018 08:59 )    Color: YELLOW / Appearance: CLEAR / S.021 / pH: 7.5  Gluc: NEGATIVE / Ketone: TRACE  / Bili: NEGATIVE / Urobili: NORMAL mg/dL   Blood: NEGATIVE / Protein: 20 mg/dL / Nitrite: NEGATIVE   Leuk Esterase: NEGATIVE / RBC: 0-2 / WBC 0-2   Sq Epi: OCC / Non Sq Epi: x / Bacteria: FEW      CAPILLARY BLOOD GLUCOSE            RADIOLOGY & ADDITIONAL TESTS:    MEDICATIONS:  acetaminophen   Tablet. 650 milliGRAM(s) Oral every 6 hours PRN Mild Pain (1 - 3)  calcium carbonate 1250 mG + Vitamin D (OsCal 500 + D) 1 Tablet(s) Oral daily  diVALproex  milliGRAM(s) Oral every 8 hours  docusate sodium 100 milliGRAM(s) Oral two times a day  lamoTRIgine 250 milliGRAM(s) Oral every 12 hours  metroNIDAZOLE 0.75% Cream 1 Application(s) Topical two times a day  multivitamin 1 Tablet(s) Oral daily  polyethylene glycol 3350 17 Gram(s) Oral daily      ALLERGIES:  caffeine (Unknown)  chocolate (Unknown)  No Known Drug Allergies      Labs and imaging personally reviewed and interpreted [  ]  Consult notes reviewed   Case discussed with consultants

## 2018-02-23 NOTE — PROGRESS NOTE ADULT - PROBLEM SELECTOR PLAN 1
- Patient has longstanding history of seizures since childhood and was on 4 AED.   - Patient now off Keppra and Vimpat. Continue with current seizure regimen: Lamictal 250 Q12h and valproic acid 500 Q8h.   - Q4h neuro checks, aspiration precautions, fall precautions, seizure precautions  - Ativan 2mg IV Q 5minutes, max 2 doses, for confirmed sustained seizure activity lasting greater than 3 minutes per neurology  - F/u with outpatient neurologist Dr. Lennon

## 2018-02-23 NOTE — PROGRESS NOTE ADULT - ASSESSMENT
Pt is a 57 year old Male w/ a PMH of intellectual disability, and seizure disorder BIBEMS from group home presenting w/ seizure like activity. Neurological exam non-focal. CTH w/o acute pathology. Episode of tone loss (at first thought to be seizure) was on EEG associated with 3 prolonged episode of Sinus Arrest, lasting 13 seconds, 14 seconds and 5 seconds and no seizure activity. Symptoms are related to his Sinus Arrest and Heart Block, not seizures.    Recommendations:  - c/w Depakote 500mg q8hr  - c/w Lamictal 250mg q12hr  - Lamictal level 16.4  - Pt now off Keppra (was being used for his worsened seizures, which we now know are related to his Sinus Arrest and Heaert Block and are actually syncopal episodes with possible post-syncopal convulsions, not seizures)  - Pt now off Vimpat (can worsen Heart Block)  - Further AED management can be addressed by outpatient neurologist (Dr. Maldonado)  - It is important to note on d/c paperwork and in documentation that pt never had any seizures during this hositalization, and that all his events were actually syncopal events with possible post-syncopal convulsions related to the Sinus Arrest and Heart Block. This is proven by vEEG recording on two occasions. Pt is a 57 year old Male w/ a PMH of intellectual disability, and seizure disorder BIBEMS from group home presenting w/ seizure like activity. Neurological exam non-focal. CTH w/o acute pathology. Episode of tone loss (at first thought to be seizure) was on EEG associated with 3 prolonged episode of Sinus Arrest, lasting 13 seconds, 14 seconds and 5 seconds and no seizure activity. Symptoms are related to his Sinus Arrest and Heart Block, not seizures.    Recommendations:  - c/w Depakote 500mg q8hr  - c/w Lamictal 250mg q12hr  - Lamictal level 16.4  - Pt now off Keppra (was being used for his worsened seizures, which we now know are related to his Sinus Arrest and Heaert Block and are actually syncopal episodes with possible post-syncopal convulsions, not seizures)  - Pt now off Vimpat (can worsen Heart Block)  - Further AED management can be addressed by outpatient neurologist (Dr. Maldonado)  - It is important to note on d/c paperwork and in documentation that pt never had any seizures during this hositalization, and that all his events were actually syncopal events or convulsive syncope related to the Sinus Arrest and Heart Block. This is proven by vEEG recording on two occasions.

## 2018-02-23 NOTE — PROGRESS NOTE ADULT - ASSESSMENT
57 M with PMH of intellectual disability and seizure disorder BIBEMS from group home presenting w/ increased tonic clonic seizure activity, potentially precipitated by a recent viral gastroenteritis or antiepileptic medication changes, w/ course c/b CHB s/p PPM 57 M with PMH of intellectual disability and seizure disorder BIBEMS from group home presenting w/ increased tonic clonic seizure like activity, in the setting of newly diagnosed CHB s/p PPM

## 2018-02-23 NOTE — PROGRESS NOTE ADULT - ATTENDING COMMENTS
57y-old Male with intellectual disability, pachygyria, epilepsy (outpt neurologist Dr. Cal Maldonado) who presented from group home with what thought was increased sz activity. Hx obtained from chart review and pt's brother, eyad ([phone]). Onset of sz around 2-4yo, described as eye rolling backward and full body tremulousness that would last <1min. This rarely occurs, maybe several times a year. Brother calls this "small sz." Brother said there was no h/o convulsion. Home AEDs prior to admission: LCM 150mg BID, LTG 250mg BID, /1000, LEV 750mg BID.     About 10-20 yrs ago, another type of event emerged, and his brother calls this "big sz." It is described as sudden collapse, unresponsiveness, staring ahead, and lack of motor activity. Monday 2/19 evening around 6:10pm, pt had a habitual "big sz" when he suddenly collapsed into bed, motionless, stared off, and was unresponsive to tactile stimulus. This event correlated with NSR 80-90 BPM transitioning to AV block, transitioning to ventricular asystole (13s asystole -> 1 QRS complex ->14s asytole -> 1 QRS complex -> 8s asystole), transitioning back to AV block. There was no epileptiform discharge or sz seen during this event. Pt reported had 2 additional convulsions right before TVP and during TVP placement, probably convulsive syncope. For these events, pt was loaded with LEV.    Personally review all studies and labs, including CTH and EEG.  CT 1/18/18: c/w pachygyria   cvEEG: Two habitual events (what brother called "big sz") were captured, one consisted of unresponsiveness/sudden collapse and the other consisted of unresponsiveness/convulsive syncope. Neither event correlated with seizure activity, but instead, demonstrated sinus arrest on the single channel EKG.    PPM placed yesterday. Pt today much more cheerful and interactive. It would be extremely important to document in discharge note that what was thought to be increased sz activity was really syncope and convulsive syncope in setting of sinus arrest. This is a definitive diagnosis given 2 of the habitual events were captured on cvEEG.    # Epilepsy: characterized by eye rolling upward and tremulousness  - cont LTG 250mg BID, VPA 500mg q8h  - update and coordinate care with outpt neurologist Dr. Maldonado before discharge    # Sinus arrest: clinically correlated with sudden collapse, staring off, unresponsiveness, +/- convulsion (convulsive syncope, NOT GTC sz).  - s/p PPM     # Complete AV block  - s/p PPM   - LCM tapered     # Intellectual disability

## 2018-02-23 NOTE — PROGRESS NOTE ADULT - PROBLEM SELECTOR PLAN 2
- S/p PPM on 2/21.  - EP following - S/p PPM on 2/21.  -Leukocytosis likely in setting of recent procedure. Site w/o stigmata of infection. will continue to monitor

## 2018-02-23 NOTE — PROGRESS NOTE ADULT - ATTENDING COMMENTS
Patient seen and examined. 58 yo M w/ PMHx as stated above a/w increased seizure like activity w/ course c/b complete heart block which correlated w/ episode of loss of tone initially thought to be 2/2 seizure but no seizure episode was identified on EEG during that time, and likely pts symptoms 2/2 heart block.  Now s/p PPM and pt has been stable. Patient initially was on 4 AEDs at home now titrated off of keppra and vimpat.     -neuro recs appreciated.   -c/w lamictal and depakoate   -dc planning for monday once meeting with group home is arranged.

## 2018-02-23 NOTE — PROGRESS NOTE ADULT - SUBJECTIVE AND OBJECTIVE BOX
Epilepsy Consult Note    Subjective: Pt appears to be doing better since Implantable Pacemaker placement. No reported seizure or other acute events since.    Objective:   Vital Signs Last 24 Hrs  T(C): 36.7 (23 Feb 2018 05:22), Max: 37.1 (22 Feb 2018 13:05)  T(F): 98.1 (23 Feb 2018 05:22), Max: 98.8 (22 Feb 2018 13:05)  HR: 95 (23 Feb 2018 05:22) (83 - 95)  BP: 137/84 (23 Feb 2018 05:22) (129/71 - 168/98)  BP(mean): --  RR: 18 (23 Feb 2018 05:22) (17 - 18)  SpO2: 97% (23 Feb 2018 05:22) (97% - 99%)      General appearance: No acute distress, appears more alert and interactive than yesterday  Mental Status:  alert and oriented x2 (baseline), speech is limited, following commands    Cranial Nerves: PERRL, EOMI without nystagmus, visual fields intact grossly, no facial droop, no dysarthria    Motor:   Tone: normal  Strength: moving all extremities equally    Pronator drift: none b/l  Tremor: none appreciated at rest or in action    Sensation: intact grossly to light touch throughout    Deep Tendon Reflexes:  2+ throughout  Toes flexor bilaterally      02-23    131<L>  |  94<L>  |  19  ----------------------------<  91  4.4   |  24  |  0.81    Ca    8.8      23 Feb 2018 06:00  Phos  2.5     02-23  Mg     1.7     02-23      02-23    131<L>  |  94<L>  |  19  ----------------------------<  91  4.4   |  24  |  0.81    Ca    8.8      23 Feb 2018 06:00  Phos  2.5     02-23  Mg     1.7     02-23                              13.4   11.19 )-----------( 190      ( 23 Feb 2018 06:00 )             38.4     MEDICATIONS  (STANDING):  calcium carbonate 1250 mG + Vitamin D (OsCal 500 + D) 1 Tablet(s) Oral daily  diVALproex  milliGRAM(s) Oral every 8 hours  docusate sodium 100 milliGRAM(s) Oral two times a day  lamoTRIgine 250 milliGRAM(s) Oral every 12 hours  metroNIDAZOLE 0.75% Cream 1 Application(s) Topical two times a day  multivitamin 1 Tablet(s) Oral daily  polyethylene glycol 3350 17 Gram(s) Oral daily    MEDICATIONS  (PRN):  acetaminophen   Tablet. 650 milliGRAM(s) Oral every 6 hours PRN Mild Pain (1 - 3) Epilepsy Consult Note    Subjective: Pt appears to be doing better since Implantable Pacemaker placement. No reported seizure or other acute events since.    Objective:   Vital Signs Last 24 Hrs  T(C): 36.7 (23 Feb 2018 05:22), Max: 37.1 (22 Feb 2018 13:05)  T(F): 98.1 (23 Feb 2018 05:22), Max: 98.8 (22 Feb 2018 13:05)  HR: 95 (23 Feb 2018 05:22) (83 - 95)  BP: 137/84 (23 Feb 2018 05:22) (129/71 - 168/98)  BP(mean): --  RR: 18 (23 Feb 2018 05:22) (17 - 18)  SpO2: 97% (23 Feb 2018 05:22) (97% - 99%)    ROS:  Unable to obtain as patient is intellectually disabled.    GENERAL PHYSICAL EXAM:  GEN: No acute distress, appears more alert and interactive than yesterday  HEENT:  NCAT, OP clear  EYES: sclera white, conjunctiva clear, no nystagmus  NECK: supple  CV: RRR, no murmur     		  PULM: CTAB, no wheezing  GI: soft ABD, +BS, NT  EXT: peripheral pulse intact, no edema, no clubbing, no cyanosis  MSK: muscle tone and strength normal  SKIN: warm, dry, no rash or lesion on exposed skin     NEUROLOGICAL EXAM:  Mental Status:  alert and oriented x2 (baseline), speech is limited, following commands    Cranial Nerves: PERRL, EOMI without nystagmus, visual fields intact grossly, no facial droop, no dysarthria    Motor:   Tone: normal  Strength: moving all extremities equally    Pronator drift: none b/l  Tremor: none appreciated at rest or in action    Sensation: intact grossly to light touch throughout    Deep Tendon Reflexes:  2+ throughout  Toes flexor bilaterally      02-23    131<L>  |  94<L>  |  19  ----------------------------<  91  4.4   |  24  |  0.81    Ca    8.8      23 Feb 2018 06:00  Phos  2.5     02-23  Mg     1.7     02-23 02-23    131<L>  |  94<L>  |  19  ----------------------------<  91  4.4   |  24  |  0.81    Ca    8.8      23 Feb 2018 06:00  Phos  2.5     02-23  Mg     1.7     02-23                              13.4   11.19 )-----------( 190      ( 23 Feb 2018 06:00 )             38.4     MEDICATIONS  (STANDING):  calcium carbonate 1250 mG + Vitamin D (OsCal 500 + D) 1 Tablet(s) Oral daily  diVALproex  milliGRAM(s) Oral every 8 hours  docusate sodium 100 milliGRAM(s) Oral two times a day  lamoTRIgine 250 milliGRAM(s) Oral every 12 hours  metroNIDAZOLE 0.75% Cream 1 Application(s) Topical two times a day  multivitamin 1 Tablet(s) Oral daily  polyethylene glycol 3350 17 Gram(s) Oral daily    MEDICATIONS  (PRN):  acetaminophen   Tablet. 650 milliGRAM(s) Oral every 6 hours PRN Mild Pain (1 - 3)

## 2018-02-24 LAB
BUN SERPL-MCNC: 21 MG/DL — SIGNIFICANT CHANGE UP (ref 7–23)
CALCIUM SERPL-MCNC: 8.6 MG/DL — SIGNIFICANT CHANGE UP (ref 8.4–10.5)
CHLORIDE SERPL-SCNC: 96 MMOL/L — LOW (ref 98–107)
CO2 SERPL-SCNC: 25 MMOL/L — SIGNIFICANT CHANGE UP (ref 22–31)
CREAT SERPL-MCNC: 0.81 MG/DL — SIGNIFICANT CHANGE UP (ref 0.5–1.3)
GLUCOSE SERPL-MCNC: 84 MG/DL — SIGNIFICANT CHANGE UP (ref 70–99)
HCT VFR BLD CALC: 36.5 % — LOW (ref 39–50)
HGB BLD-MCNC: 12.7 G/DL — LOW (ref 13–17)
MAGNESIUM SERPL-MCNC: 1.8 MG/DL — SIGNIFICANT CHANGE UP (ref 1.6–2.6)
MCHC RBC-ENTMCNC: 33.8 PG — SIGNIFICANT CHANGE UP (ref 27–34)
MCHC RBC-ENTMCNC: 34.8 % — SIGNIFICANT CHANGE UP (ref 32–36)
MCV RBC AUTO: 97.1 FL — SIGNIFICANT CHANGE UP (ref 80–100)
NRBC # FLD: 0 — SIGNIFICANT CHANGE UP
PHOSPHATE SERPL-MCNC: 2.6 MG/DL — SIGNIFICANT CHANGE UP (ref 2.5–4.5)
PLATELET # BLD AUTO: 148 K/UL — LOW (ref 150–400)
PMV BLD: 11.2 FL — SIGNIFICANT CHANGE UP (ref 7–13)
POTASSIUM SERPL-MCNC: 4.3 MMOL/L — SIGNIFICANT CHANGE UP (ref 3.5–5.3)
POTASSIUM SERPL-SCNC: 4.3 MMOL/L — SIGNIFICANT CHANGE UP (ref 3.5–5.3)
RBC # BLD: 3.76 M/UL — LOW (ref 4.2–5.8)
RBC # FLD: 12.3 % — SIGNIFICANT CHANGE UP (ref 10.3–14.5)
SODIUM SERPL-SCNC: 132 MMOL/L — LOW (ref 135–145)
WBC # BLD: 8.85 K/UL — SIGNIFICANT CHANGE UP (ref 3.8–10.5)
WBC # FLD AUTO: 8.85 K/UL — SIGNIFICANT CHANGE UP (ref 3.8–10.5)

## 2018-02-24 PROCEDURE — 99232 SBSQ HOSP IP/OBS MODERATE 35: CPT

## 2018-02-24 RX ADMIN — METRONIDAZOLE 1 APPLICATION(S): 7.5 GEL VAGINAL at 17:44

## 2018-02-24 RX ADMIN — Medication 1 TABLET(S): at 12:38

## 2018-02-24 RX ADMIN — Medication 100 MILLIGRAM(S): at 17:44

## 2018-02-24 RX ADMIN — DIVALPROEX SODIUM 500 MILLIGRAM(S): 500 TABLET, DELAYED RELEASE ORAL at 21:16

## 2018-02-24 RX ADMIN — Medication 100 MILLIGRAM(S): at 06:24

## 2018-02-24 RX ADMIN — POLYETHYLENE GLYCOL 3350 17 GRAM(S): 17 POWDER, FOR SOLUTION ORAL at 12:38

## 2018-02-24 RX ADMIN — LAMOTRIGINE 250 MILLIGRAM(S): 25 TABLET, ORALLY DISINTEGRATING ORAL at 17:45

## 2018-02-24 RX ADMIN — LAMOTRIGINE 250 MILLIGRAM(S): 25 TABLET, ORALLY DISINTEGRATING ORAL at 06:24

## 2018-02-24 RX ADMIN — DIVALPROEX SODIUM 500 MILLIGRAM(S): 500 TABLET, DELAYED RELEASE ORAL at 06:24

## 2018-02-24 RX ADMIN — METRONIDAZOLE 1 APPLICATION(S): 7.5 GEL VAGINAL at 06:24

## 2018-02-24 RX ADMIN — DIVALPROEX SODIUM 500 MILLIGRAM(S): 500 TABLET, DELAYED RELEASE ORAL at 14:23

## 2018-02-24 NOTE — PROGRESS NOTE ADULT - PROBLEM SELECTOR PLAN 1
- Patient has longstanding history of seizures since childhood and was on 4 AED prior to admission.   - Patient now off Keppra and Vimpat because of their potential risk of contributing to heart block . Continue with current seizure regimen: Lamictal 250 Q12h and valproic acid 500 Q8h.   - Q4h neuro checks, aspiration precautions, fall precautions, seizure precautions  - Ativan 2mg IV Q 5minutes, max 2 doses, for confirmed sustained seizure activity lasting greater than 3 minutes per neurology  - F/u with outpatient neurologist Dr. Lennon

## 2018-02-24 NOTE — PROGRESS NOTE ADULT - PROBLEM SELECTOR PLAN 1
- Patient has longstanding history of seizures since childhood and was on 4 AED prior to admission.   - Patient now off Keppra and Vimpat. Continue with current seizure regimen: Lamictal 250 Q12h and valproic acid 500 Q8h.   - Q4h neuro checks, aspiration precautions, fall precautions, seizure precautions  - Ativan 2mg IV Q 5minutes, max 2 doses, for confirmed sustained seizure activity lasting greater than 3 minutes per neurology  - F/u with outpatient neurologist Dr. Lennon

## 2018-02-24 NOTE — PROGRESS NOTE ADULT - ASSESSMENT
57 M with PMH of intellectual disability and seizure disorder BIBEMS from group home presenting w/ apparent increased tonic clonic seizure like activity, found to have been syncopal episodes due to newly diagnosed CHB, now s/p PPM and clinically improved, pending placement at group home.

## 2018-02-24 NOTE — PROGRESS NOTE ADULT - ASSESSMENT
57 M with PMH of intellectual disability and seizure disorder BIBEMS from group home presenting w/ increased tonic clonic seizure like activity, in the setting of newly diagnosed CHB s/p PPM 57 M with PMH of intellectual disability and seizure disorder BIBEMS from group home presenting w/ apparent increased tonic clonic seizure like activity, ultimately thought to be 2/2 newly diagnosed CHB, now s/p PPM

## 2018-02-24 NOTE — PROGRESS NOTE ADULT - SUBJECTIVE AND OBJECTIVE BOX
For Night coverage 7pm-7am: NS- page [phone] Team1-3, page [phone] Team4 & Care Model  Sat/[place] [employer] Coverage 12pm-7pm: NS- page [phone] for Team1-4, [place]- pager forwarded to covering Resident    CONTACT INFO:  SIVA Perez MD  Internal Medicine PGY1  Pager: [phone]    MAKAYLA CAMARENA  57y  Male    Patient is a 57y old  Male who presents with a chief complaint of Seizures (20 Feb 2018 13:13)    INTERVAL HPI / OVERNIGHT EVENTS: No acute events overnight. Patient seen and evaluated at bedside. Denies SOB at rest, chest pain, palpitations, abdominal pain, nausea/vomiting, fever/chills      OBJECTIVE:  Vitals Signs (24 Hrs):  T(C): 36.8 (02-24-18 @ 06:00), Max: 37.1 (02-23-18 @ 21:00)  HR: 77 (02-24-18 @ 06:00) (77 - 100)  BP: 168/87 (02-24-18 @ 06:00) (91/62 - 168/87)  RR: 18 (02-24-18 @ 06:00) (16 - 18)  SpO2: 99% (02-24-18 @ 06:00) (98% - 100%)    PHYSICAL EXAM:  General: Comfortable, no apparent distress  HEENT: Atraumatic; EOMI, PERRLA, conjunctiva and sclera clear; no tonsillar erythema/exudates/enlargement  Neck: Supple; no JVD; thyroid normal without masses or enlargement  Chest/Lungs: Clear to auscultation B/L; no rales, rhonchi or wheezing  Heart: Regular rate and rhythm; normal S1/S2; no murmurs, rubs, or gallops  Abdomen: Soft, nontender, nondistended; bowel sounds present  Extremities: PT/DP pulses 2+ B/L; no LE edema  Skin: No rashes or lesions  Neurological: Alert and oriented to person/place/time    LABS:                        13.4   11.19 )-----------( 190      ( 23 Feb 2018 06:00 )             38.4     02-23    131<L>  |  94<L>  |  19  ----------------------------<  91  4.4   |  24  |  0.81    Ca    8.8      23 Feb 2018 06:00  Phos  2.5     02-23  Mg     1.7     02-23          CAPILLARY BLOOD GLUCOSE            RADIOLOGY & ADDITIONAL TESTS:    MEDICATIONS:  acetaminophen   Tablet. 650 milliGRAM(s) Oral every 6 hours PRN Mild Pain (1 - 3)  calcium carbonate 1250 mG + Vitamin D (OsCal 500 + D) 1 Tablet(s) Oral daily  diVALproex  milliGRAM(s) Oral every 8 hours  docusate sodium 100 milliGRAM(s) Oral two times a day  lamoTRIgine 250 milliGRAM(s) Oral every 12 hours  metroNIDAZOLE 0.75% Cream 1 Application(s) Topical two times a day  multivitamin 1 Tablet(s) Oral daily  polyethylene glycol 3350 17 Gram(s) Oral daily      ALLERGIES:  caffeine (Unknown)  chocolate (Unknown)  No Known Drug Allergies      Labs and imaging personally reviewed and interpreted [  ]  Consult notes reviewed   Case discussed with consultants

## 2018-02-24 NOTE — PROGRESS NOTE ADULT - PROBLEM SELECTOR PLAN 2
- S/p PPM on 2/21.  -Leukocytosis likely in setting of recent procedure. Site w/o stigmata of infection. will continue to monitor

## 2018-02-25 PROCEDURE — 93010 ELECTROCARDIOGRAM REPORT: CPT

## 2018-02-25 PROCEDURE — 99232 SBSQ HOSP IP/OBS MODERATE 35: CPT

## 2018-02-25 RX ADMIN — LAMOTRIGINE 250 MILLIGRAM(S): 25 TABLET, ORALLY DISINTEGRATING ORAL at 17:50

## 2018-02-25 RX ADMIN — METRONIDAZOLE 1 APPLICATION(S): 7.5 GEL VAGINAL at 17:50

## 2018-02-25 RX ADMIN — Medication 100 MILLIGRAM(S): at 17:50

## 2018-02-25 RX ADMIN — POLYETHYLENE GLYCOL 3350 17 GRAM(S): 17 POWDER, FOR SOLUTION ORAL at 12:38

## 2018-02-25 RX ADMIN — LAMOTRIGINE 250 MILLIGRAM(S): 25 TABLET, ORALLY DISINTEGRATING ORAL at 06:31

## 2018-02-25 RX ADMIN — METRONIDAZOLE 1 APPLICATION(S): 7.5 GEL VAGINAL at 06:31

## 2018-02-25 RX ADMIN — DIVALPROEX SODIUM 500 MILLIGRAM(S): 500 TABLET, DELAYED RELEASE ORAL at 14:55

## 2018-02-25 RX ADMIN — Medication 1 TABLET(S): at 12:07

## 2018-02-25 RX ADMIN — DIVALPROEX SODIUM 500 MILLIGRAM(S): 500 TABLET, DELAYED RELEASE ORAL at 21:47

## 2018-02-25 RX ADMIN — DIVALPROEX SODIUM 500 MILLIGRAM(S): 500 TABLET, DELAYED RELEASE ORAL at 06:31

## 2018-02-25 RX ADMIN — Medication 100 MILLIGRAM(S): at 06:31

## 2018-02-25 NOTE — PROGRESS NOTE ADULT - ASSESSMENT
57 M with PMH of intellectual disability and seizure disorder BIBEMS from group home presenting w/ apparent increased tonic clonic seizure like activity, found to actually be syncopal episodes due to newly diagnosed complete heart block s/p PPM , and pending discharge to group home.

## 2018-02-25 NOTE — PROVIDER CONTACT NOTE (OTHER) - ASSESSMENT
Patient denies chest pain, shortness of breath and palpitations.
Pt with tonic clonic seizure at 610 pm. Witnessed by the family and on EEG. MD Diego and rapid response called. patient stable other than HR in the 30's. EKG showed av block. patient transferred to CCU due to this.

## 2018-02-25 NOTE — PROGRESS NOTE ADULT - SUBJECTIVE AND OBJECTIVE BOX
CHIEF COMPLAINT:    Interval Events:  Patient denies any chest pain, SOB, abdominal pain, cough, fever or chills.     OBJECTIVE:   Vital Signs Last 24 Hrs  T(C): 36.7 (25 Feb 2018 10:00), Max: 36.9 (24 Feb 2018 21:00)  T(F): 98 (25 Feb 2018 10:00), Max: 98.4 (24 Feb 2018 21:00)  HR: 87 (25 Feb 2018 10:00) (80 - 97)  BP: 119/70 (25 Feb 2018 10:00) (119/70 - 167/98)  BP(mean): --  ABP: --  ABP(mean): --  RR: 18 (25 Feb 2018 10:00) (16 - 18)  SpO2: 98% (25 Feb 2018 10:00) (95% - 98%)        02-24 @ 07:01  -  02-25 @ 07:00  --------------------------------------------------------  IN: 1510 mL / OUT: 1195 mL / NET: 315 mL      CAPILLARY BLOOD GLUCOSE    PHYSICAL EXAM:  General: Comfortable, no apparent distress  HEENT: Atraumatic; EOMI, PERRLA, conjunctiva and sclera clear; moist mucous membranes  Neck: Supple; no cervical lymphadenopathy  Chest/Lungs: Clear to auscultation B/L; no rales, rhonchi or wheezing, PPM incision site covered with steri strips  Heart: Regular rate and rhythm; normal S1/S2; no murmurs, rubs, or gallops  Abdomen: Soft, nontender, nondistended; bowel sounds present  Extremities: PT/DP pulses 2+ B/L; no LE edema  Skin: No rashes or lesions  Neurological: alert, awake, responding appropriately to questions    HOSPITAL MEDICATIONS:  acetaminophen   Tablet. 650 milliGRAM(s) Oral every 6 hours PRN  diVALproex  milliGRAM(s) Oral every 8 hours  lamoTRIgine 250 milliGRAM(s) Oral every 12 hours    docusate sodium 100 milliGRAM(s) Oral two times a day  polyethylene glycol 3350 17 Gram(s) Oral daily        calcium carbonate 1250 mG + Vitamin D (OsCal 500 + D) 1 Tablet(s) Oral daily  multivitamin 1 Tablet(s) Oral daily      metroNIDAZOLE 0.75% Cream 1 Application(s) Topical two times a day        LABS:                        12.7   8.85  )-----------( 148      ( 24 Feb 2018 07:35 )             36.5     Hgb Trend: 12.7<--, 13.4<--, 13.6<--, 12.7<--, 12.9<--  02-24    132<L>  |  96<L>  |  21  ----------------------------<  84  4.3   |  25  |  0.81    Ca    8.6      24 Feb 2018 07:35  Phos  2.6     02-24  Mg     1.8     02-24      Creatinine Trend: 0.81<--, 0.81<--, 0.84<--, 1.09<--, 0.84<--, 1.14<--      Tele- [place] [id number] Covered by Team 1 Medicine, pager [phone]  Covered by cross coverage after 12PM on weekends  Covered by night float after 7pm pager [phone]    CHIEF COMPLAINT: seizures    Interval Events:  Patient denies any chest pain, SOB, abdominal pain, cough, fever or chills.     OBJECTIVE:   Vital Signs Last 24 Hrs  T(C): 36.7 (25 Feb 2018 10:00), Max: 36.9 (24 Feb 2018 21:00)  T(F): 98 (25 Feb 2018 10:00), Max: 98.4 (24 Feb 2018 21:00)  HR: 87 (25 Feb 2018 10:00) (80 - 97)  BP: 119/70 (25 Feb 2018 10:00) (119/70 - 167/98)  BP(mean): --  ABP: --  ABP(mean): --  RR: 18 (25 Feb 2018 10:00) (16 - 18)  SpO2: 98% (25 Feb 2018 10:00) (95% - 98%)        02-24 @ 07:01  -  02-25 @ 07:00  --------------------------------------------------------  IN: 1510 mL / OUT: 1195 mL / NET: 315 mL      CAPILLARY BLOOD GLUCOSE    PHYSICAL EXAM:  General: Comfortable, no apparent distress  HEENT: Atraumatic; EOMI, PERRLA, conjunctiva and sclera clear; moist mucous membranes  Neck: Supple; no cervical lymphadenopathy  Chest/Lungs: Clear to auscultation B/L; no rales, rhonchi or wheezing, PPM incision site covered with steri strips  Heart: Regular rate and rhythm; normal S1/S2; no murmurs, rubs, or gallops  Abdomen: Soft, nontender, nondistended; bowel sounds present  Extremities: PT/DP pulses 2+ B/L; no LE edema  Skin: No rashes or lesions  Neurological: alert, awake, responding appropriately to questions    HOSPITAL MEDICATIONS:  acetaminophen   Tablet. 650 milliGRAM(s) Oral every 6 hours PRN  diVALproex  milliGRAM(s) Oral every 8 hours  lamoTRIgine 250 milliGRAM(s) Oral every 12 hours    docusate sodium 100 milliGRAM(s) Oral two times a day  polyethylene glycol 3350 17 Gram(s) Oral daily        calcium carbonate 1250 mG + Vitamin D (OsCal 500 + D) 1 Tablet(s) Oral daily  multivitamin 1 Tablet(s) Oral daily      metroNIDAZOLE 0.75% Cream 1 Application(s) Topical two times a day        LABS:                        12.7   8.85  )-----------( 148      ( 24 Feb 2018 07:35 )             36.5     Hgb Trend: 12.7<--, 13.4<--, 13.6<--, 12.7<--, 12.9<--  02-24    132<L>  |  96<L>  |  21  ----------------------------<  84  4.3   |  25  |  0.81    Ca    8.6      24 Feb 2018 07:35  Phos  2.6     02-24  Mg     1.8     02-24      Creatinine Trend: 0.81<--, 0.81<--, 0.84<--, 1.09<--, 0.84<--, 1.14<--      Tele- V paced 84

## 2018-02-26 PROCEDURE — 99233 SBSQ HOSP IP/OBS HIGH 50: CPT | Mod: GC

## 2018-02-26 RX ORDER — DIVALPROEX SODIUM 500 MG/1
1 TABLET, DELAYED RELEASE ORAL
Qty: 0 | Refills: 0 | COMMUNITY

## 2018-02-26 RX ORDER — DIVALPROEX SODIUM 500 MG/1
1 TABLET, DELAYED RELEASE ORAL
Qty: 0 | Refills: 0 | COMMUNITY
Start: 2018-02-26

## 2018-02-26 RX ORDER — LACOSAMIDE 50 MG/1
1 TABLET ORAL
Qty: 0 | Refills: 0 | COMMUNITY

## 2018-02-26 RX ORDER — SODIUM CHLORIDE 9 MG/ML
1000 INJECTION INTRAMUSCULAR; INTRAVENOUS; SUBCUTANEOUS
Qty: 0 | Refills: 0 | Status: DISCONTINUED | OUTPATIENT
Start: 2018-02-26 | End: 2018-02-26

## 2018-02-26 RX ADMIN — METRONIDAZOLE 1 APPLICATION(S): 7.5 GEL VAGINAL at 05:15

## 2018-02-26 RX ADMIN — DIVALPROEX SODIUM 500 MILLIGRAM(S): 500 TABLET, DELAYED RELEASE ORAL at 21:46

## 2018-02-26 RX ADMIN — LAMOTRIGINE 250 MILLIGRAM(S): 25 TABLET, ORALLY DISINTEGRATING ORAL at 17:21

## 2018-02-26 RX ADMIN — Medication 1 TABLET(S): at 11:24

## 2018-02-26 RX ADMIN — METRONIDAZOLE 1 APPLICATION(S): 7.5 GEL VAGINAL at 17:20

## 2018-02-26 RX ADMIN — Medication 100 MILLIGRAM(S): at 17:20

## 2018-02-26 RX ADMIN — DIVALPROEX SODIUM 500 MILLIGRAM(S): 500 TABLET, DELAYED RELEASE ORAL at 05:14

## 2018-02-26 RX ADMIN — DIVALPROEX SODIUM 500 MILLIGRAM(S): 500 TABLET, DELAYED RELEASE ORAL at 13:45

## 2018-02-26 RX ADMIN — POLYETHYLENE GLYCOL 3350 17 GRAM(S): 17 POWDER, FOR SOLUTION ORAL at 11:25

## 2018-02-26 RX ADMIN — Medication 100 MILLIGRAM(S): at 05:15

## 2018-02-26 RX ADMIN — LAMOTRIGINE 250 MILLIGRAM(S): 25 TABLET, ORALLY DISINTEGRATING ORAL at 05:14

## 2018-02-26 RX ADMIN — Medication 1 TABLET(S): at 11:25

## 2018-02-26 NOTE — PROGRESS NOTE ADULT - SUBJECTIVE AND OBJECTIVE BOX
Patient seen and examined at bedside.    Overnight Events: GENA. PPM working well.    Review Of Systems: No chest pain, shortness of breath, or palpitations            Medications:  acetaminophen   Tablet. 650 milliGRAM(s) Oral every 6 hours PRN  calcium carbonate 1250 mG + Vitamin D (OsCal 500 + D) 1 Tablet(s) Oral daily  diVALproex  milliGRAM(s) Oral every 8 hours  docusate sodium 100 milliGRAM(s) Oral two times a day  lamoTRIgine 250 milliGRAM(s) Oral every 12 hours  metroNIDAZOLE 0.75% Cream 1 Application(s) Topical two times a day  multivitamin 1 Tablet(s) Oral daily  polyethylene glycol 3350 17 Gram(s) Oral daily      PAST MEDICAL & SURGICAL HISTORY:  Hyponatremia  External hemorrhoids: марина/erica Aug, 2013  Mental retardation  Hypertension  Seizure  No significant past surgical history      Vitals:  T(F): 98.1 (02-26), Max: 98.2 (02-25)  HR: 94 (02-26) (81 - 113)  BP: 155/93 (02-26) (129/76 - 162/95)  RR: 16 (02-26)  SpO2: 98% (02-26)  I&O's Summary    25 Feb 2018 07:01  -  26 Feb 2018 07:00  --------------------------------------------------------  IN: 1127 mL / OUT: 1100 mL / NET: 27 mL        Physical Exam:  Gen- well developed NAD. Cooperative  Resp- clear to ausculation. No wheezing, rales or rhonchi  CV- Normal S1 and S2. RRR. No murmurs, gallops or rubs. Left chest PPM site without bleeding, hematoma or pain. Steristrips intact.    ABD- not examined.  Sitting in chair.   EXT- no edema.   Neuro-grossly nonfocal    Interpretation of Telemetry:  , HR 90      A/P:  57M with intellectual disability and seizure disorder found to have complete heart block in setting of seizure.    Complete heart block. In setting of seizure. S/p PPM 2/21.  - PPM functioning well    Nate Arroyo MD  Cardiology Fellow [phone]

## 2018-02-26 NOTE — PROGRESS NOTE ADULT - SUBJECTIVE AND OBJECTIVE BOX
Patient is a 57y old  Male who presents with a chief complaint of Seizures (20 Feb 2018 13:13)      SUBJECTIVE / OVERNIGHT EVENTS:  Patient seen and examined at bedside. No acute events overnight.     MEDICATIONS  (STANDING):  calcium carbonate 1250 mG + Vitamin D (OsCal 500 + D) 1 Tablet(s) Oral daily  diVALproex  milliGRAM(s) Oral every 8 hours  docusate sodium 100 milliGRAM(s) Oral two times a day  lamoTRIgine 250 milliGRAM(s) Oral every 12 hours  metroNIDAZOLE 0.75% Cream 1 Application(s) Topical two times a day  multivitamin 1 Tablet(s) Oral daily  polyethylene glycol 3350 17 Gram(s) Oral daily    MEDICATIONS  (PRN):  acetaminophen   Tablet. 650 milliGRAM(s) Oral every 6 hours PRN Mild Pain (1 - 3)      Vital Signs Last 24 Hrs  T(C): 36.2 (26 Feb 2018 05:12), Max: 36.8 (25 Feb 2018 12:24)  T(F): 97.2 (26 Feb 2018 05:12), Max: 98.2 (25 Feb 2018 12:24)  HR: 98 (26 Feb 2018 05:12) (81 - 113)  BP: 162/95 (26 Feb 2018 05:12) (119/70 - 162/95)  BP(mean): --  RR: 17 (26 Feb 2018 05:12) (17 - 18)  SpO2: 97% (26 Feb 2018 05:12) (97% - 98%)  CAPILLARY BLOOD GLUCOSE        I&O's Summary    25 Feb 2018 07:01  -  26 Feb 2018 07:00  --------------------------------------------------------  IN: 1127 mL / OUT: 1100 mL / NET: 27 mL        PHYSICAL EXAM:  GENERAL: NAD, well-developed  HEAD:  Atraumatic, Normocephalic  EYES: EOMI,  conjunctiva and sclera clear  NECK: Supple  CHEST/LUNG: Clear to auscultation bilaterally; No wheeze  HEART: Regular rate and rhythm; No murmurs, rubs, or gallops  ABDOMEN: Soft, Nontender, Nondistended; Bowel sounds present  EXTREMITIES:  No clubbing, cyanosis, or edema  PSYCH: AAOx3  NEUROLOGY: non-focal  SKIN: No rashes or lesions    LABS:    WBC Trend: 8.85<--, 11.19<--, 11.86<--        Creatinine Trend: 0.81<--, 0.81<--, 0.84<--, 1.09<--, 0.84<--, 1.14<--              RADIOLOGY & ADDITIONAL TESTS:    Imaging Personally Reviewed:    Consultant(s) Notes Reviewed:      Care Discussed with Consultants/Other Providers: Patient is a 57y old  Male who presents with a chief complaint of Seizures (20 Feb 2018 13:13)      SUBJECTIVE / OVERNIGHT EVENTS:  Patient seen and examined at bedside. No acute events overnight. This morning patient stated that he felt fine. Denied any CP, SOB, abd pain. Denied any N, V or seizure activity. Patient reports that he is urinating well and having bowel movements without issues.     MEDICATIONS  (STANDING):  calcium carbonate 1250 mG + Vitamin D (OsCal 500 + D) 1 Tablet(s) Oral daily  diVALproex  milliGRAM(s) Oral every 8 hours  docusate sodium 100 milliGRAM(s) Oral two times a day  lamoTRIgine 250 milliGRAM(s) Oral every 12 hours  metroNIDAZOLE 0.75% Cream 1 Application(s) Topical two times a day  multivitamin 1 Tablet(s) Oral daily  polyethylene glycol 3350 17 Gram(s) Oral daily    MEDICATIONS  (PRN):  acetaminophen   Tablet. 650 milliGRAM(s) Oral every 6 hours PRN Mild Pain (1 - 3)      Vital Signs Last 24 Hrs  T(C): 36.2 (26 Feb 2018 05:12), Max: 36.8 (25 Feb 2018 12:24)  T(F): 97.2 (26 Feb 2018 05:12), Max: 98.2 (25 Feb 2018 12:24)  HR: 98 (26 Feb 2018 05:12) (81 - 113)  BP: 162/95 (26 Feb 2018 05:12) (119/70 - 162/95)  BP(mean): --  RR: 17 (26 Feb 2018 05:12) (17 - 18)  SpO2: 97% (26 Feb 2018 05:12) (97% - 98%)  CAPILLARY BLOOD GLUCOSE        I&O's Summary    25 Feb 2018 07:01  -  26 Feb 2018 07:00  --------------------------------------------------------  IN: 1127 mL / OUT: 1100 mL / NET: 27 mL        PHYSICAL EXAM:  tele: sinus 89, V paced no events overnight  General: Comfortable, no apparent distress  HEENT: Atraumatic; EOMI, PERRLA, conjunctiva and sclera clear; moist mucous membranes  Neck: Supple; no cervical lymphadenopathy  Chest/Lungs: Clear to auscultation bilaterally. PPM incision site covered with steri strips, intact  Heart: Regular rate and rhythm; normal S1/S2; no murmurs, rubs, or gallops  Abdomen: Soft, nontender, nondistended; bowel sounds present  Extremities: pulses 2+ B/L; no LE edema  Skin: No rashes or lesions  Neurological: alert, awake, responding appropriately to question  LABS:    WBC Trend: 8.85<--, 11.19<--, 11.86<--        Creatinine Trend: 0.81<--, 0.81<--, 0.84<--, 1.09<--, 0.84<--, 1.14<--              RADIOLOGY & ADDITIONAL TESTS:    Imaging Personally Reviewed:x    Consultant(s) Notes Reviewed:  x

## 2018-02-26 NOTE — PROGRESS NOTE ADULT - ATTENDING COMMENTS
Pt is alert and mental status at baseline. Vitals stable. Medically stable for discharge back to Group home.  Total time spend in coordination of discharge: 35Min.

## 2018-02-26 NOTE — PROGRESS NOTE ADULT - PROBLEM SELECTOR PLAN 2
- S/p PPM on 2/21.  -Leukocytosis likely in setting of recent procedure. Site w/o stigmata of infection. will continue to monitor - S/p PPM on 2/21.  -no events on tele

## 2018-02-27 PROCEDURE — 99233 SBSQ HOSP IP/OBS HIGH 50: CPT | Mod: GC

## 2018-02-27 RX ADMIN — DIVALPROEX SODIUM 500 MILLIGRAM(S): 500 TABLET, DELAYED RELEASE ORAL at 21:38

## 2018-02-27 RX ADMIN — DIVALPROEX SODIUM 500 MILLIGRAM(S): 500 TABLET, DELAYED RELEASE ORAL at 13:13

## 2018-02-27 RX ADMIN — METRONIDAZOLE 1 APPLICATION(S): 7.5 GEL VAGINAL at 17:22

## 2018-02-27 RX ADMIN — METRONIDAZOLE 1 APPLICATION(S): 7.5 GEL VAGINAL at 05:06

## 2018-02-27 RX ADMIN — Medication 1 TABLET(S): at 11:37

## 2018-02-27 RX ADMIN — Medication 100 MILLIGRAM(S): at 05:06

## 2018-02-27 RX ADMIN — LAMOTRIGINE 250 MILLIGRAM(S): 25 TABLET, ORALLY DISINTEGRATING ORAL at 05:06

## 2018-02-27 RX ADMIN — LAMOTRIGINE 250 MILLIGRAM(S): 25 TABLET, ORALLY DISINTEGRATING ORAL at 17:22

## 2018-02-27 RX ADMIN — DIVALPROEX SODIUM 500 MILLIGRAM(S): 500 TABLET, DELAYED RELEASE ORAL at 05:06

## 2018-02-27 NOTE — PROGRESS NOTE ADULT - PROBLEM SELECTOR PLAN 1
- Patient has longstanding history of seizures since childhood and was on 4 AED prior to admission.   - Patient now off Keppra and Vimpat. Continue with current seizure regimen: Lamictal 250 Q12h and valproic acid 500 Q8h.   - Q4h neuro checks, aspiration precautions, fall precautions, seizure precautions  - Ativan 2mg IV Q 5minutes, max 2 doses, for confirmed sustained seizure activity lasting greater than 3 minutes per neurology  - F/u with outpatient neurologist Dr. Lennon - Patient has longstanding history of seizures since childhood and was on 4 AED prior to admission.   - Patient now off Keppra and Vimpat. Continue with current seizure regimen: Lamictal 250 Q12h and valproic acid 500 Q8h.  Will d/c on this regimen.  - Q4h neuro checks, aspiration precautions, fall precautions, seizure precautions  - Ativan 2mg IV Q 5minutes, max 2 doses, for confirmed sustained seizure activity lasting greater than 3 minutes per neurology  - F/u with outpatient neurologist Dr. Lennon

## 2018-02-27 NOTE — PROGRESS NOTE ADULT - SUBJECTIVE AND OBJECTIVE BOX
Patient is a 57y old  Male who presents with a chief complaint of Seizures (20 Feb 2018 13:13)      SUBJECTIVE / OVERNIGHT EVENTS:  Patient seen and examined at bedside. No acute events overnight.     MEDICATIONS  (STANDING):  calcium carbonate 1250 mG + Vitamin D (OsCal 500 + D) 1 Tablet(s) Oral daily  diVALproex  milliGRAM(s) Oral every 8 hours  docusate sodium 100 milliGRAM(s) Oral two times a day  lamoTRIgine 250 milliGRAM(s) Oral every 12 hours  metroNIDAZOLE 0.75% Cream 1 Application(s) Topical two times a day  multivitamin 1 Tablet(s) Oral daily  polyethylene glycol 3350 17 Gram(s) Oral daily    MEDICATIONS  (PRN):  acetaminophen   Tablet. 650 milliGRAM(s) Oral every 6 hours PRN Mild Pain (1 - 3)      Vital Signs Last 24 Hrs  T(C): 36.6 (27 Feb 2018 05:05), Max: 36.8 (26 Feb 2018 21:45)  T(F): 97.9 (27 Feb 2018 05:05), Max: 98.2 (26 Feb 2018 21:45)  HR: 79 (27 Feb 2018 05:05) (79 - 94)  BP: 150/87 (27 Feb 2018 05:05) (101/73 - 158/85)  BP(mean): --  RR: 17 (27 Feb 2018 05:05) (16 - 18)  SpO2: 99% (27 Feb 2018 05:05) (98% - 99%)  CAPILLARY BLOOD GLUCOSE        I&O's Summary    26 Feb 2018 07:01  -  27 Feb 2018 07:00  --------------------------------------------------------  IN: 1014 mL / OUT: 400 mL / NET: 614 mL        PHYSICAL EXAM:  GENERAL: NAD, well-developed  HEAD:  Atraumatic, Normocephalic  EYES: EOMI,  conjunctiva and sclera clear  NECK: Supple  CHEST/LUNG: Clear to auscultation bilaterally; No wheeze  HEART: Regular rate and rhythm; No murmurs, rubs, or gallops  ABDOMEN: Soft, Nontender, Nondistended; Bowel sounds present  EXTREMITIES:  No clubbing, cyanosis, or edema  PSYCH: AAOx3  NEUROLOGY: non-focal  SKIN: No rashes or lesions    LABS:    WBC Trend: 8.85<--, 11.19<--, 11.86<--        Creatinine Trend: 0.81<--, 0.81<--, 0.84<--, 1.09<--, 0.84<--, 1.14<--              RADIOLOGY & ADDITIONAL TESTS:    Imaging Personally Reviewed:    Consultant(s) Notes Reviewed:      Care Discussed with Consultants/Other Providers: Patient is a 57y old  Male who presents with a chief complaint of Seizures (20 Feb 2018 13:13)      SUBJECTIVE / OVERNIGHT EVENTS:  Patient seen and examined at bedside. No acute events overnight. Patient endorses doing well. Denies CP, palpitations, dizziness, SOB, Abd and reports no issues with moving his bowels or urination. On tele patient was V-paced 76, no overnight events.     MEDICATIONS  (STANDING):  calcium carbonate 1250 mG + Vitamin D (OsCal 500 + D) 1 Tablet(s) Oral daily  diVALproex  milliGRAM(s) Oral every 8 hours  docusate sodium 100 milliGRAM(s) Oral two times a day  lamoTRIgine 250 milliGRAM(s) Oral every 12 hours  metroNIDAZOLE 0.75% Cream 1 Application(s) Topical two times a day  multivitamin 1 Tablet(s) Oral daily  polyethylene glycol 3350 17 Gram(s) Oral daily    MEDICATIONS  (PRN):  acetaminophen   Tablet. 650 milliGRAM(s) Oral every 6 hours PRN Mild Pain (1 - 3)      Vital Signs Last 24 Hrs  T(C): 36.6 (27 Feb 2018 05:05), Max: 36.8 (26 Feb 2018 21:45)  T(F): 97.9 (27 Feb 2018 05:05), Max: 98.2 (26 Feb 2018 21:45)  HR: 79 (27 Feb 2018 05:05) (79 - 94)  BP: 150/87 (27 Feb 2018 05:05) (101/73 - 158/85)  BP(mean): --  RR: 17 (27 Feb 2018 05:05) (16 - 18)  SpO2: 99% (27 Feb 2018 05:05) (98% - 99%)  CAPILLARY BLOOD GLUCOSE        I&O's Summary    26 Feb 2018 07:01  -  27 Feb 2018 07:00  --------------------------------------------------------  IN: 1014 mL / OUT: 400 mL / NET: 614 mL        PHYSICAL EXAM:  tele: sinus 76, V paced no events overnight  General: Comfortable, alert   HEENT: Atraumatic; EOMI, PERRLA, conjunctiva and sclera clear; moist mucous membranes  Neck: Supple; no cervical lymphadenopathy  Chest/Lungs: Clear to auscultation bilaterally. PPM incision site covered with steri strips, intact  Heart: Regular rate and rhythm; normal S1/S2; no murmurs, rubs, or gallops  Abdomen: Soft, nontender, nondistended; bowel sounds present  Extremities: pulses 2+ B/L; no LE edema  Skin: No rashes or lesions  Neurological: alert, awake, responding appropriately to question    LABS:    WBC Trend: 8.85<--, 11.19<--, 11.86<--        Creatinine Trend: 0.81<--, 0.81<--, 0.84<--, 1.09<--, 0.84<--, 1.14<--              RADIOLOGY & ADDITIONAL TESTS:    Imaging Personally Reviewed: x    Consultant(s) Notes Reviewed: x    Care Discussed with Consultants/Other Providers:

## 2018-02-28 LAB — LEVETIRACETAM SERPL-MCNC: SIGNIFICANT CHANGE UP

## 2018-02-28 PROCEDURE — 99233 SBSQ HOSP IP/OBS HIGH 50: CPT | Mod: GC

## 2018-02-28 RX ORDER — LISINOPRIL 2.5 MG/1
10 TABLET ORAL DAILY
Qty: 0 | Refills: 0 | Status: DISCONTINUED | OUTPATIENT
Start: 2018-02-28 | End: 2018-03-01

## 2018-02-28 RX ORDER — LISINOPRIL 2.5 MG/1
1 TABLET ORAL
Qty: 0 | Refills: 0 | COMMUNITY
Start: 2018-02-28

## 2018-02-28 RX ADMIN — Medication 100 MILLIGRAM(S): at 17:24

## 2018-02-28 RX ADMIN — DIVALPROEX SODIUM 500 MILLIGRAM(S): 500 TABLET, DELAYED RELEASE ORAL at 05:27

## 2018-02-28 RX ADMIN — METRONIDAZOLE 1 APPLICATION(S): 7.5 GEL VAGINAL at 17:25

## 2018-02-28 RX ADMIN — Medication 1 TABLET(S): at 13:12

## 2018-02-28 RX ADMIN — DIVALPROEX SODIUM 500 MILLIGRAM(S): 500 TABLET, DELAYED RELEASE ORAL at 13:12

## 2018-02-28 RX ADMIN — DIVALPROEX SODIUM 500 MILLIGRAM(S): 500 TABLET, DELAYED RELEASE ORAL at 22:01

## 2018-02-28 RX ADMIN — LAMOTRIGINE 250 MILLIGRAM(S): 25 TABLET, ORALLY DISINTEGRATING ORAL at 17:25

## 2018-02-28 RX ADMIN — METRONIDAZOLE 1 APPLICATION(S): 7.5 GEL VAGINAL at 05:27

## 2018-02-28 RX ADMIN — LAMOTRIGINE 250 MILLIGRAM(S): 25 TABLET, ORALLY DISINTEGRATING ORAL at 05:27

## 2018-02-28 NOTE — DIETITIAN INITIAL EVALUATION ADULT. - SOURCE
Pt's brother @ bed side answered questions during interview, Nurse, Medical Chart/family/significant other/other (specify)

## 2018-02-28 NOTE — PROGRESS NOTE ADULT - PROBLEM SELECTOR PLAN 1
- Patient has longstanding history of seizures since childhood and was on 4 AED prior to admission.   - Patient now off Keppra and Vimpat. Continue with current seizure regimen: Lamictal 250 Q12h and valproic acid 500 Q8h.  Will d/c on this regimen.  - Q4h neuro checks, aspiration precautions, fall precautions, seizure precautions  - Ativan 2mg IV Q 5minutes, max 2 doses, for confirmed sustained seizure activity lasting greater than 3 minutes per neurology  - F/u with outpatient neurologist Dr. Lennon - Patient has longstanding history of seizures since childhood and was on 4 AED prior to admission.   -Pt found to have complete heartblock S/P pacemaker placement  - Patient now off Keppra and Vimpat. Continue with current seizure regimen: Lamictal 250 Q12h and valproic acid 500 Q8h.  Will d/c on this regimen.  - Q4h neuro checks, aspiration precautions, fall precautions, seizure precautions  - Ativan 2mg IV Q 5minutes, max 2 doses, for confirmed sustained seizure activity lasting greater than 3 minutes per neurology  - F/u with outpatient neurologist Dr. Lennon

## 2018-02-28 NOTE — PROGRESS NOTE ADULT - ATTENDING COMMENTS
Medical Attending Advance Care Planning Meeting Note:  Met with pt's brother and Group home staff, SW, PT, RN for 20 min together with HS team about ACP and disposition;  Pt's condition updated to brother and group home. F/U issues such as fall precaution, Physical therapy, EPS f/u, Seizure precaution discussed during the meeting.  Group home staff wished to have PT to re-evaluate pt to make sure pt can climb 14 steps of stairs before accepting pt back to group home. Met with pt's brother and Group home staff, CHARLEY, PT, RN for 20 min together with HS team about  disposition;  Pt's condition updated to brother and group home. F/U issues such as fall precaution, Physical therapy, EPS f/u, Seizure precaution discussed during the meeting.  Group home staff wished to have PT to re-evaluate pt to make sure pt can climb 14 steps of stairs before accepting pt back to group home.

## 2018-02-28 NOTE — DIETITIAN INITIAL EVALUATION ADULT. - NS AS NUTRI INTERV MEALS SNACK
1. Encourage & assist Pt with meals as needed; Monitor PO diet tolerance;           2. Monitor labs, weights, hydration status;/Texture-modified diet/Other (specify)

## 2018-02-28 NOTE — PROGRESS NOTE ADULT - PROBLEM SELECTOR PLAN 4
HSQ    - Dispo: Discharge planning to group home HSQ    - Dispo: Discharge planning to group home, pending PT eval with stairs (needs to be able to ambulate up 14 stairs)

## 2018-02-28 NOTE — DIETITIAN INITIAL EVALUATION ADULT. - PT NOT SOURCE
Pt with PMH of intellectual disability and long standing hx of seizure disorder, Pt with Mental retardation/other (specify)

## 2018-02-28 NOTE — DIETITIAN INITIAL EVALUATION ADULT. - OTHER INFO
Pt seen for Length of stay. Pt 56 yo Pt with PMH of intellectual disability and long standing hx of seizure disorder. Of note Pt with Mental retardation as well. Pt A/A O X 2-3 reported. Pt’s brother (also Pt’s guardian) @ bed side answered questions during interview. Per brother Pt’s appetite usually good; no difficulty to chew/swallow food, but Pt needs help in cutting food items. Pt usually eats Regular food reported. At time of visit Pt appears thin. Per brother Pt always been thin. No recent weight loss or weight changes reported. No other food related concerns voiced @ this time. RDN remains available, Pt’s brother made aware. Case discussed with Nurse.

## 2018-02-28 NOTE — PROGRESS NOTE ADULT - SUBJECTIVE AND OBJECTIVE BOX
Patient is a 57y old  Male who presents with a chief complaint of Seizures (20 Feb 2018 13:13)      SUBJECTIVE / OVERNIGHT EVENTS:  Patient seen and examined at bedside. No acute events overnight.     MEDICATIONS  (STANDING):  calcium carbonate 1250 mG + Vitamin D (OsCal 500 + D) 1 Tablet(s) Oral daily  diVALproex  milliGRAM(s) Oral every 8 hours  docusate sodium 100 milliGRAM(s) Oral two times a day  lamoTRIgine 250 milliGRAM(s) Oral every 12 hours  metroNIDAZOLE 0.75% Cream 1 Application(s) Topical two times a day  multivitamin 1 Tablet(s) Oral daily  polyethylene glycol 3350 17 Gram(s) Oral daily    MEDICATIONS  (PRN):  acetaminophen   Tablet. 650 milliGRAM(s) Oral every 6 hours PRN Mild Pain (1 - 3)      Vital Signs Last 24 Hrs  T(C): 36.3 (28 Feb 2018 05:25), Max: 37.1 (27 Feb 2018 21:36)  T(F): 97.4 (28 Feb 2018 05:25), Max: 98.7 (27 Feb 2018 21:36)  HR: 78 (28 Feb 2018 08:32) (75 - 98)  BP: 178/98 (28 Feb 2018 08:32) (125/75 - 178/98)  BP(mean): --  RR: 16 (28 Feb 2018 08:32) (16 - 17)  SpO2: 98% (28 Feb 2018 08:32) (97% - 99%)  CAPILLARY BLOOD GLUCOSE        I&O's Summary    27 Feb 2018 07:01  -  28 Feb 2018 07:00  --------------------------------------------------------  IN: 918 mL / OUT: 0 mL / NET: 918 mL        PHYSICAL EXAM:  GENERAL: NAD, well-developed  HEAD:  Atraumatic, Normocephalic  EYES: EOMI,  conjunctiva and sclera clear  NECK: Supple  CHEST/LUNG: Clear to auscultation bilaterally; No wheeze  HEART: Regular rate and rhythm; No murmurs, rubs, or gallops  ABDOMEN: Soft, Nontender, Nondistended; Bowel sounds present  EXTREMITIES:  No clubbing, cyanosis, or edema  PSYCH: AAOx3  NEUROLOGY: non-focal  SKIN: No rashes or lesions    LABS:    WBC Trend: 8.85<--, 11.19<--, 11.86<--        Creatinine Trend: 0.81<--, 0.81<--, 0.84<--, 1.09<--, 0.84<--, 1.14<--              RADIOLOGY & ADDITIONAL TESTS:    Imaging Personally Reviewed:    Consultant(s) Notes Reviewed:      Care Discussed with Consultants/Other Providers: Patient is a 57y old  Male who presents with a chief complaint of Seizures (20 Feb 2018 13:13)      SUBJECTIVE / OVERNIGHT EVENTS:  Patient seen and examined at bedside. No acute events overnight. Patient states that he feels okay and reports wanting to go home. He denies CP, SOB, abd pain, no issues w/BM or urination. Per , patient is now more anxious and worrying about going home. This morning patient had BP elevated to 176/105.    MEDICATIONS  (STANDING):  calcium carbonate 1250 mG + Vitamin D (OsCal 500 + D) 1 Tablet(s) Oral daily  diVALproex  milliGRAM(s) Oral every 8 hours  docusate sodium 100 milliGRAM(s) Oral two times a day  lamoTRIgine 250 milliGRAM(s) Oral every 12 hours  metroNIDAZOLE 0.75% Cream 1 Application(s) Topical two times a day  multivitamin 1 Tablet(s) Oral daily  polyethylene glycol 3350 17 Gram(s) Oral daily    MEDICATIONS  (PRN):  acetaminophen   Tablet. 650 milliGRAM(s) Oral every 6 hours PRN Mild Pain (1 - 3)      Vital Signs Last 24 Hrs  T(C): 36.3 (28 Feb 2018 05:25), Max: 37.1 (27 Feb 2018 21:36)  T(F): 97.4 (28 Feb 2018 05:25), Max: 98.7 (27 Feb 2018 21:36)  HR: 78 (28 Feb 2018 08:32) (75 - 98)  BP: 178/98 (28 Feb 2018 08:32) (125/75 - 178/98)  BP(mean): --  RR: 16 (28 Feb 2018 08:32) (16 - 17)  SpO2: 98% (28 Feb 2018 08:32) (97% - 99%)  CAPILLARY BLOOD GLUCOSE        I&O's Summary    27 Feb 2018 07:01  -  28 Feb 2018 07:00  --------------------------------------------------------  IN: 918 mL / OUT: 0 mL / NET: 918 mL          PHYSICAL EXAM:  tele: sinus 87, V paced no events overnight  General: Comfortable, alert   HEENT: Atraumatic; EOMI, PERRLA, conjunctiva and sclera clear; moist mucous membranes  Neck: Supple; no cervical lymphadenopathy  Chest/Lungs: Clear to auscultation bilaterally. PPM incision site covered with steri strips, intact  Heart: Regular rate and rhythm; normal S1/S2; no murmurs, rubs, or gallops  Abdomen: Soft, nontender, nondistended; bowel sounds present  Extremities: pulses 2+ B/L; no LE edema  Skin: No rashes or lesions  Neurological: alert, awake, responding appropriately to question    LABS:    WBC Trend: 8.85<--, 11.19<--, 11.86<--        Creatinine Trend: 0.81<--, 0.81<--, 0.84<--, 1.09<--, 0.84<--, 1.14<--              RADIOLOGY & ADDITIONAL TESTS:    Imaging Personally Reviewed: x    Consultant(s) Notes Reviewed:      Care Discussed with Consultants/Other Providers:

## 2018-03-01 VITALS
SYSTOLIC BLOOD PRESSURE: 116 MMHG | DIASTOLIC BLOOD PRESSURE: 58 MMHG | RESPIRATION RATE: 15 BRPM | HEART RATE: 93 BPM | OXYGEN SATURATION: 99 % | TEMPERATURE: 98 F

## 2018-03-01 PROCEDURE — 99239 HOSP IP/OBS DSCHRG MGMT >30: CPT

## 2018-03-01 RX ORDER — LAMOTRIGINE 25 MG/1
1 TABLET, ORALLY DISINTEGRATING ORAL
Qty: 60 | Refills: 0 | OUTPATIENT
Start: 2018-03-01 | End: 2018-03-30

## 2018-03-01 RX ORDER — LAMOTRIGINE 25 MG/1
2 TABLET, ORALLY DISINTEGRATING ORAL
Qty: 0 | Refills: 0 | COMMUNITY

## 2018-03-01 RX ORDER — LISINOPRIL 2.5 MG/1
1 TABLET ORAL
Qty: 30 | Refills: 0 | OUTPATIENT
Start: 2018-03-01 | End: 2018-03-30

## 2018-03-01 RX ORDER — LAMOTRIGINE 25 MG/1
2 TABLET, ORALLY DISINTEGRATING ORAL
Qty: 120 | Refills: 0 | OUTPATIENT
Start: 2018-03-01 | End: 2018-03-30

## 2018-03-01 RX ADMIN — Medication 1 TABLET(S): at 11:13

## 2018-03-01 RX ADMIN — METRONIDAZOLE 1 APPLICATION(S): 7.5 GEL VAGINAL at 06:01

## 2018-03-01 RX ADMIN — LAMOTRIGINE 250 MILLIGRAM(S): 25 TABLET, ORALLY DISINTEGRATING ORAL at 06:01

## 2018-03-01 RX ADMIN — LISINOPRIL 10 MILLIGRAM(S): 2.5 TABLET ORAL at 06:01

## 2018-03-01 RX ADMIN — Medication 100 MILLIGRAM(S): at 06:01

## 2018-03-01 RX ADMIN — DIVALPROEX SODIUM 500 MILLIGRAM(S): 500 TABLET, DELAYED RELEASE ORAL at 06:01

## 2018-03-01 NOTE — PROGRESS NOTE ADULT - PROBLEM SELECTOR PROBLEM 1
Tonic clonic seizures

## 2018-03-01 NOTE — PROGRESS NOTE ADULT - PROBLEM SELECTOR PLAN 4
HSQ    - Dispo: Discharge planning to group home, pending PT eval with stairs (needs to be able to ambulate up 14 stairs) HSQ    - Dispo: Discharge planning to group home today

## 2018-03-01 NOTE — PROGRESS NOTE ADULT - NSHPATTENDINGPLANDISCUSS_GEN_ALL_CORE
pt's brother, neurology resident, primary team
pt's brother, neurology resident, primary team
neurology resident, primary team, pt's [facility] home staff
HS
HS
Dr. Perez
HS
Dr. Perez
HS

## 2018-03-01 NOTE — PROGRESS NOTE ADULT - PROBLEM SELECTOR PROBLEM 2
Complete heart block
Intellectual disability
Complete heart block

## 2018-03-01 NOTE — PROGRESS NOTE ADULT - PROBLEM SELECTOR PLAN 1
- Patient has longstanding history of seizures since childhood and was on 4 AED prior to admission.   -Pt found to have complete heartblock S/P pacemaker placement  - Patient now off Keppra and Vimpat. Continue with current seizure regimen: Lamictal 250 Q12h and valproic acid 500 Q8h.  Will d/c on this regimen.  - Q4h neuro checks, aspiration precautions, fall precautions, seizure precautions  - Ativan 2mg IV Q 5minutes, max 2 doses, for confirmed sustained seizure activity lasting greater than 3 minutes per neurology  - F/u with outpatient neurologist Dr. Lennon -stable  - Patient has longstanding history of seizures since childhood and was on 4 AED prior to admission.   -Pt found to have complete heartblock S/P pacemaker placement  - Patient now off Keppra and Vimpat. Continue with current seizure regimen: Lamictal 250 Q12h and valproic acid 500 Q8h.  Will d/c on this regimen.  - Q4h neuro checks, aspiration precautions, fall precautions, seizure precautions  - Ativan 2mg IV Q 5minutes, max 2 doses, for confirmed sustained seizure activity lasting greater than 3 minutes per neurology  - F/u with outpatient neurologist Dr. Lennon

## 2018-03-01 NOTE — PROGRESS NOTE ADULT - PROVIDER SPECIALTY LIST ADULT
Anesthesia
CCU
CCU
Electrophysiology
Internal Medicine
Neurology
Internal Medicine

## 2018-03-01 NOTE — PROGRESS NOTE ADULT - PROBLEM SELECTOR PLAN 3
- Continue using helmet  - Continue with bilateral leg braces while ambulating  - Continue fall precautions - Continue using helmet  - Continue with bilateral leg braces while ambulating  - Continue fall precautions  -continue with seizure precautions

## 2018-03-01 NOTE — PROGRESS NOTE ADULT - PROBLEM SELECTOR PROBLEM 3
Intellectual disability
PAMELA (acute kidney injury)
Intellectual disability

## 2018-03-01 NOTE — PROGRESS NOTE ADULT - SUBJECTIVE AND OBJECTIVE BOX
Patient is a 57y old  Male who presents with a chief complaint of Seizures (20 Feb 2018 13:13)      SUBJECTIVE / OVERNIGHT EVENTS:  Patient seen and examined at bedside. No acute events overnight.     MEDICATIONS  (STANDING):  calcium carbonate 1250 mG + Vitamin D (OsCal 500 + D) 1 Tablet(s) Oral daily  diVALproex  milliGRAM(s) Oral every 8 hours  docusate sodium 100 milliGRAM(s) Oral two times a day  lamoTRIgine 250 milliGRAM(s) Oral every 12 hours  lisinopril 10 milliGRAM(s) Oral daily  metroNIDAZOLE 0.75% Cream 1 Application(s) Topical two times a day  multivitamin 1 Tablet(s) Oral daily  polyethylene glycol 3350 17 Gram(s) Oral daily    MEDICATIONS  (PRN):  acetaminophen   Tablet. 650 milliGRAM(s) Oral every 6 hours PRN Mild Pain (1 - 3)      Vital Signs Last 24 Hrs  T(C): 36.6 (01 Mar 2018 05:57), Max: 37.1 (28 Feb 2018 12:19)  T(F): 97.8 (01 Mar 2018 05:57), Max: 98.7 (28 Feb 2018 12:19)  HR: 98 (01 Mar 2018 07:22) (77 - 98)  BP: 164/100 (01 Mar 2018 05:57) (114/67 - 167/88)  BP(mean): --  RR: 16 (01 Mar 2018 05:57) (16 - 18)  SpO2: 97% (01 Mar 2018 05:57) (97% - 99%)  CAPILLARY BLOOD GLUCOSE        I&O's Summary    28 Feb 2018 07:01  -  01 Mar 2018 07:00  --------------------------------------------------------  IN: 560 mL / OUT: 0 mL / NET: 560 mL        PHYSICAL EXAM:  GENERAL: NAD, well-developed  HEAD:  Atraumatic, Normocephalic  EYES: EOMI,  conjunctiva and sclera clear  NECK: Supple  CHEST/LUNG: Clear to auscultation bilaterally; No wheeze  HEART: Regular rate and rhythm; No murmurs, rubs, or gallops  ABDOMEN: Soft, Nontender, Nondistended; Bowel sounds present  EXTREMITIES:  No clubbing, cyanosis, or edema  PSYCH: AAOx3  NEUROLOGY: non-focal  SKIN: No rashes or lesions    LABS:    WBC Trend: 8.85<--, 11.19<--        Creatinine Trend: 0.81<--, 0.81<--, 0.84<--, 1.09<--, 0.84<--, 1.14<--              RADIOLOGY & ADDITIONAL TESTS:    Imaging Personally Reviewed:    Consultant(s) Notes Reviewed:      Care Discussed with Consultants/Other Providers: Patient is a 57y old  Male who presents with a chief complaint of Seizures (20 Feb 2018 13:13)      SUBJECTIVE / OVERNIGHT EVENTS:  Patient seen and examined at bedside. No acute events overnight. Patient states that he is ready to go home. He reports feeling well. Denies any CP, SOB abd pain and reports moving his bowels well.     MEDICATIONS  (STANDING):  calcium carbonate 1250 mG + Vitamin D (OsCal 500 + D) 1 Tablet(s) Oral daily  diVALproex  milliGRAM(s) Oral every 8 hours  docusate sodium 100 milliGRAM(s) Oral two times a day  lamoTRIgine 250 milliGRAM(s) Oral every 12 hours  lisinopril 10 milliGRAM(s) Oral daily  metroNIDAZOLE 0.75% Cream 1 Application(s) Topical two times a day  multivitamin 1 Tablet(s) Oral daily  polyethylene glycol 3350 17 Gram(s) Oral daily    MEDICATIONS  (PRN):  acetaminophen   Tablet. 650 milliGRAM(s) Oral every 6 hours PRN Mild Pain (1 - 3)      Vital Signs Last 24 Hrs  T(C): 36.6 (01 Mar 2018 05:57), Max: 37.1 (28 Feb 2018 12:19)  T(F): 97.8 (01 Mar 2018 05:57), Max: 98.7 (28 Feb 2018 12:19)  HR: 98 (01 Mar 2018 07:22) (77 - 98)  BP: 164/100 (01 Mar 2018 05:57) (114/67 - 167/88)  BP(mean): --  RR: 16 (01 Mar 2018 05:57) (16 - 18)  SpO2: 97% (01 Mar 2018 05:57) (97% - 99%)  CAPILLARY BLOOD GLUCOSE        I&O's Summary    28 Feb 2018 07:01  -  01 Mar 2018 07:00  --------------------------------------------------------  IN: 560 mL / OUT: 0 mL / NET: 560 mL        PHYSICAL EXAM:  tele: sinus 73, V paced no events overnight  General: Comfortable, alert, eager to go home  HEENT: Atraumatic; EOMI, PERRLA, conjunctiva and sclera clear; moist mucous membranes  Neck: Supple; no cervical lymphadenopathy  Chest/Lungs: Clear to auscultation bilaterally. PPM incision site covered with steri strips, intact  Heart: Regular rate and rhythm; normal S1/S2; no murmurs, rubs, or gallops  Abdomen: Soft, nontender, nondistended; bowel sounds present  Extremities: pulses 2+ B/L; no LE edema  Skin: No rashes or lesions  Neurological: alert, awake, responding appropriately to question      LABS:    WBC Trend: 8.85<--, 11.19<--        Creatinine Trend: 0.81<--, 0.81<--, 0.84<--, 1.09<--, 0.84<--, 1.14<--              RADIOLOGY & ADDITIONAL TESTS:    Imaging Personally Reviewed: x    Consultant(s) Notes Reviewed:  x    Care Discussed with Consultants/Other Providers:

## 2018-03-01 NOTE — PROGRESS NOTE ADULT - ATTENDING COMMENTS
Spoke to brother at bedside about f/u issues and BP control, he understood and agreed with the plan.

## 2018-03-06 ENCOUNTER — APPOINTMENT (OUTPATIENT)
Dept: ELECTROPHYSIOLOGY | Facility: CLINIC | Age: 58
End: 2018-03-06
Payer: MEDICARE

## 2018-03-06 DIAGNOSIS — Z86.79 PERSONAL HISTORY OF OTHER DISEASES OF THE CIRCULATORY SYSTEM: ICD-10-CM

## 2018-03-06 PROCEDURE — 99024 POSTOP FOLLOW-UP VISIT: CPT

## 2018-03-06 RX ORDER — AZITHROMYCIN 250 MG/1
250 TABLET, FILM COATED ORAL
Qty: 6 | Refills: 0 | Status: DISCONTINUED | COMMUNITY
Start: 2017-01-27 | End: 2018-03-06

## 2018-03-15 ENCOUNTER — TRANSCRIPTION ENCOUNTER (OUTPATIENT)
Age: 58
End: 2018-03-15

## 2018-03-22 ENCOUNTER — INPATIENT (INPATIENT)
Facility: HOSPITAL | Age: 58
LOS: 12 days | Discharge: HOME | End: 2018-04-04
Attending: HOSPITALIST | Admitting: HOSPITALIST
Payer: MEDICARE

## 2018-03-22 VITALS
OXYGEN SATURATION: 98 % | DIASTOLIC BLOOD PRESSURE: 66 MMHG | SYSTOLIC BLOOD PRESSURE: 158 MMHG | HEART RATE: 100 BPM | TEMPERATURE: 98 F | RESPIRATION RATE: 16 BRPM

## 2018-03-22 PROCEDURE — 71045 X-RAY EXAM CHEST 1 VIEW: CPT | Mod: 26

## 2018-03-22 RX ORDER — SODIUM CHLORIDE 9 MG/ML
1000 INJECTION INTRAMUSCULAR; INTRAVENOUS; SUBCUTANEOUS ONCE
Qty: 0 | Refills: 0 | Status: COMPLETED | OUTPATIENT
Start: 2018-03-22 | End: 2018-03-22

## 2018-03-22 RX ORDER — MORPHINE SULFATE 50 MG/1
4 CAPSULE, EXTENDED RELEASE ORAL ONCE
Qty: 0 | Refills: 0 | Status: DISCONTINUED | OUTPATIENT
Start: 2018-03-22 | End: 2018-03-23

## 2018-03-22 NOTE — ED PROVIDER NOTE - PROGRESS NOTE DETAILS
[place] PGY4: Accepted A team G[facility] text page sent buenrostro placed as ct shows distended bladder with b/l hydro with return of 1100cc

## 2018-03-22 NOTE — ED PROVIDER NOTE - OBJECTIVE STATEMENT
59 yo M w/ pmhx of Seizure, Cerebral Palsy, from [facility], sent from Urgent Care for fever and elevated WBC to r/o infection. AS per staff from group [place], Pt refused to ear at dinner today, complained of abdomina pain, has been urinating frequently, with mild productive cough, refuse to ambulate and seems very somnolent. Pt vomited about 2 days ago. Denies any diarrhea, constipation, abdominal distention or any other symptoms.

## 2018-03-22 NOTE — ED ADULT TRIAGE NOTE - CHIEF COMPLAINT QUOTE
pt from [facility] group home, pt taken to urgent care because he had a 100.3 temp and BP was 164/95. urgent care sent pt to ED, because "he might have an infection". per staff- pt has had some cough and congestion today. pt denies any complaints, appears comfortable, in NAD. PMH- Cerebral Palsy, Seizures, HTN. pt is compliant with all meds per staff.

## 2018-03-23 ENCOUNTER — APPOINTMENT (OUTPATIENT)
Dept: UROLOGY | Facility: CLINIC | Age: 58
End: 2018-03-23

## 2018-03-23 DIAGNOSIS — I44.2 ATRIOVENTRICULAR BLOCK, COMPLETE: ICD-10-CM

## 2018-03-23 DIAGNOSIS — Z29.9 ENCOUNTER FOR PROPHYLACTIC MEASURES, UNSPECIFIED: ICD-10-CM

## 2018-03-23 DIAGNOSIS — E87.1 HYPO-OSMOLALITY AND HYPONATREMIA: ICD-10-CM

## 2018-03-23 DIAGNOSIS — G40.909 EPILEPSY, UNSPECIFIED, NOT INTRACTABLE, WITHOUT STATUS EPILEPTICUS: ICD-10-CM

## 2018-03-23 DIAGNOSIS — N30.00 ACUTE CYSTITIS WITHOUT HEMATURIA: ICD-10-CM

## 2018-03-23 DIAGNOSIS — R33.9 RETENTION OF URINE, UNSPECIFIED: ICD-10-CM

## 2018-03-23 DIAGNOSIS — A41.9 SEPSIS, UNSPECIFIED ORGANISM: ICD-10-CM

## 2018-03-23 LAB
ALBUMIN SERPL ELPH-MCNC: 3.7 G/DL — SIGNIFICANT CHANGE UP (ref 3.3–5)
ALP SERPL-CCNC: 51 U/L — SIGNIFICANT CHANGE UP (ref 40–120)
ALT FLD-CCNC: 9 U/L — SIGNIFICANT CHANGE UP (ref 4–41)
APPEARANCE UR: CLEAR — SIGNIFICANT CHANGE UP
AST SERPL-CCNC: 15 U/L — SIGNIFICANT CHANGE UP (ref 4–40)
B PERT DNA SPEC QL NAA+PROBE: SIGNIFICANT CHANGE UP
BACTERIA # UR AUTO: SIGNIFICANT CHANGE UP
BASE EXCESS BLDV CALC-SCNC: 4.4 MMOL/L — SIGNIFICANT CHANGE UP
BASOPHILS # BLD AUTO: 0.04 K/UL — SIGNIFICANT CHANGE UP (ref 0–0.2)
BASOPHILS NFR BLD AUTO: 0.2 % — SIGNIFICANT CHANGE UP (ref 0–2)
BILIRUB SERPL-MCNC: 0.3 MG/DL — SIGNIFICANT CHANGE UP (ref 0.2–1.2)
BILIRUB UR-MCNC: NEGATIVE — SIGNIFICANT CHANGE UP
BLOOD GAS VENOUS - CREATININE: 0.77 MG/DL — SIGNIFICANT CHANGE UP (ref 0.5–1.3)
BLOOD UR QL VISUAL: NEGATIVE — SIGNIFICANT CHANGE UP
BUN SERPL-MCNC: 13 MG/DL — SIGNIFICANT CHANGE UP (ref 7–23)
BUN SERPL-MCNC: 9 MG/DL — SIGNIFICANT CHANGE UP (ref 7–23)
C PNEUM DNA SPEC QL NAA+PROBE: NOT DETECTED — SIGNIFICANT CHANGE UP
CALCIUM SERPL-MCNC: 8.4 MG/DL — SIGNIFICANT CHANGE UP (ref 8.4–10.5)
CALCIUM SERPL-MCNC: 9 MG/DL — SIGNIFICANT CHANGE UP (ref 8.4–10.5)
CHLORIDE BLDV-SCNC: 93 MMOL/L — LOW (ref 96–108)
CHLORIDE SERPL-SCNC: 88 MMOL/L — LOW (ref 98–107)
CHLORIDE SERPL-SCNC: 90 MMOL/L — LOW (ref 98–107)
CO2 SERPL-SCNC: 25 MMOL/L — SIGNIFICANT CHANGE UP (ref 22–31)
CO2 SERPL-SCNC: 26 MMOL/L — SIGNIFICANT CHANGE UP (ref 22–31)
COLOR SPEC: YELLOW — SIGNIFICANT CHANGE UP
CREAT SERPL-MCNC: 0.59 MG/DL — SIGNIFICANT CHANGE UP (ref 0.5–1.3)
CREAT SERPL-MCNC: 0.84 MG/DL — SIGNIFICANT CHANGE UP (ref 0.5–1.3)
EOSINOPHIL # BLD AUTO: 0 K/UL — SIGNIFICANT CHANGE UP (ref 0–0.5)
EOSINOPHIL NFR BLD AUTO: 0 % — SIGNIFICANT CHANGE UP (ref 0–6)
FLUAV H1 2009 PAND RNA SPEC QL NAA+PROBE: NOT DETECTED — SIGNIFICANT CHANGE UP
FLUAV H1 RNA SPEC QL NAA+PROBE: NOT DETECTED — SIGNIFICANT CHANGE UP
FLUAV H3 RNA SPEC QL NAA+PROBE: NOT DETECTED — SIGNIFICANT CHANGE UP
FLUAV SUBTYP SPEC NAA+PROBE: SIGNIFICANT CHANGE UP
FLUBV RNA SPEC QL NAA+PROBE: NOT DETECTED — SIGNIFICANT CHANGE UP
GAS PNL BLDV: 121 MMOL/L — LOW (ref 136–146)
GLUCOSE BLDV-MCNC: 118 — HIGH (ref 70–99)
GLUCOSE SERPL-MCNC: 112 MG/DL — HIGH (ref 70–99)
GLUCOSE SERPL-MCNC: 116 MG/DL — HIGH (ref 70–99)
GLUCOSE UR-MCNC: NEGATIVE — SIGNIFICANT CHANGE UP
HADV DNA SPEC QL NAA+PROBE: NOT DETECTED — SIGNIFICANT CHANGE UP
HCO3 BLDV-SCNC: 27 MMOL/L — SIGNIFICANT CHANGE UP (ref 20–27)
HCOV 229E RNA SPEC QL NAA+PROBE: NOT DETECTED — SIGNIFICANT CHANGE UP
HCOV HKU1 RNA SPEC QL NAA+PROBE: NOT DETECTED — SIGNIFICANT CHANGE UP
HCOV NL63 RNA SPEC QL NAA+PROBE: NOT DETECTED — SIGNIFICANT CHANGE UP
HCOV OC43 RNA SPEC QL NAA+PROBE: NOT DETECTED — SIGNIFICANT CHANGE UP
HCT VFR BLD CALC: 34.4 % — LOW (ref 39–50)
HCT VFR BLDV CALC: 37.1 % — LOW (ref 39–51)
HGB BLD-MCNC: 11.8 G/DL — LOW (ref 13–17)
HGB BLDV-MCNC: 12 G/DL — LOW (ref 13–17)
HMPV RNA SPEC QL NAA+PROBE: NOT DETECTED — SIGNIFICANT CHANGE UP
HPIV1 RNA SPEC QL NAA+PROBE: NOT DETECTED — SIGNIFICANT CHANGE UP
HPIV2 RNA SPEC QL NAA+PROBE: NOT DETECTED — SIGNIFICANT CHANGE UP
HPIV3 RNA SPEC QL NAA+PROBE: NOT DETECTED — SIGNIFICANT CHANGE UP
HPIV4 RNA SPEC QL NAA+PROBE: NOT DETECTED — SIGNIFICANT CHANGE UP
IMM GRANULOCYTES # BLD AUTO: 0.13 # — SIGNIFICANT CHANGE UP
IMM GRANULOCYTES NFR BLD AUTO: 0.6 % — SIGNIFICANT CHANGE UP (ref 0–1.5)
KETONES UR-MCNC: SIGNIFICANT CHANGE UP
LACTATE BLDV-MCNC: 1.2 MMOL/L — SIGNIFICANT CHANGE UP (ref 0.5–2)
LEUKOCYTE ESTERASE UR-ACNC: HIGH
LIDOCAIN IGE QN: 14.2 U/L — SIGNIFICANT CHANGE UP (ref 7–60)
LYMPHOCYTES # BLD AUTO: 1.25 K/UL — SIGNIFICANT CHANGE UP (ref 1–3.3)
LYMPHOCYTES # BLD AUTO: 5.6 % — LOW (ref 13–44)
M PNEUMO DNA SPEC QL NAA+PROBE: NOT DETECTED — SIGNIFICANT CHANGE UP
MAGNESIUM SERPL-MCNC: 1.7 MG/DL — SIGNIFICANT CHANGE UP (ref 1.6–2.6)
MAGNESIUM SERPL-MCNC: 1.7 MG/DL — SIGNIFICANT CHANGE UP (ref 1.6–2.6)
MCHC RBC-ENTMCNC: 32.6 PG — SIGNIFICANT CHANGE UP (ref 27–34)
MCHC RBC-ENTMCNC: 34.3 % — SIGNIFICANT CHANGE UP (ref 32–36)
MCV RBC AUTO: 95 FL — SIGNIFICANT CHANGE UP (ref 80–100)
MONOCYTES # BLD AUTO: 2.41 K/UL — HIGH (ref 0–0.9)
MONOCYTES NFR BLD AUTO: 10.9 % — SIGNIFICANT CHANGE UP (ref 2–14)
MUCOUS THREADS # UR AUTO: SIGNIFICANT CHANGE UP
NEUTROPHILS # BLD AUTO: 18.36 K/UL — HIGH (ref 1.8–7.4)
NEUTROPHILS NFR BLD AUTO: 82.7 % — HIGH (ref 43–77)
NITRITE UR-MCNC: NEGATIVE — SIGNIFICANT CHANGE UP
NRBC # FLD: 0 — SIGNIFICANT CHANGE UP
OSMOLALITY SERPL: 263 MOSMO/KG — LOW (ref 275–295)
OSMOLALITY UR: 282 MOSMO/KG — SIGNIFICANT CHANGE UP (ref 50–1200)
PCO2 BLDV: 45 MMHG — SIGNIFICANT CHANGE UP (ref 41–51)
PH BLDV: 7.42 PH — SIGNIFICANT CHANGE UP (ref 7.32–7.43)
PH UR: 7.5 — SIGNIFICANT CHANGE UP (ref 4.6–8)
PHOSPHATE SERPL-MCNC: 2.9 MG/DL — SIGNIFICANT CHANGE UP (ref 2.5–4.5)
PLATELET # BLD AUTO: 181 K/UL — SIGNIFICANT CHANGE UP (ref 150–400)
PMV BLD: 10.9 FL — SIGNIFICANT CHANGE UP (ref 7–13)
PO2 BLDV: 29 MMHG — LOW (ref 35–40)
POTASSIUM BLDV-SCNC: 3.8 MMOL/L — SIGNIFICANT CHANGE UP (ref 3.4–4.5)
POTASSIUM SERPL-MCNC: 4.1 MMOL/L — SIGNIFICANT CHANGE UP (ref 3.5–5.3)
POTASSIUM SERPL-MCNC: 4.3 MMOL/L — SIGNIFICANT CHANGE UP (ref 3.5–5.3)
POTASSIUM SERPL-SCNC: 4.1 MMOL/L — SIGNIFICANT CHANGE UP (ref 3.5–5.3)
POTASSIUM SERPL-SCNC: 4.3 MMOL/L — SIGNIFICANT CHANGE UP (ref 3.5–5.3)
PROT SERPL-MCNC: 7.1 G/DL — SIGNIFICANT CHANGE UP (ref 6–8.3)
PROT UR-MCNC: 20 MG/DL — SIGNIFICANT CHANGE UP
RBC # BLD: 3.62 M/UL — LOW (ref 4.2–5.8)
RBC # FLD: 12.5 % — SIGNIFICANT CHANGE UP (ref 10.3–14.5)
RBC CASTS # UR COMP ASSIST: HIGH (ref 0–?)
RSV RNA SPEC QL NAA+PROBE: NOT DETECTED — SIGNIFICANT CHANGE UP
RV+EV RNA SPEC QL NAA+PROBE: NOT DETECTED — SIGNIFICANT CHANGE UP
SAO2 % BLDV: 49.6 % — LOW (ref 60–85)
SODIUM SERPL-SCNC: 125 MMOL/L — LOW (ref 135–145)
SODIUM SERPL-SCNC: 128 MMOL/L — LOW (ref 135–145)
SODIUM UR-SCNC: 25 MMOL/L — SIGNIFICANT CHANGE UP
SP GR SPEC: 1.02 — SIGNIFICANT CHANGE UP (ref 1–1.04)
SQUAMOUS # UR AUTO: SIGNIFICANT CHANGE UP
UROBILINOGEN FLD QL: NORMAL MG/DL — SIGNIFICANT CHANGE UP
VALPROATE SERPL-MCNC: 112.7 UG/ML — HIGH (ref 50–100)
WBC # BLD: 22.19 K/UL — HIGH (ref 3.8–10.5)
WBC # FLD AUTO: 22.19 K/UL — HIGH (ref 3.8–10.5)
WBC UR QL: >50 — HIGH (ref 0–?)

## 2018-03-23 PROCEDURE — 74177 CT ABD & PELVIS W/CONTRAST: CPT | Mod: 26

## 2018-03-23 PROCEDURE — 99223 1ST HOSP IP/OBS HIGH 75: CPT

## 2018-03-23 PROCEDURE — 93010 ELECTROCARDIOGRAM REPORT: CPT

## 2018-03-23 RX ORDER — LAMOTRIGINE 25 MG/1
200 TABLET, ORALLY DISINTEGRATING ORAL EVERY 12 HOURS
Qty: 0 | Refills: 0 | Status: DISCONTINUED | OUTPATIENT
Start: 2018-03-23 | End: 2018-04-04

## 2018-03-23 RX ORDER — ENOXAPARIN SODIUM 100 MG/ML
40 INJECTION SUBCUTANEOUS EVERY 24 HOURS
Qty: 0 | Refills: 0 | Status: DISCONTINUED | OUTPATIENT
Start: 2018-03-23 | End: 2018-04-04

## 2018-03-23 RX ORDER — PIPERACILLIN AND TAZOBACTAM 4; .5 G/20ML; G/20ML
3.38 INJECTION, POWDER, LYOPHILIZED, FOR SOLUTION INTRAVENOUS ONCE
Qty: 0 | Refills: 0 | Status: COMPLETED | OUTPATIENT
Start: 2018-03-23 | End: 2018-03-23

## 2018-03-23 RX ORDER — METRONIDAZOLE 7.5 MG/G
1 GEL VAGINAL
Qty: 0 | Refills: 0 | COMMUNITY

## 2018-03-23 RX ORDER — SODIUM CHLORIDE 9 MG/ML
1000 INJECTION INTRAMUSCULAR; INTRAVENOUS; SUBCUTANEOUS
Qty: 0 | Refills: 0 | Status: COMPLETED | OUTPATIENT
Start: 2018-03-23 | End: 2018-03-23

## 2018-03-23 RX ORDER — POLYETHYLENE GLYCOL 3350 17 G/17G
17 POWDER, FOR SOLUTION ORAL
Qty: 0 | Refills: 0 | COMMUNITY

## 2018-03-23 RX ORDER — LACOSAMIDE 50 MG/1
100 TABLET ORAL EVERY 12 HOURS
Qty: 0 | Refills: 0 | Status: DISCONTINUED | OUTPATIENT
Start: 2018-03-23 | End: 2018-03-30

## 2018-03-23 RX ORDER — SODIUM CHLORIDE 9 MG/ML
1000 INJECTION INTRAMUSCULAR; INTRAVENOUS; SUBCUTANEOUS
Qty: 0 | Refills: 0 | Status: DISCONTINUED | OUTPATIENT
Start: 2018-03-23 | End: 2018-03-23

## 2018-03-23 RX ORDER — CEFTRIAXONE 500 MG/1
1 INJECTION, POWDER, FOR SOLUTION INTRAMUSCULAR; INTRAVENOUS EVERY 24 HOURS
Qty: 0 | Refills: 0 | Status: DISCONTINUED | OUTPATIENT
Start: 2018-03-23 | End: 2018-03-24

## 2018-03-23 RX ORDER — POLYETHYLENE GLYCOL 3350 17 G/17G
17 POWDER, FOR SOLUTION ORAL DAILY
Qty: 0 | Refills: 0 | Status: DISCONTINUED | OUTPATIENT
Start: 2018-03-23 | End: 2018-04-04

## 2018-03-23 RX ORDER — DOCUSATE SODIUM 100 MG
100 CAPSULE ORAL
Qty: 0 | Refills: 0 | Status: DISCONTINUED | OUTPATIENT
Start: 2018-03-23 | End: 2018-04-04

## 2018-03-23 RX ORDER — SODIUM CHLORIDE 9 MG/ML
1000 INJECTION INTRAMUSCULAR; INTRAVENOUS; SUBCUTANEOUS ONCE
Qty: 0 | Refills: 0 | Status: COMPLETED | OUTPATIENT
Start: 2018-03-23 | End: 2018-03-23

## 2018-03-23 RX ORDER — DIVALPROEX SODIUM 500 MG/1
1000 TABLET, DELAYED RELEASE ORAL AT BEDTIME
Qty: 0 | Refills: 0 | Status: DISCONTINUED | OUTPATIENT
Start: 2018-03-23 | End: 2018-03-25

## 2018-03-23 RX ORDER — ACETAMINOPHEN 500 MG
1000 TABLET ORAL ONCE
Qty: 0 | Refills: 0 | Status: COMPLETED | OUTPATIENT
Start: 2018-03-23 | End: 2018-03-23

## 2018-03-23 RX ORDER — VANCOMYCIN HCL 1 G
1000 VIAL (EA) INTRAVENOUS ONCE
Qty: 0 | Refills: 0 | Status: COMPLETED | OUTPATIENT
Start: 2018-03-23 | End: 2018-03-23

## 2018-03-23 RX ORDER — LAMOTRIGINE 25 MG/1
50 TABLET, ORALLY DISINTEGRATING ORAL EVERY 12 HOURS
Qty: 0 | Refills: 0 | Status: DISCONTINUED | OUTPATIENT
Start: 2018-03-23 | End: 2018-04-04

## 2018-03-23 RX ORDER — LEVETIRACETAM 250 MG/1
750 TABLET, FILM COATED ORAL EVERY 12 HOURS
Qty: 0 | Refills: 0 | Status: DISCONTINUED | OUTPATIENT
Start: 2018-03-23 | End: 2018-03-25

## 2018-03-23 RX ORDER — DIVALPROEX SODIUM 500 MG/1
500 TABLET, DELAYED RELEASE ORAL DAILY
Qty: 0 | Refills: 0 | Status: DISCONTINUED | OUTPATIENT
Start: 2018-03-23 | End: 2018-03-25

## 2018-03-23 RX ADMIN — LAMOTRIGINE 50 MILLIGRAM(S): 25 TABLET, ORALLY DISINTEGRATING ORAL at 17:22

## 2018-03-23 RX ADMIN — Medication 100 MILLIGRAM(S): at 17:21

## 2018-03-23 RX ADMIN — POLYETHYLENE GLYCOL 3350 17 GRAM(S): 17 POWDER, FOR SOLUTION ORAL at 11:52

## 2018-03-23 RX ADMIN — DIVALPROEX SODIUM 500 MILLIGRAM(S): 500 TABLET, DELAYED RELEASE ORAL at 11:52

## 2018-03-23 RX ADMIN — LEVETIRACETAM 750 MILLIGRAM(S): 250 TABLET, FILM COATED ORAL at 17:59

## 2018-03-23 RX ADMIN — DIVALPROEX SODIUM 1000 MILLIGRAM(S): 500 TABLET, DELAYED RELEASE ORAL at 21:11

## 2018-03-23 RX ADMIN — CEFTRIAXONE 100 GRAM(S): 500 INJECTION, POWDER, FOR SOLUTION INTRAMUSCULAR; INTRAVENOUS at 11:52

## 2018-03-23 RX ADMIN — LAMOTRIGINE 200 MILLIGRAM(S): 25 TABLET, ORALLY DISINTEGRATING ORAL at 17:22

## 2018-03-23 RX ADMIN — SODIUM CHLORIDE 1000 MILLILITER(S): 9 INJECTION INTRAMUSCULAR; INTRAVENOUS; SUBCUTANEOUS at 00:26

## 2018-03-23 RX ADMIN — SODIUM CHLORIDE 125 MILLILITER(S): 9 INJECTION INTRAMUSCULAR; INTRAVENOUS; SUBCUTANEOUS at 17:59

## 2018-03-23 RX ADMIN — Medication 400 MILLIGRAM(S): at 04:48

## 2018-03-23 RX ADMIN — LACOSAMIDE 100 MILLIGRAM(S): 50 TABLET ORAL at 17:21

## 2018-03-23 RX ADMIN — PIPERACILLIN AND TAZOBACTAM 200 GRAM(S): 4; .5 INJECTION, POWDER, LYOPHILIZED, FOR SOLUTION INTRAVENOUS at 00:26

## 2018-03-23 RX ADMIN — ENOXAPARIN SODIUM 40 MILLIGRAM(S): 100 INJECTION SUBCUTANEOUS at 11:52

## 2018-03-23 RX ADMIN — Medication 250 MILLIGRAM(S): at 01:18

## 2018-03-23 RX ADMIN — SODIUM CHLORIDE 1000 MILLILITER(S): 9 INJECTION INTRAMUSCULAR; INTRAVENOUS; SUBCUTANEOUS at 01:18

## 2018-03-23 NOTE — H&P ADULT - HISTORY OF PRESENT ILLNESS
HPI:    58 M PMH cerebral palsy, seizure disorder presents with reported 1 day c/o abdominal pain. Patient points to his suprapubic area when asked to demonstrate the location of the pain. It started abruptly on the evening prior to admission. He reports it as a 10/10 in severity and states the pain was relieved after placement of a Heath catheter by ED staff.     On review of ED documentation, patient resides in a group home and was sent to urgent care with concerns of fever and somnolence. He was directed to the ED from urgent care for evaluation of infection. ED documentation further notes that the patient has had reduced appetite and increased urinary frequency.    PAST MEDICAL & SURGICAL HISTORY:  Hyponatremia  External hemorrhoids: c/scopy Aug, 2013  Mental retardation  Hypertension  Seizure  No significant past surgical history      Review of Systems:   CONSTITUTIONAL: No fever, weight loss, or fatigue  EYES: No eye pain, visual disturbances, or discharge  ENMT:  No difficulty hearing, tinnitus, vertigo; No sinus or throat pain  NECK: No pain or stiffness  BREASTS: No pain, masses, or nipple discharge  RESPIRATORY: No cough, wheezing, chills or hemoptysis; No shortness of breath  CARDIOVASCULAR: No chest pain, palpitations, dizziness, or leg swelling  GASTROINTESTINAL: +abdominal pain. No nausea, vomiting, or hematemesis; No diarrhea or constipation. No melena or hematochezia.  GENITOURINARY: No dysuria; +frequency, no hematuria, or incontinence  NEUROLOGICAL: No headaches, memory loss, loss of strength, numbness, or tremors  SKIN: No itching, burning, rashes, or lesions   LYMPH NODES: No enlarged glands  ENDOCRINE: No heat or cold intolerance; No hair loss  MUSCULOSKELETAL: No joint pain or swelling; No muscle, back, or extremity pain  PSYCHIATRIC: No depression, anxiety, mood swings, or difficulty sleeping  HEME/LYMPH: No easy bruising, or bleeding gums  ALLERGY AND IMMUNOLOGIC: No hives or eczema    Allergies    caffeine (Unknown)  chocolate (Unknown)  No Known Drug Allergies    Intolerances        Social History:   Resides in group home. No smoking, drinking, or drug use.    FAMILY HISTORY:  No pertinent family history in first degree relatives      MEDICATIONS  (STANDING):  enoxaparin Injectable 40 milliGRAM(s) SubCutaneous every 24 hours    MEDICATIONS  (PRN):      T(C): 36.9 (18 @ 07:41), Max: 36.9 (18 @ 22:30)  HR: 89 (18 @ 07:41) (89 - 100)  BP: 176/80 (18 @ 07:41) (158/66 - 184/73)  RR: 17 (18 @ 07:41) (16 - 17)  SpO2: 100% (18 @ 07:41) (98% - 100%)    Weight (kg): 0 (18 @ 05:57)  CAPILLARY BLOOD GLUCOSE        I&O's Summary    23 Mar 2018 07:01  -  23 Mar 2018 09:03  --------------------------------------------------------  IN: 0 mL / OUT: 1500 mL / NET: -1500 mL        PHYSICAL EXAM:  GENERAL: NAD, well-developed, well-nourished, lying in stretcher  HEAD:  Atraumatic, Normocephalic, Moist oral mucosa  EYES: EOMI, PERRLA, conjunctiva and sclera clear  NECK: Supple, No elevated JVD  CHEST/LUNG: Clear to auscultation bilaterally; No wheeze  HEART: Regular rate and rhythm; No murmurs, rubs, or gallops  ABDOMEN: Soft, Nontender, Nondistended; Bowel sounds present; Heath in place draining clear yasmin urine  EXTREMITIES:  2+ Peripheral Pulses, No clubbing, cyanosis, or edema  PSYCH: Alert, oriented to self and place, knows it's 2018 but does not know specific day in March  NEUROLOGY: CN II-XII grossly intact, moving all extremities  SKIN: No rashes or lesions    LABS:                        11.8   22.19 )-----------( 181      ( 22 Mar 2018 22:35 )             34.4         125<L>  |  88<L>  |  13  ----------------------------<  116<H>  4.3   |  25  |  0.84    Ca    9.0      22 Mar 2018 22:35  Phos  2.9       Mg     1.7     03-22    TPro  7.1  /  Alb  3.7  /  TBili  0.3  /  DBili  x   /  AST  15  /  ALT  9   /  AlkPhos  51  03-22          Urinalysis Basic - ( 22 Mar 2018 22:35 )    Color: YELLOW / Appearance: CLEAR / S.019 / pH: 7.5  Gluc: NEGATIVE / Ketone: TRACE  / Bili: NEGATIVE / Urobili: NORMAL mg/dL   Blood: NEGATIVE / Protein: 20 mg/dL / Nitrite: NEGATIVE   Leuk Esterase: MODERATE / RBC: 5-10 / WBC >50   Sq Epi: OCC / Non Sq Epi: x / Bacteria: FEW          RADIOLOGY & ADDITIONAL TESTS:    ECG Personally Reviewed -     Imaging Personally Reviewed: CXR - clear lungs    CT a/p reviewed: mild bilateral hydronephrosis with distended bladder    Consultant(s) Notes Reviewed:      Care Discussed with Consultants/Other Providers:

## 2018-03-23 NOTE — H&P ADULT - PROBLEM SELECTOR PLAN 4
No clinically evident seizure activity on exam.    Will c/w home regimen of divalproex, lacosamide, levetiracetam

## 2018-03-23 NOTE — H&P ADULT - PROBLEM SELECTOR PLAN 3
Patient is clinically euvolemic. No significant hyperglycemia on labs. No thiazide use, renal failure, or hypovolemia.    -measure serum osmolality, urine sodium, urine osmolality  -no IV fluids  -continue to trend chemistry

## 2018-03-23 NOTE — H&P ADULT - PROBLEM SELECTOR PLAN 5
Improve score of 1; will initiate enoxaparin for DVT ppx given reduced mobility History of complete heart block. PPM placed 2/2018.    -obtain ECG

## 2018-03-23 NOTE — CHART NOTE - NSCHARTNOTEFT_GEN_A_CORE
Serum osm and urine studies reviewed. Consistent with hypotonic hyponatremia in patient that appears euvolemic. Ingrid 25 suggestive of hypovolemia. Will administer 1L IVNS over 10 hours and repeat chemistry in AM.

## 2018-03-23 NOTE — ED ADULT NURSE REASSESSMENT NOTE - NS ED NURSE REASSESS COMMENT FT1
buenrostro placed with GOPI beckman at bedside. pt tolerated well. 1100cc urine output immediately, dark yasmin. pt appears relieved. NAD noted. will monitor.

## 2018-03-23 NOTE — H&P ADULT - PROBLEM SELECTOR PLAN 1
UA with LE, >50 WBCs, though few squamous cells noted. In light of leukocytosis, this is concerning for cystitis. Heath inserted in ED drained 1100 cc of urine. Patient does not meet sepsis criteria and has not been febrile here. Given overall hemodynamic stability, will narrow antibiotic coverage to ceftriaxone 1g IV daily.    -CTX as above  -f/u urine and blood cultures  -should patient become febrile, tachycardic, hypotensive, will expand coverage to include ESBL organisms and MRSA

## 2018-03-23 NOTE — H&P ADULT - ASSESSMENT
58 M PMH cerebral palsy, seizure disorder presents with reported 1 day c/o abdominal pain. Afebrile, normotensive. On exam, he is alert, oriented; abdomen is soft and non-distended; oral mucosa is moist. On labs, patient has a leukocytosis, sodium 125, UA with LE and >50 WBCs. CT a/p demonstrates mild bilateral hydronephrosis and a distended bladder.

## 2018-03-23 NOTE — H&P ADULT - PROBLEM SELECTOR PLAN 2
Heath inserted by ED drained 1100cc urine. Enlarged prostate noted on CT a/p. Medication list unremarkable for agents associated with urinary retention.     -treat UTI as above  -attempt trial of void prior to discharge

## 2018-03-24 LAB
BASOPHILS # BLD AUTO: 0.05 K/UL — SIGNIFICANT CHANGE UP (ref 0–0.2)
BASOPHILS NFR BLD AUTO: 0.3 % — SIGNIFICANT CHANGE UP (ref 0–2)
BUN SERPL-MCNC: 7 MG/DL — SIGNIFICANT CHANGE UP (ref 7–23)
CALCIUM SERPL-MCNC: 8.3 MG/DL — LOW (ref 8.4–10.5)
CHLORIDE SERPL-SCNC: 95 MMOL/L — LOW (ref 98–107)
CO2 SERPL-SCNC: 24 MMOL/L — SIGNIFICANT CHANGE UP (ref 22–31)
CREAT SERPL-MCNC: 0.63 MG/DL — SIGNIFICANT CHANGE UP (ref 0.5–1.3)
EOSINOPHIL # BLD AUTO: 0.04 K/UL — SIGNIFICANT CHANGE UP (ref 0–0.5)
EOSINOPHIL NFR BLD AUTO: 0.2 % — SIGNIFICANT CHANGE UP (ref 0–6)
GLUCOSE SERPL-MCNC: 86 MG/DL — SIGNIFICANT CHANGE UP (ref 70–99)
HCT VFR BLD CALC: 31.7 % — LOW (ref 39–50)
HGB BLD-MCNC: 10.8 G/DL — LOW (ref 13–17)
IMM GRANULOCYTES # BLD AUTO: 0.08 # — SIGNIFICANT CHANGE UP
IMM GRANULOCYTES NFR BLD AUTO: 0.5 % — SIGNIFICANT CHANGE UP (ref 0–1.5)
LEVETIRACETAM SERPL-MCNC: <2 MCG/ML — LOW (ref 12–46)
LYMPHOCYTES # BLD AUTO: 13.6 % — SIGNIFICANT CHANGE UP (ref 13–44)
LYMPHOCYTES # BLD AUTO: 2.35 K/UL — SIGNIFICANT CHANGE UP (ref 1–3.3)
MAGNESIUM SERPL-MCNC: 1.8 MG/DL — SIGNIFICANT CHANGE UP (ref 1.6–2.6)
MCHC RBC-ENTMCNC: 32.5 PG — SIGNIFICANT CHANGE UP (ref 27–34)
MCHC RBC-ENTMCNC: 34.1 % — SIGNIFICANT CHANGE UP (ref 32–36)
MCV RBC AUTO: 95.5 FL — SIGNIFICANT CHANGE UP (ref 80–100)
MONOCYTES # BLD AUTO: 1.39 K/UL — HIGH (ref 0–0.9)
MONOCYTES NFR BLD AUTO: 8 % — SIGNIFICANT CHANGE UP (ref 2–14)
NEUTROPHILS # BLD AUTO: 13.41 K/UL — HIGH (ref 1.8–7.4)
NEUTROPHILS NFR BLD AUTO: 77.4 % — HIGH (ref 43–77)
NRBC # FLD: 0 — SIGNIFICANT CHANGE UP
PHOSPHATE SERPL-MCNC: 2.4 MG/DL — LOW (ref 2.5–4.5)
PLATELET # BLD AUTO: 179 K/UL — SIGNIFICANT CHANGE UP (ref 150–400)
PMV BLD: 11.3 FL — SIGNIFICANT CHANGE UP (ref 7–13)
POTASSIUM SERPL-MCNC: 4.1 MMOL/L — SIGNIFICANT CHANGE UP (ref 3.5–5.3)
POTASSIUM SERPL-SCNC: 4.1 MMOL/L — SIGNIFICANT CHANGE UP (ref 3.5–5.3)
RBC # BLD: 3.32 M/UL — LOW (ref 4.2–5.8)
RBC # FLD: 12.6 % — SIGNIFICANT CHANGE UP (ref 10.3–14.5)
SODIUM SERPL-SCNC: 132 MMOL/L — LOW (ref 135–145)
SPECIMEN SOURCE: SIGNIFICANT CHANGE UP
WBC # BLD: 17.32 K/UL — HIGH (ref 3.8–10.5)
WBC # FLD AUTO: 17.32 K/UL — HIGH (ref 3.8–10.5)

## 2018-03-24 PROCEDURE — 99233 SBSQ HOSP IP/OBS HIGH 50: CPT

## 2018-03-24 RX ORDER — POTASSIUM PHOSPHATE, MONOBASIC POTASSIUM PHOSPHATE, DIBASIC 236; 224 MG/ML; MG/ML
15 INJECTION, SOLUTION INTRAVENOUS ONCE
Qty: 0 | Refills: 0 | Status: COMPLETED | OUTPATIENT
Start: 2018-03-24 | End: 2018-03-24

## 2018-03-24 RX ORDER — VANCOMYCIN HCL 1 G
VIAL (EA) INTRAVENOUS
Qty: 0 | Refills: 0 | Status: DISCONTINUED | OUTPATIENT
Start: 2018-03-24 | End: 2018-03-25

## 2018-03-24 RX ORDER — VANCOMYCIN HCL 1 G
1000 VIAL (EA) INTRAVENOUS EVERY 24 HOURS
Qty: 0 | Refills: 0 | Status: DISCONTINUED | OUTPATIENT
Start: 2018-03-25 | End: 2018-03-25

## 2018-03-24 RX ORDER — TAMSULOSIN HYDROCHLORIDE 0.4 MG/1
0.4 CAPSULE ORAL AT BEDTIME
Qty: 0 | Refills: 0 | Status: DISCONTINUED | OUTPATIENT
Start: 2018-03-24 | End: 2018-04-04

## 2018-03-24 RX ORDER — VANCOMYCIN HCL 1 G
1000 VIAL (EA) INTRAVENOUS ONCE
Qty: 0 | Refills: 0 | Status: COMPLETED | OUTPATIENT
Start: 2018-03-24 | End: 2018-03-24

## 2018-03-24 RX ADMIN — LEVETIRACETAM 750 MILLIGRAM(S): 250 TABLET, FILM COATED ORAL at 05:39

## 2018-03-24 RX ADMIN — Medication 100 MILLIGRAM(S): at 17:21

## 2018-03-24 RX ADMIN — ENOXAPARIN SODIUM 40 MILLIGRAM(S): 100 INJECTION SUBCUTANEOUS at 11:23

## 2018-03-24 RX ADMIN — LACOSAMIDE 100 MILLIGRAM(S): 50 TABLET ORAL at 17:22

## 2018-03-24 RX ADMIN — LAMOTRIGINE 50 MILLIGRAM(S): 25 TABLET, ORALLY DISINTEGRATING ORAL at 17:22

## 2018-03-24 RX ADMIN — POLYETHYLENE GLYCOL 3350 17 GRAM(S): 17 POWDER, FOR SOLUTION ORAL at 11:23

## 2018-03-24 RX ADMIN — LAMOTRIGINE 200 MILLIGRAM(S): 25 TABLET, ORALLY DISINTEGRATING ORAL at 17:22

## 2018-03-24 RX ADMIN — LAMOTRIGINE 50 MILLIGRAM(S): 25 TABLET, ORALLY DISINTEGRATING ORAL at 05:39

## 2018-03-24 RX ADMIN — LACOSAMIDE 100 MILLIGRAM(S): 50 TABLET ORAL at 05:39

## 2018-03-24 RX ADMIN — DIVALPROEX SODIUM 1000 MILLIGRAM(S): 500 TABLET, DELAYED RELEASE ORAL at 22:19

## 2018-03-24 RX ADMIN — Medication 100 MILLIGRAM(S): at 05:39

## 2018-03-24 RX ADMIN — LAMOTRIGINE 200 MILLIGRAM(S): 25 TABLET, ORALLY DISINTEGRATING ORAL at 05:39

## 2018-03-24 RX ADMIN — LEVETIRACETAM 750 MILLIGRAM(S): 250 TABLET, FILM COATED ORAL at 17:21

## 2018-03-24 RX ADMIN — CEFTRIAXONE 100 GRAM(S): 500 INJECTION, POWDER, FOR SOLUTION INTRAMUSCULAR; INTRAVENOUS at 11:23

## 2018-03-24 RX ADMIN — TAMSULOSIN HYDROCHLORIDE 0.4 MILLIGRAM(S): 0.4 CAPSULE ORAL at 22:19

## 2018-03-24 RX ADMIN — Medication 250 MILLIGRAM(S): at 14:11

## 2018-03-24 RX ADMIN — DIVALPROEX SODIUM 500 MILLIGRAM(S): 500 TABLET, DELAYED RELEASE ORAL at 11:24

## 2018-03-24 NOTE — PROGRESS NOTE ADULT - PROBLEM SELECTOR PLAN 1
UA with LE, >50 WBCs, though few squamous cells noted. In light of leukocytosis, this is concerning for cystitis. Heath inserted in ED drained 1100 cc of urine. Patient does not meet sepsis criteria and has not been febrile here. Given overall hemodynamic stability, will narrow antibiotic coverage to ceftriaxone 1g IV daily.    -CTX as above  -f/u urine and blood cultures  -should patient become febrile, tachycardic, hypotensive, will expand coverage to include ESBL organisms and MRSA UA with LE, >50 WBCs, though few squamous cells noted. In light of leukocytosis, this is concerning for cystitis. Heath inserted in ED drained 1100 cc of urine. Patient does not meet sepsis criteria and has not been febrile here.   urine cx with gram positive cocci .>100,000  will c/w vanco, pt got 1 dose in ED   -will DC CTx   -f/u urine and blood cultures final

## 2018-03-24 NOTE — PROGRESS NOTE ADULT - PROBLEM SELECTOR PLAN 5
History of complete heart block. PPM placed 2/2018.    -obtain ECG History of complete heart block. PPM placed 2/2018.  -will request PPM check

## 2018-03-24 NOTE — PROGRESS NOTE ADULT - SUBJECTIVE AND OBJECTIVE BOX
Patient is a 58y old  Male who presents with a chief complaint of Abdominal pain (23 Mar 2018 08:58)      SUBJECTIVE / OVERNIGHT EVENTS:    MEDICATIONS  (STANDING):  cefTRIAXone   IVPB 1 Gram(s) IV Intermittent every 24 hours  diVALproex  milliGRAM(s) Oral daily  diVALproex DR 1000 milliGRAM(s) Oral at bedtime  docusate sodium 100 milliGRAM(s) Oral two times a day  enoxaparin Injectable 40 milliGRAM(s) SubCutaneous every 24 hours  lacosamide 100 milliGRAM(s) Oral every 12 hours  lamoTRIgine 200 milliGRAM(s) Oral every 12 hours  lamoTRIgine 50 milliGRAM(s) Oral every 12 hours  levETIRAcetam 750 milliGRAM(s) Oral every 12 hours  polyethylene glycol 3350 17 Gram(s) Oral daily  tamsulosin 0.4 milliGRAM(s) Oral at bedtime    MEDICATIONS  (PRN):      Vital Signs Last 24 Hrs  T(C): 37 (24 Mar 2018 05:33), Max: 37.2 (23 Mar 2018 22:19)  T(F): 98.6 (24 Mar 2018 05:33), Max: 99 (23 Mar 2018 22:19)  HR: 80 (24 Mar 2018 05:33) (78 - 89)  BP: 146/87 (24 Mar 2018 05:33) (136/78 - 152/78)  BP(mean): --  RR: 18 (24 Mar 2018 05:33) (17 - 18)  SpO2: 97% (24 Mar 2018 05:33) (96% - 98%)  CAPILLARY BLOOD GLUCOSE        I&O's Summary    23 Mar 2018 07:01  -  24 Mar 2018 07:00  --------------------------------------------------------  IN: 0 mL / OUT: 4400 mL / NET: -4400 mL        PHYSICAL EXAM:  GENERAL: NAD, well-developed  HEAD:  Atraumatic, Normocephalic  EYES: EOMI, PERRLA, conjunctiva and sclera clear  NECK: Supple, No JVD  CHEST/LUNG: Clear to auscultation bilaterally; No wheeze  HEART: Regular rate and rhythm; No murmurs, rubs, or gallops  ABDOMEN: Soft, Nontender, Nondistended; Bowel sounds present  EXTREMITIES:  2+ Peripheral Pulses, No clubbing, cyanosis, or edema  PSYCH: AAOx3  NEUROLOGY: non-focal  SKIN: No rashes or lesions    LABS:                        10.8   17.32 )-----------( 179      ( 24 Mar 2018 05:41 )             31.7     03-24    132<L>  |  95<L>  |  7   ----------------------------<  86  4.1   |  24  |  0.63    Ca    8.3<L>      24 Mar 2018 05:41  Phos  2.4     -24  Mg     1.8     -    TPro  7.1  /  Alb  3.7  /  TBili  0.3  /  DBili  x   /  AST  15  /  ALT  9   /  AlkPhos  51  -          Urinalysis Basic - ( 22 Mar 2018 22:35 )    Color: YELLOW / Appearance: CLEAR / S.019 / pH: 7.5  Gluc: NEGATIVE / Ketone: TRACE  / Bili: NEGATIVE / Urobili: NORMAL mg/dL   Blood: NEGATIVE / Protein: 20 mg/dL / Nitrite: NEGATIVE   Leuk Esterase: MODERATE / RBC: 5-10 / WBC >50   Sq Epi: OCC / Non Sq Epi: x / Bacteria: FEW        RADIOLOGY & ADDITIONAL TESTS:    Imaging Personally Reviewed:    Consultant(s) Notes Reviewed:      Care Discussed with Consultants/Other Providers: Patient is a 58y old  Male who presents with a chief complaint of Abdominal pain (23 Mar 2018 08:58)      SUBJECTIVE / OVERNIGHT EVENTS: patient seen and examined by bedside, denies headache, dizziness, SOB, CP, Palpitations N/V/D, abdominal pain, brother at bedside         MEDICATIONS  (STANDING):  cefTRIAXone   IVPB 1 Gram(s) IV Intermittent every 24 hours  diVALproex  milliGRAM(s) Oral daily  diVALproex DR 1000 milliGRAM(s) Oral at bedtime  docusate sodium 100 milliGRAM(s) Oral two times a day  enoxaparin Injectable 40 milliGRAM(s) SubCutaneous every 24 hours  lacosamide 100 milliGRAM(s) Oral every 12 hours  lamoTRIgine 200 milliGRAM(s) Oral every 12 hours  lamoTRIgine 50 milliGRAM(s) Oral every 12 hours  levETIRAcetam 750 milliGRAM(s) Oral every 12 hours  polyethylene glycol 3350 17 Gram(s) Oral daily  tamsulosin 0.4 milliGRAM(s) Oral at bedtime    MEDICATIONS  (PRN):      Vital Signs Last 24 Hrs  T(C): 37 (24 Mar 2018 05:33), Max: 37.2 (23 Mar 2018 22:19)  T(F): 98.6 (24 Mar 2018 05:33), Max: 99 (23 Mar 2018 22:19)  HR: 80 (24 Mar 2018 05:33) (78 - 89)  BP: 146/87 (24 Mar 2018 05:33) (136/78 - 152/78)  BP(mean): --  RR: 18 (24 Mar 2018 05:33) (17 - 18)  SpO2: 97% (24 Mar 2018 05:33) (96% - 98%)  CAPILLARY BLOOD GLUCOSE        I&O's Summary    23 Mar 2018 07:01  -  24 Mar 2018 07:00  --------------------------------------------------------  IN: 0 mL / OUT: 4400 mL / NET: -4400 mL      PHYSICAL EXAM:  GENERAL: NAD, well-developed,   HEAD:  Atraumatic, Normocephalic, Moist oral mucosa  EYES: EOMI, PERRLA, conjunctiva and sclera clear  NECK: Supple, No elevated JVD  CHEST/LUNG: Clear to auscultation bilaterally; No wheeze  HEART: Regular rate and rhythm;   ABDOMEN: Soft, Nontender, Nondistended; Bowel sounds present; Heath in place draining clear yasmin urine  EXTREMITIES:  2+ Peripheral Pulses, No clubbing, cyanosis, or edema  PSYCH: Alert, oriented to self and place,   NEUROLOGY: CN II-XII grossly intact, moving all extremities, following commands   SKIN: No rashes or lesions        LABS:                        10.8   17.32 )-----------( 179      ( 24 Mar 2018 05:41 )             31.7     03-24    132<L>  |  95<L>  |  7   ----------------------------<  86  4.1   |  24  |  0.63    Ca    8.3<L>      24 Mar 2018 05:41  Phos  2.4     -  Mg     1.8     -    TPro  7.1  /  Alb  3.7  /  TBili  0.3  /  DBili  x   /  AST  15  /  ALT  9   /  AlkPhos  51  -      Culture - Urine (18 @ 00:43)    Culture - Urine:   GPCID^Gram Pos Cocci to be IDed  COLONY COUNT: > = 100,000 CFU/ML    Specimen Source: URINE MIDSTREAM        Urinalysis Basic - ( 22 Mar 2018 22:35 )    Color: YELLOW / Appearance: CLEAR / S.019 / pH: 7.5  Gluc: NEGATIVE / Ketone: TRACE  / Bili: NEGATIVE / Urobili: NORMAL mg/dL   Blood: NEGATIVE / Protein: 20 mg/dL / Nitrite: NEGATIVE   Leuk Esterase: MODERATE / RBC: 5-10 / WBC >50   Sq Epi: OCC / Non Sq Epi: x / Bacteria: FEW        RADIOLOGY & ADDITIONAL TESTS:    Imaging Personally Reviewed:    Consultant(s) Notes Reviewed:      Care Discussed with Consultants/Other Providers:

## 2018-03-24 NOTE — PROGRESS NOTE ADULT - PROBLEM SELECTOR PLAN 4
No clinically evident seizure activity on exam.    Will c/w home regimen of divalproex, lacosamide, levetiracetam No clinically evident seizure activity on exam.  Will c/w home regimen of divalproex, lacosamide, levetiracetam  SZ precaution

## 2018-03-24 NOTE — PROGRESS NOTE ADULT - PROBLEM SELECTOR PLAN 3
Patient is clinically euvolemic. No significant hyperglycemia on labs. No thiazide use, renal failure, or hypovolemia.    -measure serum osmolality, urine sodium, urine osmolality  -no IV fluids  -continue to trend chemistry Patient is clinically euvolemic. No significant hyperglycemia on labs. No thiazide use, renal failure, or hypovolemia.  -e serum osmolality, urine sodium, urine osmolality noted ,likely SIADH ,  -improved   -continue to trend chemistry

## 2018-03-24 NOTE — PROGRESS NOTE ADULT - PROBLEM SELECTOR PLAN 2
Heath inserted by ED drained 1100cc urine. Enlarged prostate noted on CT a/p. Medication list unremarkable for agents associated with urinary retention.     -treat UTI as above  -attempt trial of void prior to discharge Heath inserted by ED drained 1100cc urine. Enlarged prostate noted on CT a/p. Medication list unremarkable for agents associated with urinary retention.   will add flomax     -treat UTI as above  -attempt trial of void prior to discharge

## 2018-03-25 LAB
-  AMPICILLIN: SIGNIFICANT CHANGE UP
-  CIPROFLOXACIN: SIGNIFICANT CHANGE UP
-  NITROFURANTOIN: SIGNIFICANT CHANGE UP
-  TETRACYCLINE: SIGNIFICANT CHANGE UP
-  VANCOMYCIN: SIGNIFICANT CHANGE UP
BACTERIA UR CULT: SIGNIFICANT CHANGE UP
BASOPHILS # BLD AUTO: 0.06 K/UL — SIGNIFICANT CHANGE UP (ref 0–0.2)
BASOPHILS NFR BLD AUTO: 0.5 % — SIGNIFICANT CHANGE UP (ref 0–2)
BUN SERPL-MCNC: 5 MG/DL — LOW (ref 7–23)
CALCIUM SERPL-MCNC: 8.6 MG/DL — SIGNIFICANT CHANGE UP (ref 8.4–10.5)
CHLORIDE SERPL-SCNC: 92 MMOL/L — LOW (ref 98–107)
CO2 SERPL-SCNC: 25 MMOL/L — SIGNIFICANT CHANGE UP (ref 22–31)
CREAT SERPL-MCNC: 0.63 MG/DL — SIGNIFICANT CHANGE UP (ref 0.5–1.3)
EOSINOPHIL # BLD AUTO: 0.16 K/UL — SIGNIFICANT CHANGE UP (ref 0–0.5)
EOSINOPHIL NFR BLD AUTO: 1.4 % — SIGNIFICANT CHANGE UP (ref 0–6)
GLUCOSE SERPL-MCNC: 89 MG/DL — SIGNIFICANT CHANGE UP (ref 70–99)
HCT VFR BLD CALC: 33.3 % — LOW (ref 39–50)
HGB BLD-MCNC: 11.5 G/DL — LOW (ref 13–17)
IMM GRANULOCYTES # BLD AUTO: 0.12 # — SIGNIFICANT CHANGE UP
IMM GRANULOCYTES NFR BLD AUTO: 1.1 % — SIGNIFICANT CHANGE UP (ref 0–1.5)
LYMPHOCYTES # BLD AUTO: 18.3 % — SIGNIFICANT CHANGE UP (ref 13–44)
LYMPHOCYTES # BLD AUTO: 2.02 K/UL — SIGNIFICANT CHANGE UP (ref 1–3.3)
MAGNESIUM SERPL-MCNC: 1.8 MG/DL — SIGNIFICANT CHANGE UP (ref 1.6–2.6)
MCHC RBC-ENTMCNC: 32.7 PG — SIGNIFICANT CHANGE UP (ref 27–34)
MCHC RBC-ENTMCNC: 34.5 % — SIGNIFICANT CHANGE UP (ref 32–36)
MCV RBC AUTO: 94.6 FL — SIGNIFICANT CHANGE UP (ref 80–100)
METHOD TYPE: SIGNIFICANT CHANGE UP
MONOCYTES # BLD AUTO: 0.94 K/UL — HIGH (ref 0–0.9)
MONOCYTES NFR BLD AUTO: 8.5 % — SIGNIFICANT CHANGE UP (ref 2–14)
NEUTROPHILS # BLD AUTO: 7.76 K/UL — HIGH (ref 1.8–7.4)
NEUTROPHILS NFR BLD AUTO: 70.2 % — SIGNIFICANT CHANGE UP (ref 43–77)
NRBC # FLD: 0 — SIGNIFICANT CHANGE UP
ORGANISM # SPEC MICROSCOPIC CNT: SIGNIFICANT CHANGE UP
ORGANISM # SPEC MICROSCOPIC CNT: SIGNIFICANT CHANGE UP
PHOSPHATE SERPL-MCNC: 2.9 MG/DL — SIGNIFICANT CHANGE UP (ref 2.5–4.5)
PLATELET # BLD AUTO: 224 K/UL — SIGNIFICANT CHANGE UP (ref 150–400)
PMV BLD: 10.9 FL — SIGNIFICANT CHANGE UP (ref 7–13)
POTASSIUM SERPL-MCNC: 3.7 MMOL/L — SIGNIFICANT CHANGE UP (ref 3.5–5.3)
POTASSIUM SERPL-SCNC: 3.7 MMOL/L — SIGNIFICANT CHANGE UP (ref 3.5–5.3)
RBC # BLD: 3.52 M/UL — LOW (ref 4.2–5.8)
RBC # FLD: 12.4 % — SIGNIFICANT CHANGE UP (ref 10.3–14.5)
SODIUM SERPL-SCNC: 131 MMOL/L — LOW (ref 135–145)
VANCOMYCIN TROUGH SERPL-MCNC: 2.6 UG/ML — LOW (ref 10–20)
WBC # BLD: 11.06 K/UL — HIGH (ref 3.8–10.5)
WBC # FLD AUTO: 11.06 K/UL — HIGH (ref 3.8–10.5)

## 2018-03-25 PROCEDURE — 99233 SBSQ HOSP IP/OBS HIGH 50: CPT

## 2018-03-25 RX ORDER — LEVETIRACETAM 250 MG/1
750 TABLET, FILM COATED ORAL EVERY 12 HOURS
Qty: 0 | Refills: 0 | Status: DISCONTINUED | OUTPATIENT
Start: 2018-03-25 | End: 2018-03-26

## 2018-03-25 RX ORDER — AMOXICILLIN 250 MG/5ML
500 SUSPENSION, RECONSTITUTED, ORAL (ML) ORAL THREE TIMES A DAY
Qty: 0 | Refills: 0 | Status: DISCONTINUED | OUTPATIENT
Start: 2018-03-25 | End: 2018-03-25

## 2018-03-25 RX ORDER — LACTOBACILLUS ACIDOPHILUS 100MM CELL
1 CAPSULE ORAL
Qty: 0 | Refills: 0 | Status: DISCONTINUED | OUTPATIENT
Start: 2018-03-25 | End: 2018-04-04

## 2018-03-25 RX ORDER — VALPROIC ACID (AS SODIUM SALT) 250 MG/5ML
500 SOLUTION, ORAL ORAL DAILY
Qty: 0 | Refills: 0 | Status: DISCONTINUED | OUTPATIENT
Start: 2018-03-25 | End: 2018-03-26

## 2018-03-25 RX ORDER — AMPICILLIN TRIHYDRATE 250 MG
1 CAPSULE ORAL ONCE
Qty: 0 | Refills: 0 | Status: COMPLETED | OUTPATIENT
Start: 2018-03-25 | End: 2018-03-25

## 2018-03-25 RX ORDER — AMPICILLIN TRIHYDRATE 250 MG
CAPSULE ORAL
Qty: 0 | Refills: 0 | Status: DISCONTINUED | OUTPATIENT
Start: 2018-03-25 | End: 2018-03-26

## 2018-03-25 RX ORDER — SIMETHICONE 80 MG/1
80 TABLET, CHEWABLE ORAL ONCE
Qty: 0 | Refills: 0 | Status: COMPLETED | OUTPATIENT
Start: 2018-03-25 | End: 2018-03-25

## 2018-03-25 RX ORDER — VALPROIC ACID (AS SODIUM SALT) 250 MG/5ML
1000 SOLUTION, ORAL ORAL AT BEDTIME
Qty: 0 | Refills: 0 | Status: DISCONTINUED | OUTPATIENT
Start: 2018-03-25 | End: 2018-03-26

## 2018-03-25 RX ORDER — ONDANSETRON 8 MG/1
4 TABLET, FILM COATED ORAL EVERY 8 HOURS
Qty: 0 | Refills: 0 | Status: DISCONTINUED | OUTPATIENT
Start: 2018-03-25 | End: 2018-04-04

## 2018-03-25 RX ORDER — AMPICILLIN TRIHYDRATE 250 MG
1 CAPSULE ORAL EVERY 6 HOURS
Qty: 0 | Refills: 0 | Status: DISCONTINUED | OUTPATIENT
Start: 2018-03-26 | End: 2018-03-26

## 2018-03-25 RX ORDER — AMPICILLIN TRIHYDRATE 250 MG
CAPSULE ORAL
Qty: 0 | Refills: 0 | Status: DISCONTINUED | OUTPATIENT
Start: 2018-03-25 | End: 2018-03-25

## 2018-03-25 RX ORDER — LEVETIRACETAM 250 MG/1
1000 TABLET, FILM COATED ORAL
Qty: 0 | Refills: 0 | Status: DISCONTINUED | OUTPATIENT
Start: 2018-03-25 | End: 2018-03-25

## 2018-03-25 RX ADMIN — Medication 1 TABLET(S): at 18:02

## 2018-03-25 RX ADMIN — Medication 250 MILLIGRAM(S): at 13:00

## 2018-03-25 RX ADMIN — Medication 100 MILLIGRAM(S): at 06:23

## 2018-03-25 RX ADMIN — SIMETHICONE 80 MILLIGRAM(S): 80 TABLET, CHEWABLE ORAL at 18:03

## 2018-03-25 RX ADMIN — Medication 30 MILLIGRAM(S): at 23:00

## 2018-03-25 RX ADMIN — LAMOTRIGINE 200 MILLIGRAM(S): 25 TABLET, ORALLY DISINTEGRATING ORAL at 06:23

## 2018-03-25 RX ADMIN — LEVETIRACETAM 750 MILLIGRAM(S): 250 TABLET, FILM COATED ORAL at 06:23

## 2018-03-25 RX ADMIN — LAMOTRIGINE 50 MILLIGRAM(S): 25 TABLET, ORALLY DISINTEGRATING ORAL at 18:03

## 2018-03-25 RX ADMIN — LEVETIRACETAM 1000 MILLIGRAM(S): 250 TABLET, FILM COATED ORAL at 18:42

## 2018-03-25 RX ADMIN — Medication 500 MILLIGRAM(S): at 18:02

## 2018-03-25 RX ADMIN — Medication 108 GRAM(S): at 21:46

## 2018-03-25 RX ADMIN — LAMOTRIGINE 50 MILLIGRAM(S): 25 TABLET, ORALLY DISINTEGRATING ORAL at 06:23

## 2018-03-25 RX ADMIN — POLYETHYLENE GLYCOL 3350 17 GRAM(S): 17 POWDER, FOR SOLUTION ORAL at 12:13

## 2018-03-25 RX ADMIN — DIVALPROEX SODIUM 500 MILLIGRAM(S): 500 TABLET, DELAYED RELEASE ORAL at 12:13

## 2018-03-25 RX ADMIN — LAMOTRIGINE 200 MILLIGRAM(S): 25 TABLET, ORALLY DISINTEGRATING ORAL at 18:03

## 2018-03-25 RX ADMIN — TAMSULOSIN HYDROCHLORIDE 0.4 MILLIGRAM(S): 0.4 CAPSULE ORAL at 23:00

## 2018-03-25 RX ADMIN — LACOSAMIDE 100 MILLIGRAM(S): 50 TABLET ORAL at 06:23

## 2018-03-25 RX ADMIN — ENOXAPARIN SODIUM 40 MILLIGRAM(S): 100 INJECTION SUBCUTANEOUS at 12:13

## 2018-03-25 RX ADMIN — LACOSAMIDE 100 MILLIGRAM(S): 50 TABLET ORAL at 18:02

## 2018-03-25 RX ADMIN — Medication 100 MILLIGRAM(S): at 18:02

## 2018-03-25 NOTE — PROGRESS NOTE ADULT - SUBJECTIVE AND OBJECTIVE BOX
Patient is a 58y old  Male who presents with a chief complaint of Abdominal pain (23 Mar 2018 08:58)      SUBJECTIVE / OVERNIGHT EVENTS: patient seen and examined by bedside at 11:10 Am, no acute distress noted  , pt passed TOV. As per brother, pt having staring spells       MEDICATIONS  (STANDING):  diVALproex  milliGRAM(s) Oral daily  diVALproex DR 1000 milliGRAM(s) Oral at bedtime  docusate sodium 100 milliGRAM(s) Oral two times a day  enoxaparin Injectable 40 milliGRAM(s) SubCutaneous every 24 hours  lacosamide 100 milliGRAM(s) Oral every 12 hours  lamoTRIgine 200 milliGRAM(s) Oral every 12 hours  lamoTRIgine 50 milliGRAM(s) Oral every 12 hours  levETIRAcetam 1000 milliGRAM(s) Oral two times a day  polyethylene glycol 3350 17 Gram(s) Oral daily  tamsulosin 0.4 milliGRAM(s) Oral at bedtime  vancomycin  IVPB      vancomycin  IVPB 1000 milliGRAM(s) IV Intermittent every 24 hours    MEDICATIONS  (PRN):      Vital Signs Last 24 Hrs  T(C): 36.6 (25 Mar 2018 14:01), Max: 36.9 (24 Mar 2018 15:24)  T(F): 97.8 (25 Mar 2018 14:01), Max: 98.4 (24 Mar 2018 15:24)  HR: 82 (25 Mar 2018 14:01) (79 - 95)  BP: 146/90 (25 Mar 2018 14:01) (146/90 - 171/105)  BP(mean): --  RR: 18 (25 Mar 2018 14:01) (17 - 18)  SpO2: 98% (25 Mar 2018 14:01) (98% - 99%)  CAPILLARY BLOOD GLUCOSE        I&O's Summary    24 Mar 2018 07:01  -  25 Mar 2018 07:00  --------------------------------------------------------  IN: 1090 mL / OUT: 1150 mL / NET: -60 mL      PHYSICAL EXAM:  GENERAL: NAD, well-developed,   HEAD:  Atraumatic, Normocephalic, Moist oral mucosa  EYES: EOMI, PERRLA, conjunctiva and sclera clear  NECK: Supple, No elevated JVD  CHEST/LUNG: Clear to auscultation bilaterally; No wheeze  HEART: Regular rate and rhythm;   ABDOMEN: Soft, Nontender, Nondistended; Bowel sounds present;   EXTREMITIES:  2+ Peripheral Pulses, No clubbing, cyanosis, or edema  PSYCH: Alert, oriented to self and place,   NEUROLOGY: CN II-XII grossly intact, moving all extremities, following commands   SKIN: No rashes or lesions      LABS:                        11.5   11.06 )-----------( 224      ( 25 Mar 2018 07:34 )             33.3     03-25    131<L>  |  92<L>  |  5<L>  ----------------------------<  89  3.7   |  25  |  0.63    Ca    8.6      25 Mar 2018 07:34  Phos  2.9     03-25  Mg     1.8     03-25    Levetiracetam Level, Serum: <2.0: -------------------ADDITIONAL  INFORMATION-------------------  This test was developed and its performance characteristics  determined by [facility] in a manner consistent with CLIA  requirements. This test has not been cleared or approved by  the U.S. Food and Drug Administration.  Test Performed by:  [facility]  [address] [place] [address]49 mcg/mL (03.22.18 @ 23:35)                RADIOLOGY & ADDITIONAL TESTS:    Imaging Personally Reviewed:    Consultant(s) Notes Reviewed:      Care Discussed with Consultants/Other Providers:

## 2018-03-25 NOTE — PROGRESS NOTE ADULT - PROBLEM SELECTOR PLAN 2
Heath inserted by ED drained 1100cc urine. Enlarged prostate noted on CT a/p. Medication list unremarkable for agents associated with urinary retention.   flomax  added  pt passed TOV   will repeat U/s to evaluate B/L hydro in am

## 2018-03-25 NOTE — PROGRESS NOTE ADULT - PROBLEM SELECTOR PLAN 3
Patient is clinically euvolemic. No significant hyperglycemia on labs. No thiazide use, renal failure, or hypovolemia.  -eserum osmolality, urine sodium, urine osmolality noted ,likely SIADH ,  -improved   -continue to trend chemistry

## 2018-03-25 NOTE — PROGRESS NOTE ADULT - PROBLEM SELECTOR PLAN 4
No clinically evident seizure activity on exam. As per brother, pt having staring spells, not noted  on my exam, case was discussed with neurology by NP , keppra level noted to be<2, keppra  dose adjusted   Will c/w home regimen of divalproex, lacosamide,   SZ precaution

## 2018-03-25 NOTE — PROGRESS NOTE ADULT - PROBLEM SELECTOR PLAN 1
UA with LE, >50 WBCs, though few squamous cells noted. In light of leukocytosis, this is concerning for cystitis. Heath inserted in ED drained 1100 cc of urine. Patient does not meet sepsis criteria and has not been febrile here.   urine cx with gram positive cocci .>100,000  -on  vanco,   -off  CTx   - urine and blood cultures noted, pt with pansensitive E<fecalis, will not use cipro as can lower Sz threshold and interact with AED, will switch to po amoxicillin 500 mg q 8 hrs

## 2018-03-26 LAB
BASOPHILS # BLD AUTO: 0.06 K/UL — SIGNIFICANT CHANGE UP (ref 0–0.2)
BASOPHILS NFR BLD AUTO: 0.6 % — SIGNIFICANT CHANGE UP (ref 0–2)
BUN SERPL-MCNC: 8 MG/DL — SIGNIFICANT CHANGE UP (ref 7–23)
CALCIUM SERPL-MCNC: 8.7 MG/DL — SIGNIFICANT CHANGE UP (ref 8.4–10.5)
CHLORIDE SERPL-SCNC: 91 MMOL/L — LOW (ref 98–107)
CO2 SERPL-SCNC: 27 MMOL/L — SIGNIFICANT CHANGE UP (ref 22–31)
CREAT SERPL-MCNC: 0.69 MG/DL — SIGNIFICANT CHANGE UP (ref 0.5–1.3)
EOSINOPHIL # BLD AUTO: 0.19 K/UL — SIGNIFICANT CHANGE UP (ref 0–0.5)
EOSINOPHIL NFR BLD AUTO: 2 % — SIGNIFICANT CHANGE UP (ref 0–6)
GLUCOSE SERPL-MCNC: 87 MG/DL — SIGNIFICANT CHANGE UP (ref 70–99)
HCT VFR BLD CALC: 33.5 % — LOW (ref 39–50)
HGB BLD-MCNC: 11.7 G/DL — LOW (ref 13–17)
IMM GRANULOCYTES # BLD AUTO: 0.12 # — SIGNIFICANT CHANGE UP
IMM GRANULOCYTES NFR BLD AUTO: 1.2 % — SIGNIFICANT CHANGE UP (ref 0–1.5)
LYMPHOCYTES # BLD AUTO: 2.15 K/UL — SIGNIFICANT CHANGE UP (ref 1–3.3)
LYMPHOCYTES # BLD AUTO: 22.3 % — SIGNIFICANT CHANGE UP (ref 13–44)
MAGNESIUM SERPL-MCNC: 1.7 MG/DL — SIGNIFICANT CHANGE UP (ref 1.6–2.6)
MCHC RBC-ENTMCNC: 33.2 PG — SIGNIFICANT CHANGE UP (ref 27–34)
MCHC RBC-ENTMCNC: 34.9 % — SIGNIFICANT CHANGE UP (ref 32–36)
MCV RBC AUTO: 95.2 FL — SIGNIFICANT CHANGE UP (ref 80–100)
MONOCYTES # BLD AUTO: 1 K/UL — HIGH (ref 0–0.9)
MONOCYTES NFR BLD AUTO: 10.4 % — SIGNIFICANT CHANGE UP (ref 2–14)
NEUTROPHILS # BLD AUTO: 6.11 K/UL — SIGNIFICANT CHANGE UP (ref 1.8–7.4)
NEUTROPHILS NFR BLD AUTO: 63.5 % — SIGNIFICANT CHANGE UP (ref 43–77)
NRBC # FLD: 0 — SIGNIFICANT CHANGE UP
PHOSPHATE SERPL-MCNC: 2.9 MG/DL — SIGNIFICANT CHANGE UP (ref 2.5–4.5)
PLATELET # BLD AUTO: 250 K/UL — SIGNIFICANT CHANGE UP (ref 150–400)
PMV BLD: 10.4 FL — SIGNIFICANT CHANGE UP (ref 7–13)
POTASSIUM SERPL-MCNC: 3.9 MMOL/L — SIGNIFICANT CHANGE UP (ref 3.5–5.3)
POTASSIUM SERPL-SCNC: 3.9 MMOL/L — SIGNIFICANT CHANGE UP (ref 3.5–5.3)
RBC # BLD: 3.52 M/UL — LOW (ref 4.2–5.8)
RBC # FLD: 12.3 % — SIGNIFICANT CHANGE UP (ref 10.3–14.5)
SODIUM SERPL-SCNC: 130 MMOL/L — LOW (ref 135–145)
WBC # BLD: 9.63 K/UL — SIGNIFICANT CHANGE UP (ref 3.8–10.5)
WBC # FLD AUTO: 9.63 K/UL — SIGNIFICANT CHANGE UP (ref 3.8–10.5)

## 2018-03-26 PROCEDURE — 76775 US EXAM ABDO BACK WALL LIM: CPT | Mod: 26

## 2018-03-26 PROCEDURE — 99233 SBSQ HOSP IP/OBS HIGH 50: CPT | Mod: GC

## 2018-03-26 RX ORDER — AMOXICILLIN 250 MG/5ML
500 SUSPENSION, RECONSTITUTED, ORAL (ML) ORAL EVERY 8 HOURS
Qty: 0 | Refills: 0 | Status: DISCONTINUED | OUTPATIENT
Start: 2018-03-26 | End: 2018-03-27

## 2018-03-26 RX ORDER — DIVALPROEX SODIUM 500 MG/1
1000 TABLET, DELAYED RELEASE ORAL
Qty: 0 | Refills: 0 | Status: DISCONTINUED | OUTPATIENT
Start: 2018-03-26 | End: 2018-03-27

## 2018-03-26 RX ORDER — DIVALPROEX SODIUM 500 MG/1
500 TABLET, DELAYED RELEASE ORAL DAILY
Qty: 0 | Refills: 0 | Status: DISCONTINUED | OUTPATIENT
Start: 2018-03-26 | End: 2018-03-27

## 2018-03-26 RX ORDER — LEVETIRACETAM 250 MG/1
750 TABLET, FILM COATED ORAL
Qty: 0 | Refills: 0 | Status: DISCONTINUED | OUTPATIENT
Start: 2018-03-26 | End: 2018-03-27

## 2018-03-26 RX ADMIN — LAMOTRIGINE 50 MILLIGRAM(S): 25 TABLET, ORALLY DISINTEGRATING ORAL at 05:06

## 2018-03-26 RX ADMIN — Medication 100 MILLIGRAM(S): at 05:06

## 2018-03-26 RX ADMIN — LAMOTRIGINE 200 MILLIGRAM(S): 25 TABLET, ORALLY DISINTEGRATING ORAL at 05:06

## 2018-03-26 RX ADMIN — LACOSAMIDE 100 MILLIGRAM(S): 50 TABLET ORAL at 05:06

## 2018-03-26 RX ADMIN — LAMOTRIGINE 200 MILLIGRAM(S): 25 TABLET, ORALLY DISINTEGRATING ORAL at 17:57

## 2018-03-26 RX ADMIN — Medication 108 GRAM(S): at 03:19

## 2018-03-26 RX ADMIN — Medication 100 MILLIGRAM(S): at 17:57

## 2018-03-26 RX ADMIN — Medication 108 GRAM(S): at 11:43

## 2018-03-26 RX ADMIN — Medication 27.5 MILLIGRAM(S): at 12:01

## 2018-03-26 RX ADMIN — LACOSAMIDE 100 MILLIGRAM(S): 50 TABLET ORAL at 18:02

## 2018-03-26 RX ADMIN — TAMSULOSIN HYDROCHLORIDE 0.4 MILLIGRAM(S): 0.4 CAPSULE ORAL at 23:02

## 2018-03-26 RX ADMIN — LEVETIRACETAM 750 MILLIGRAM(S): 250 TABLET, FILM COATED ORAL at 17:55

## 2018-03-26 RX ADMIN — POLYETHYLENE GLYCOL 3350 17 GRAM(S): 17 POWDER, FOR SOLUTION ORAL at 12:00

## 2018-03-26 RX ADMIN — ENOXAPARIN SODIUM 40 MILLIGRAM(S): 100 INJECTION SUBCUTANEOUS at 12:00

## 2018-03-26 RX ADMIN — LEVETIRACETAM 400 MILLIGRAM(S): 250 TABLET, FILM COATED ORAL at 05:05

## 2018-03-26 RX ADMIN — Medication 500 MILLIGRAM(S): at 17:55

## 2018-03-26 RX ADMIN — DIVALPROEX SODIUM 1000 MILLIGRAM(S): 500 TABLET, DELAYED RELEASE ORAL at 17:56

## 2018-03-26 RX ADMIN — Medication 1 TABLET(S): at 12:00

## 2018-03-26 RX ADMIN — Medication 1 TABLET(S): at 17:56

## 2018-03-26 RX ADMIN — Medication 500 MILLIGRAM(S): at 23:02

## 2018-03-26 RX ADMIN — LAMOTRIGINE 50 MILLIGRAM(S): 25 TABLET, ORALLY DISINTEGRATING ORAL at 17:57

## 2018-03-26 NOTE — PROGRESS NOTE ADULT - PROBLEM SELECTOR PLAN 4
No clinically evident seizure activity on exam. As per brother, pt having staring spells, not noted  on my exam, case was discussed with neurology by NP , keppra level noted to be<2, keppra  dose adjusted   Will c/w home regimen of divalproex, lacosamide,   SZ precaution no further staring spells, no evidence of breakthrough sz. cw current AED

## 2018-03-26 NOTE — PROGRESS NOTE ADULT - PROBLEM SELECTOR PLAN 3
Patient is clinically euvolemic. No significant hyperglycemia on labs. No thiazide use, renal failure, or hypovolemia.  -eserum osmolality, urine sodium, urine osmolality noted ,likely SIADH ,  -improved   -continue to trend chemistry Patient is clinically euvolemic. likely due to obstructive uropathy, now resolving.

## 2018-03-26 NOTE — PROGRESS NOTE ADULT - PROBLEM SELECTOR PLAN 6
Improve score of 1; will initiate enoxaparin for DVT ppx given reduced mobility Improve score of 1; will initiate enoxaparin for DVT ppx given reduced mobility  DC planning for today, dw SW and CM. Time spent on DC planning 35 min

## 2018-03-26 NOTE — PROGRESS NOTE ADULT - PROBLEM SELECTOR PLAN 5
History of complete heart block. PPM placed 2/2018.  -will request PPM check History of complete heart block. PPM placed 2/2018.  out-pt PPM eval

## 2018-03-26 NOTE — PROGRESS NOTE ADULT - PROBLEM SELECTOR PLAN 1
will switch to PO amoxicillin. to complete total 7 days course. leukocytosis has resolved. MS at baseline.

## 2018-03-26 NOTE — PROGRESS NOTE ADULT - PROBLEM SELECTOR PLAN 2
Heath inserted by ED drained 1100cc urine. Enlarged prostate noted on CT a/p. Medication list unremarkable for agents associated with urinary retention.   flomax  added  pt passed TOV   will repeat U/s to evaluate B/L hydro in am due to enlarged prostate, presumed BPH, now on Flomax, passed TOV, repeat sono with resolved hydro. out-pt  follow up

## 2018-03-26 NOTE — PROGRESS NOTE ADULT - SUBJECTIVE AND OBJECTIVE BOX
Patient is a 58y old  Male who presents with a chief complaint of Abdominal pain (23 Mar 2018 08:58)      SUBJECTIVE / OVERNIGHT EVENTS: had some N/V yesterday, meds changed to IV. brother at bedside this morning along with assisted living aid. so far today, feels well, no further N/V, haven PO, no further staring spells. patient at baseline, continues to void, has no complaints     ROS:  No HA/DZ  No Vision changes   No CP, SOB  No N/V/D  No Edema  No Rash  NO weakness, numbness    MEDICATIONS  (STANDING):  amoxicillin      Capsule 500 milliGRAM(s) Oral every 8 hours  diVALproex  milliGRAM(s) Oral daily  diVALproex DR 1000 milliGRAM(s) Oral <User Schedule>  docusate sodium 100 milliGRAM(s) Oral two times a day  enoxaparin Injectable 40 milliGRAM(s) SubCutaneous every 24 hours  lacosamide 100 milliGRAM(s) Oral every 12 hours  lactobacillus acidophilus 1 Tablet(s) Oral two times a day with meals  lamoTRIgine 200 milliGRAM(s) Oral every 12 hours  lamoTRIgine 50 milliGRAM(s) Oral every 12 hours  levETIRAcetam 750 milliGRAM(s) Oral two times a day  polyethylene glycol 3350 17 Gram(s) Oral daily  tamsulosin 0.4 milliGRAM(s) Oral at bedtime    MEDICATIONS  (PRN):  ondansetron Injectable 4 milliGRAM(s) IV Push every 8 hours PRN Nausea and/or Vomiting      T(C): 36.3 (03-26-18 @ 12:18)  HR: 93 (03-26-18 @ 12:18)  BP: 150/90 (03-26-18 @ 12:18)  RR: 18 (03-26-18 @ 12:18)  SpO2: 99% (03-26-18 @ 12:18)  CAPILLARY BLOOD GLUCOSE        I&O's Summary    25 Mar 2018 07:01  -  26 Mar 2018 07:00  --------------------------------------------------------  IN: 0 mL / OUT: 400 mL / NET: -400 mL        PHYSICAL EXAM:  GENERAL: NAD, well-developed, AOx3  HEAD:  Atraumatic, Normocephalic  EYES: EOMI, PERRL, conjunctiva and sclera clear  NECK: Supple, No JVD  CHEST/LUNG: Clear to auscultation bilaterally  HEART: Regular rate and rhythm; No murmurs, rubs, or gallops, No Edema  ABDOMEN: Soft, Nontender, Nondistended; Bowel sounds present  EXTREMITIES:  2+ Peripheral Pulses, No clubbing, cyanosis  PSYCH: No SI/HI  NEUROLOGY: non-focal  SKIN: No rashes or lesions    LABS:                        11.7   9.63  )-----------( 250      ( 26 Mar 2018 06:50 )             33.5     03-26    130<L>  |  91<L>  |  8   ----------------------------<  87  3.9   |  27  |  0.69    Ca    8.7      26 Mar 2018 06:50  Phos  2.9     03-26  Mg     1.7     03-26                    RADIOLOGY & ADDITIONAL TESTS:    Imaging Personally Reviewed:    Consultant(s) Notes Reviewed:      Care Discussed with Consultants/Other Providers:

## 2018-03-27 ENCOUNTER — TRANSCRIPTION ENCOUNTER (OUTPATIENT)
Age: 58
End: 2018-03-27

## 2018-03-27 DIAGNOSIS — R11.2 NAUSEA WITH VOMITING, UNSPECIFIED: ICD-10-CM

## 2018-03-27 LAB
BUN SERPL-MCNC: 11 MG/DL — SIGNIFICANT CHANGE UP (ref 7–23)
BUN SERPL-MCNC: 9 MG/DL — SIGNIFICANT CHANGE UP (ref 7–23)
BUN SERPL-MCNC: 9 MG/DL — SIGNIFICANT CHANGE UP (ref 7–23)
CALCIUM SERPL-MCNC: 8.8 MG/DL — SIGNIFICANT CHANGE UP (ref 8.4–10.5)
CALCIUM SERPL-MCNC: 8.8 MG/DL — SIGNIFICANT CHANGE UP (ref 8.4–10.5)
CALCIUM SERPL-MCNC: 9.5 MG/DL — SIGNIFICANT CHANGE UP (ref 8.4–10.5)
CHLORIDE SERPL-SCNC: 87 MMOL/L — LOW (ref 98–107)
CO2 SERPL-SCNC: 27 MMOL/L — SIGNIFICANT CHANGE UP (ref 22–31)
CO2 SERPL-SCNC: 28 MMOL/L — SIGNIFICANT CHANGE UP (ref 22–31)
CO2 SERPL-SCNC: 28 MMOL/L — SIGNIFICANT CHANGE UP (ref 22–31)
CREAT SERPL-MCNC: 0.74 MG/DL — SIGNIFICANT CHANGE UP (ref 0.5–1.3)
CREAT SERPL-MCNC: 0.74 MG/DL — SIGNIFICANT CHANGE UP (ref 0.5–1.3)
CREAT SERPL-MCNC: 0.77 MG/DL — SIGNIFICANT CHANGE UP (ref 0.5–1.3)
GLUCOSE SERPL-MCNC: 130 MG/DL — HIGH (ref 70–99)
GLUCOSE SERPL-MCNC: 93 MG/DL — SIGNIFICANT CHANGE UP (ref 70–99)
GLUCOSE SERPL-MCNC: 93 MG/DL — SIGNIFICANT CHANGE UP (ref 70–99)
MAGNESIUM SERPL-MCNC: 1.8 MG/DL — SIGNIFICANT CHANGE UP (ref 1.6–2.6)
PHOSPHATE SERPL-MCNC: 2.9 MG/DL — SIGNIFICANT CHANGE UP (ref 2.5–4.5)
POTASSIUM SERPL-MCNC: 4.1 MMOL/L — SIGNIFICANT CHANGE UP (ref 3.5–5.3)
POTASSIUM SERPL-MCNC: 4.1 MMOL/L — SIGNIFICANT CHANGE UP (ref 3.5–5.3)
POTASSIUM SERPL-MCNC: 4.7 MMOL/L — SIGNIFICANT CHANGE UP (ref 3.5–5.3)
POTASSIUM SERPL-SCNC: 4.1 MMOL/L — SIGNIFICANT CHANGE UP (ref 3.5–5.3)
POTASSIUM SERPL-SCNC: 4.1 MMOL/L — SIGNIFICANT CHANGE UP (ref 3.5–5.3)
POTASSIUM SERPL-SCNC: 4.7 MMOL/L — SIGNIFICANT CHANGE UP (ref 3.5–5.3)
SODIUM SERPL-SCNC: 125 MMOL/L — LOW (ref 135–145)
SODIUM SERPL-SCNC: 126 MMOL/L — LOW (ref 135–145)
SODIUM SERPL-SCNC: 126 MMOL/L — LOW (ref 135–145)
TSH SERPL-MCNC: 3.36 UIU/ML — SIGNIFICANT CHANGE UP (ref 0.27–4.2)

## 2018-03-27 PROCEDURE — 99233 SBSQ HOSP IP/OBS HIGH 50: CPT

## 2018-03-27 RX ORDER — VALPROIC ACID (AS SODIUM SALT) 250 MG/5ML
500 SOLUTION, ORAL ORAL DAILY
Qty: 0 | Refills: 0 | Status: DISCONTINUED | OUTPATIENT
Start: 2018-03-27 | End: 2018-03-29

## 2018-03-27 RX ORDER — SODIUM CHLORIDE 9 MG/ML
1000 INJECTION INTRAMUSCULAR; INTRAVENOUS; SUBCUTANEOUS
Qty: 0 | Refills: 0 | Status: DISCONTINUED | OUTPATIENT
Start: 2018-03-27 | End: 2018-03-29

## 2018-03-27 RX ORDER — LEVETIRACETAM 250 MG/1
750 TABLET, FILM COATED ORAL EVERY 12 HOURS
Qty: 0 | Refills: 0 | Status: DISCONTINUED | OUTPATIENT
Start: 2018-03-27 | End: 2018-03-29

## 2018-03-27 RX ORDER — VALPROIC ACID (AS SODIUM SALT) 250 MG/5ML
1000 SOLUTION, ORAL ORAL AT BEDTIME
Qty: 0 | Refills: 0 | Status: DISCONTINUED | OUTPATIENT
Start: 2018-03-27 | End: 2018-03-29

## 2018-03-27 RX ORDER — AMPICILLIN TRIHYDRATE 250 MG
1 CAPSULE ORAL EVERY 6 HOURS
Qty: 0 | Refills: 0 | Status: COMPLETED | OUTPATIENT
Start: 2018-03-27 | End: 2018-03-29

## 2018-03-27 RX ADMIN — LEVETIRACETAM 750 MILLIGRAM(S): 250 TABLET, FILM COATED ORAL at 06:09

## 2018-03-27 RX ADMIN — ENOXAPARIN SODIUM 40 MILLIGRAM(S): 100 INJECTION SUBCUTANEOUS at 12:46

## 2018-03-27 RX ADMIN — LACOSAMIDE 100 MILLIGRAM(S): 50 TABLET ORAL at 18:13

## 2018-03-27 RX ADMIN — LAMOTRIGINE 200 MILLIGRAM(S): 25 TABLET, ORALLY DISINTEGRATING ORAL at 06:09

## 2018-03-27 RX ADMIN — LAMOTRIGINE 200 MILLIGRAM(S): 25 TABLET, ORALLY DISINTEGRATING ORAL at 18:11

## 2018-03-27 RX ADMIN — ONDANSETRON 4 MILLIGRAM(S): 8 TABLET, FILM COATED ORAL at 14:35

## 2018-03-27 RX ADMIN — Medication 108 GRAM(S): at 18:11

## 2018-03-27 RX ADMIN — SODIUM CHLORIDE 70 MILLILITER(S): 9 INJECTION INTRAMUSCULAR; INTRAVENOUS; SUBCUTANEOUS at 12:43

## 2018-03-27 RX ADMIN — Medication 27.5 MILLIGRAM(S): at 14:36

## 2018-03-27 RX ADMIN — TAMSULOSIN HYDROCHLORIDE 0.4 MILLIGRAM(S): 0.4 CAPSULE ORAL at 22:23

## 2018-03-27 RX ADMIN — Medication 108 GRAM(S): at 12:51

## 2018-03-27 RX ADMIN — Medication 30 MILLIGRAM(S): at 23:17

## 2018-03-27 RX ADMIN — SODIUM CHLORIDE 70 MILLILITER(S): 9 INJECTION INTRAMUSCULAR; INTRAVENOUS; SUBCUTANEOUS at 23:35

## 2018-03-27 RX ADMIN — LACOSAMIDE 100 MILLIGRAM(S): 50 TABLET ORAL at 06:09

## 2018-03-27 RX ADMIN — LEVETIRACETAM 400 MILLIGRAM(S): 250 TABLET, FILM COATED ORAL at 18:59

## 2018-03-27 RX ADMIN — Medication 500 MILLIGRAM(S): at 06:09

## 2018-03-27 RX ADMIN — ONDANSETRON 4 MILLIGRAM(S): 8 TABLET, FILM COATED ORAL at 23:17

## 2018-03-27 RX ADMIN — Medication 1 TABLET(S): at 18:11

## 2018-03-27 RX ADMIN — LAMOTRIGINE 50 MILLIGRAM(S): 25 TABLET, ORALLY DISINTEGRATING ORAL at 18:11

## 2018-03-27 RX ADMIN — LAMOTRIGINE 50 MILLIGRAM(S): 25 TABLET, ORALLY DISINTEGRATING ORAL at 06:09

## 2018-03-27 RX ADMIN — Medication 100 MILLIGRAM(S): at 06:10

## 2018-03-27 NOTE — DISCHARGE NOTE ADULT - MEDICATION SUMMARY - MEDICATIONS TO TAKE
I will START or STAY ON the medications listed below when I get home from the hospital:    tamsulosin 0.4 mg oral capsule  -- 1 cap(s) by mouth once a day (at bedtime)  -- Indication: For BPH    valproic acid 250 mg/5 mL oral syrup  -- 20 milliliter(s) by mouth once a day (at bedtime)  -- Indication: For Seizure disorder    valproic acid 250 mg/5 mL oral syrup  -- 10 milliliter(s) by mouth once a day  -- Indication: For Seizure disorder    lamoTRIgine 200 mg oral tablet  -- 1  by mouth 2 times a day   -- Indication: For Seizure disorder    levETIRAcetam 750 mg oral tablet  -- 1 tab(s) by mouth 2 times a day  -- Indication: For Seizure disorder    lamoTRIgine 25 mg oral tablet  -- 2 tab(s) by mouth every 12 hours  -- Indication: For Seizure disorder    fluconazole 100 mg oral tablet  -- 1 tab(s) by mouth once a day till 4/23/18  -- Indication: For Candidiasis of esophagus    amLODIPine 5 mg oral tablet  -- 1 tab(s) by mouth once a day  -- Indication: For HTN    metroNIDAZOLE 0.75% topical cream  -- Apply on skin to affected area on face 2 times a day  -- Indication: For Skin care    MiraLax oral powder for reconstitution  -- 17 gram(s) by mouth once a day, dissolved in liquid  -- Indication: For Constipation    Doc-Q-Lace sodium 100 mg oral capsule  -- 1 cap(s) by mouth 2 times a day  -- Indication: For Constipation    pantoprazole 40 mg oral delayed release tablet  -- 1 tab(s) by mouth once a day (before a meal)  -- Indication: For GERD    Multiple Vitamins oral tablet  -- 1 tab(s) by mouth once a day  -- Indication: For Supplement    Calcium 500+D oral tablet, chewable  -- 1 tab(s) by mouth 2 times a day  -- Indication: For Supplement

## 2018-03-27 NOTE — DISCHARGE NOTE ADULT - HOSPITAL COURSE
58 M PMH cerebral palsy, seizure disorder presents with reported 1 day c/o abdominal pain. Afebrile, normotensive. On exam, he is alert, oriented; abdomen is soft and non-distended; oral mucosa is moist. On labs, patient has a leukocytosis, sodium 125, UA with LE and >50 WBCs. CT a/p demonstrates mild bilateral hydronephrosis and a distended bladder.    Acute cystitis without hematuria.    - Urinalysis with LE, >50 WBCs  - Treated with ceftriaxone 1g IV daily d/c, 3/24 started Vanco 1g q24 d/c 3/25  - 3/25 started Ampicilin 1g q6 till 3/29  - 3/23 UrineCx- pos E-Faecalis, Blood cultures- NTD  - CT A/P -Mild bilateral hydroureteronephrosis likely secondary to reflux from a distended bladder.    3/26 pt with Vomitting x2 days  - GI consulted - awaiting recs  - S/S eval_____    Urinary retention with incomplete bladder emptying.   - Heath inserted by ED drained 1100cc urine.   - Enlarged prostate noted on CT a/p.   - 3/24 Heath d/c--> passed trial of void   - 3/26 repeat Renal US- Normal renal ultrasound. Enlarged prostate gland.  - Urology follow up as outpatient    Hyponatremia.   - Nephro consulted Dr Linda  - No thiazide use, renal failure, or hypovolemia.  - serum osmolality-low, urine sodium, urine osmolality  - not SIADH, gentle IVF, Ensure    Seizure disorder  - No clinically evident seizure activity on exam.  - c/w home regimen of Divalproex, Lacosamide, Levetiracetam.   - Valproic Acid- 112.7, Keppra level- <2.0    Complete heart block. Prophylactic measure   - History of complete heart block. PPM placed 2/2018.    Prophylactic measure.  - Improve score of 1; will initiate enoxaparin for DVT ppx given reduced mobility. 58 M PM cerebral palsy, seizure disorder presents with reported 1 day c/o abdominal pain. Afebrile, normotensive. On exam, he is alert, oriented; abdomen is soft and non-distended; oral mucosa is moist. On labs, patient has a leukocytosis, sodium 125, UA with LE and >50 WBCs. CT a/p demonstrates mild bilateral hydronephrosis and a distended bladder.    Acute cystitis without hematuria- Urinalysis with LE, >50 WBCs. Treated with ceftriaxone 1g IV daily d/c, 3/24 started Vanco 1g q24 d/c 3/25  - 3/25 started Ampicilin 1g q6 till 3/29. 3/23 Urine Cx- pos E-Faecalis, Blood cultures- NTD. CT A/P -Mild bilateral hydroureteronephrosis likely secondary to reflux from a distended bladder.    3/26 pt with Vomitting x2 days- GI consulted. 3/2 s/p EGD - Monilial esophagitis. Biopsied. Esophagogastric landmarks identified. Mild antral erythema with normal appearing gastric mucosa. Peristalsis not observed. Normal duodenum. Cytopathology report from Esophagus -NEGATIVE FOR MALIGNANT CELLS.Benign squamous epithelial cells and fungal organisms morphologically consistent with Candida species present. Protonix 40mg po daily for 4 weeks. Give Fluconazole 200mg po once today, then 100mg po daily x 3 weeks till 4/23/18. S/S eval- Regular diet thin liquids f/u as outpatient clinic [phone]    Urinary retention with incomplete bladder emptying- Heath inserted by ED drained 1100cc urine. Enlarged prostate noted on CT a/p.  3/24 Heath d/c--> passed trial of void. 3/26 repeat Renal US- Normal renal ultrasound. Enlarged prostate gland. Urology follow up as outpatient    Hyponatremia- Nephro consulted Dr Linda. No thiazide use, renal failure, or hypovolemia. Serum osmolality-low, urine sodium, urine osmolality, not SIADH, gentle IVF, Ensure    Seizure disorder- No clinically evident seizure activity on exam. c/w home regimen of Divalproex, Lacosamide, Levetiracetam. Valproic Acid- 112.7, Keppra level- <2.0    Complete heart block. Prophylactic measure - History of complete heart block. PPM placed 2/2018.    Prophylactic measure- Improve score of 1; will initiate enoxaparin for DVT ppx given reduced mobility. 58 M PM cerebral palsy, seizure disorder presents with reported 1 day c/o abdominal pain. Afebrile, normotensive. On exam, he is alert, oriented; abdomen is soft and non-distended; oral mucosa is moist. On labs, patient has a leukocytosis, sodium 125, UA with LE and >50 WBCs. CT a/p demonstrates mild bilateral hydronephrosis and a distended bladder.    Acute cystitis without hematuria- Urinalysis with LE, >50 WBCs. Treated with ceftriaxone 1g IV daily d/c, 3/24 started Vanco 1g q24 d/c 3/25  - 3/25 started Ampicilin 1g q6 till 3/29. 3/23 Urine Cx- pos E-Faecalis, Blood cultures- NTD. CT A/P -Mild bilateral hydroureteronephrosis likely secondary to reflux from a distended bladder.    3/26 pt with Vomitting x2 days- GI consulted. 3/2 s/p EGD - Monilial esophagitis. Biopsied. Esophagogastric landmarks identified. Mild antral erythema with normal appearing gastric mucosa. Peristalsis not observed. Normal duodenum. Cytopathology report from Esophagus -NEGATIVE FOR MALIGNANT CELLS.Benign squamous epithelial cells and fungal organisms morphologically consistent with Candida species present. Protonix 40mg po daily for 4 weeks. Give Fluconazole 200mg po once today, then 100mg po daily x 3 weeks till 4/23/18. S/S eval- Regular diet thin liquids f/u as outpatient clinic [phone]    Urinary retention with incomplete bladder emptying- Heath inserted by ED drained 1100cc urine. Enlarged prostate noted on CT a/p.  3/24 Heath d/c--> passed trial of void. 3/26 repeat Renal US- Normal renal ultrasound. Enlarged prostate gland. Urology follow up as outpatient    Hyponatremia- Nephro consulted Dr Linda. No thiazide use, renal failure, or hypovolemia. Serum osmolality-low, urine sodium, urine osmolality, not SIADH, gentle IVF, Pt asymptomatic, started Ensure, repeat BMP with PCP in 1 week. encourage oral intake.     Seizure disorder- No clinically evident seizure activity on exam. c/w home regimen of Divalproex, Lacosamide, Levetiracetam. Valproic Acid- 112.7, Keppra level- <2.0    Complete heart block. Prophylactic measure - History of complete heart block. PPM placed 2/2018.    Prophylactic measure- Improve score of 1; will initiate enoxaparin for DVT ppx given reduced mobility.

## 2018-03-27 NOTE — DISCHARGE NOTE ADULT - CARE PLAN
Principal Discharge DX:	Sepsis  Secondary Diagnosis:	Acute cystitis without hematuria  Secondary Diagnosis:	Hyponatremia  Secondary Diagnosis:	Seizure disorder  Secondary Diagnosis:	Urinary retention with incomplete bladder emptying  Secondary Diagnosis:	Complete heart block Principal Discharge DX:	Sepsis  Goal:	Identify the source of infection, and treat with antimicrobial therapy to eradicate the infection  Secondary Diagnosis:	Acute cystitis without hematuria  Goal:	to relieve symptoms, eliminate the infection and prevent recurrence,  Secondary Diagnosis:	Hyponatremia  Goal:	to have normal sodium levels  Secondary Diagnosis:	Seizure disorder  Goal:	to be seizure free  Secondary Diagnosis:	Urinary retention with incomplete bladder emptying  Secondary Diagnosis:	Complete heart block Principal Discharge DX:	Sepsis  Goal:	Identify the source of infection, and treat with antimicrobial therapy to eradicate the infection  Secondary Diagnosis:	Acute cystitis without hematuria  Goal:	to relieve symptoms, eliminate the infection and prevent recurrence,  Secondary Diagnosis:	Hyponatremia  Goal:	to have normal sodium levels  Secondary Diagnosis:	Seizure disorder  Goal:	to be seizure free  Secondary Diagnosis:	Urinary retention with incomplete bladder emptying  Goal:	to be free of discomfort and completely empty bladder  Assessment and plan of treatment:	Urology follow up as outpatient  Secondary Diagnosis:	Complete heart block  Goal:	To improve conduction  Assessment and plan of treatment:	PPM placed 2/2018. Principal Discharge DX:	Acute cystitis without hematuria  Goal:	Identify the source of infection, and treat with antimicrobial therapy to eradicate the infection  Assessment and plan of treatment:	completed course of Abx  Secondary Diagnosis:	Hyponatremia  Goal:	to relieve symptoms, eliminate the infection and prevent recurrence,  Secondary Diagnosis:	Seizure disorder  Goal:	to have normal sodium levels  Secondary Diagnosis:	Urinary retention with incomplete bladder emptying  Goal:	to be seizure free  Secondary Diagnosis:	Complete heart block  Goal:	to be free of discomfort and completely empty bladder  Assessment and plan of treatment:	Urology follow up as outpatient  Goal:	To improve conduction  Assessment and plan of treatment:	PPM placed 2/2018. Principal Discharge DX:	Acute cystitis without hematuria  Goal:	Identify the source of infection, and treat with antimicrobial therapy to eradicate the infection  Assessment and plan of treatment:	completed course of Antibiotics  Secondary Diagnosis:	Hyponatremia  Goal:	encourage oral intake  Assessment and plan of treatment:	ensure 2x/day  Secondary Diagnosis:	Seizure disorder  Goal:	to remain seizure free  Assessment and plan of treatment:	continue with home seizure medications  Secondary Diagnosis:	Urinary retention with incomplete bladder emptying  Goal:	resolved  Assessment and plan of treatment:	follow up with urology outpatient  Secondary Diagnosis:	Complete heart block  Assessment and plan of treatment:	PPM placed 2/2018. Principal Discharge DX:	Acute cystitis without hematuria  Goal:	Identify the source of infection, and treat with antimicrobial therapy to eradicate the infection  Assessment and plan of treatment:	- UA with LE, >50 WBCs  - Ceftriaxone 1g IV daily d/c, 3/24 started Vanco 1g q24 d/c 3/25  - 3/25 started Ampicilin 1g q6 till 3/29  - 3/23 UrineCx- pos E-Faecalis, Blood cultures- NTD  - CT A/P -Mild bilateral hydroureteronephrosis likely secondary to reflux from a distended bladder.  Secondary Diagnosis:	Hyponatremia  Goal:	encourage oral intake  Assessment and plan of treatment:	- Ensure 2x/day  - Nephro consulted Dr Linda  - No thiazide use, renal failure, or hypovolemia.  - serum osmolality-low, urine sodium, urine osmolality  - not SIADH, gentle IVF, Ensure  Secondary Diagnosis:	Seizure disorder  Goal:	to remain seizure free  Assessment and plan of treatment:	- No clinically evident seizure activity on exam.  - c/w home regimen of Divalproex, Lacosamide, Levetiracetam.   - Valproic Acid- 112.7, 3/26 Keppra level- 14.5  Secondary Diagnosis:	Urinary retention with incomplete bladder emptying  Goal:	resolved  Assessment and plan of treatment:	- Heath inserted by ED drained 1100cc urine.   - Enlarged prostate noted on CT A/P.   - 3/24 Heath d/c--> passed trial of void   - 3/26 repeat Renal US- Normal renal ultrasound. Enlarged prostate gland.  - Urology follow up as outpatient  Secondary Diagnosis:	Complete heart block  Assessment and plan of treatment:	- PPM placed 2/2018.  Secondary Diagnosis:	Essential hypertension  Assessment and plan of treatment:	- started Norvasc 5 mg po daily  Secondary Diagnosis:	Candidiasis of esophagus  Assessment and plan of treatment:	- GI house consulted  - 3/2 s/p EGD - Monilial esophagitis. Biopsied. Esophagogastric landmarks identified. Mild antral erythema with normal appearing gastric mucosa. Peristalsis not observed. Normal duodenum.  - Cytopathology report from Esophagus -NEGATIVE FOR MALIGNANT CELLS.Benign squamous epithelial cells and fungal organisms morphologically consistent with Candida species present.  - Protonix 40mg po daily for 4 weeks  - Give Fluconazole 200mg po once today, then 100mg po daily x 3 weeks till 4/23/18.  - S/S eval- Regular diet thin liquids f/u as outpatient clinic [phone] Principal Discharge DX:	Acute cystitis without hematuria  Goal:	Identify the source of infection, and treat with antimicrobial therapy to eradicate the infection  Assessment and plan of treatment:	- UA with LE, >50 WBCs  - Ceftriaxone 1g IV daily d/c, 3/24 started Vanco 1g q24 d/c 3/25  - 3/25 started Ampicilin 1g q6 till 3/29  - 3/23 UrineCx- pos E-Faecalis, Blood cultures- NTD  - CT A/P -Mild bilateral hydroureteronephrosis likely secondary to reflux from a distended bladder.  Secondary Diagnosis:	Hyponatremia  Goal:	encourage oral intake  Assessment and plan of treatment:	- Ensure 2x/day  - Nephro consulted Dr Linda  - No thiazide use, renal failure, or hypovolemia.  - serum osmolality-low, urine sodium, urine osmolality  - not SIADH, gentle IVF, Ensure, repeat BMP with PCP in 1 week after discharge  Secondary Diagnosis:	Seizure disorder  Goal:	to remain seizure free  Assessment and plan of treatment:	- No clinically evident seizure activity on exam.  - c/w home regimen of Divalproex, Lacosamide, Levetiracetam.   - Valproic Acid- 112.7, 3/26 Keppra level- 14.5  Secondary Diagnosis:	Urinary retention with incomplete bladder emptying  Goal:	resolved  Assessment and plan of treatment:	- Heath inserted by ED drained 1100cc urine.   - Enlarged prostate noted on CT A/P.   - 3/24 Heath d/c--> passed trial of void   - 3/26 repeat Renal US- Normal renal ultrasound. Enlarged prostate gland.  - Urology follow up as outpatient  Secondary Diagnosis:	Complete heart block  Assessment and plan of treatment:	- PPM placed 2/2018.  Secondary Diagnosis:	Essential hypertension  Assessment and plan of treatment:	- started Norvasc 5 mg po daily  Secondary Diagnosis:	Candidiasis of esophagus  Assessment and plan of treatment:	- GI house consulted  - 3/2 s/p EGD - Monilial esophagitis. Biopsied. Esophagogastric landmarks identified. Mild antral erythema with normal appearing gastric mucosa. Peristalsis not observed. Normal duodenum.  - Cytopathology report from Esophagus -NEGATIVE FOR MALIGNANT CELLS.Benign squamous epithelial cells and fungal organisms morphologically consistent with Candida species present.  - Protonix 40mg po daily for 4 weeks  - Give Fluconazole 200mg po once today, then 100mg po daily x 3 weeks till 4/23/18.  - S/S eval- Regular diet thin liquids f/u as outpatient clinic [phone]

## 2018-03-27 NOTE — PROGRESS NOTE ADULT - PROBLEM SELECTOR PLAN 1
post prandial only. no diarrhea or constipation, Abd soft and non-distended, recent CT a/p without acute pathology. no prior hx of dysphagia, GERD, PUD, or recurrent n/v. GI eval, ? EGD r/o gastric outlet obstruction. PRN antiemetics

## 2018-03-27 NOTE — PROGRESS NOTE ADULT - SUBJECTIVE AND OBJECTIVE BOX
Patient is a 58y old  Male who presents with a chief complaint of Abdominal pain (27 Mar 2018 06:30)      SUBJECTIVE / OVERNIGHT EVENTS: this morning had another N/V 1 h after breakfast. wasn't receiving pills at the time. denies abd pain, cough on eating. per brother, this has not been an issue before and that the patient has a robust diet at home without limitations or N/V    ROS:  No HA/DZ  No Vision changes   No CP, SOB  No Edema  No Rash  NO weakness, numbness    MEDICATIONS  (STANDING):  ampicillin  IVPB 1 Gram(s) IV Intermittent every 6 hours  docusate sodium 100 milliGRAM(s) Oral two times a day  enoxaparin Injectable 40 milliGRAM(s) SubCutaneous every 24 hours  lacosamide 100 milliGRAM(s) Oral every 12 hours  lactobacillus acidophilus 1 Tablet(s) Oral two times a day with meals  lamoTRIgine 200 milliGRAM(s) Oral every 12 hours  lamoTRIgine 50 milliGRAM(s) Oral every 12 hours  levETIRAcetam  IVPB 750 milliGRAM(s) IV Intermittent every 12 hours  polyethylene glycol 3350 17 Gram(s) Oral daily  sodium chloride 0.9%. 1000 milliLiter(s) (70 mL/Hr) IV Continuous <Continuous>  tamsulosin 0.4 milliGRAM(s) Oral at bedtime  valproate sodium IVPB 500 milliGRAM(s) IV Intermittent daily  valproate sodium IVPB 1000 milliGRAM(s) IV Intermittent at bedtime    MEDICATIONS  (PRN):  ondansetron Injectable 4 milliGRAM(s) IV Push every 8 hours PRN Nausea and/or Vomiting      T(C): 36.9 (03-27-18 @ 06:07)  HR: 84 (03-27-18 @ 06:07)  BP: 148/96 (03-27-18 @ 06:07)  RR: 17 (03-27-18 @ 06:07)  SpO2: 97% (03-27-18 @ 06:07)  CAPILLARY BLOOD GLUCOSE        I&O's Summary    26 Mar 2018 07:01  -  27 Mar 2018 07:00  --------------------------------------------------------  IN: 600 mL / OUT: 850 mL / NET: -250 mL        PHYSICAL EXAM:  GENERAL: NAD, well-developed, AOx1  HEAD:  Atraumatic, Normocephalic  EYES: EOMI, PERRL, conjunctiva and sclera clear  NECK: Supple, No JVD  CHEST/LUNG: Clear to auscultation bilaterally  HEART: Regular rate and rhythm; No murmurs, rubs, or gallops, No Edema  ABDOMEN: Soft, Nontender, Nondistended; Bowel sounds present  EXTREMITIES:  2+ Peripheral Pulses, No clubbing, cyanosis  PSYCH: No SI/HI  NEUROLOGY: non-focal  SKIN: No rashes or lesions    LABS:                        11.7   9.63  )-----------( 250      ( 26 Mar 2018 06:50 )             33.5     03-27    126<L>  |  87<L>  |  9   ----------------------------<  93  4.1   |  28  |  0.74    Ca    8.8      27 Mar 2018 07:02  Phos  2.9     03-27  Mg     1.8     03-27                    RADIOLOGY & ADDITIONAL TESTS:    Imaging Personally Reviewed:    Consultant(s) Notes Reviewed:      Care Discussed with Consultants/Other Providers: renal - Sergio Dickinson

## 2018-03-27 NOTE — PROGRESS NOTE ADULT - PROBLEM SELECTOR PLAN 5
no further staring spells, no evidence of breakthrough sz. cw current AED, switched to IV due to N/V

## 2018-03-27 NOTE — PROGRESS NOTE ADULT - PROBLEM SELECTOR PLAN 4
initially thought to be SIADH or retention, kept off fluids. now with worsening hyponatremia in setting of N/V. appreciate renal, doesn't appears SIADH based on urine indices. likely hypovolemic in setting of GI loss. will start NS@75cc/hr, check TSH, cortisol, repeat urine lytes and osm. repeat Na later today. renal follow up

## 2018-03-27 NOTE — DISCHARGE NOTE ADULT - PLAN OF CARE
Identify the source of infection, and treat with antimicrobial therapy to eradicate the infection to relieve symptoms, eliminate the infection and prevent recurrence, to have normal sodium levels to be seizure free to be free of discomfort and completely empty bladder Urology follow up as outpatient To improve conduction PPM placed 2/2018. completed course of Abx completed course of Antibiotics encourage oral intake ensure 2x/day to remain seizure free continue with home seizure medications resolved follow up with urology outpatient - UA with LE, >50 WBCs  - Ceftriaxone 1g IV daily d/c, 3/24 started Vanco 1g q24 d/c 3/25  - 3/25 started Ampicilin 1g q6 till 3/29  - 3/23 UrineCx- pos E-Faecalis, Blood cultures- NTD  - CT A/P -Mild bilateral hydroureteronephrosis likely secondary to reflux from a distended bladder. - Ensure 2x/day  - Nephro consulted Dr Linda  - No thiazide use, renal failure, or hypovolemia.  - serum osmolality-low, urine sodium, urine osmolality  - not SIADH, gentle IVF, Ensure - No clinically evident seizure activity on exam.  - c/w home regimen of Divalproex, Lacosamide, Levetiracetam.   - Valproic Acid- 112.7, 3/26 Keppra level- 14.5 - Heath inserted by ED drained 1100cc urine.   - Enlarged prostate noted on CT A/P.   - 3/24 Heath d/c--> passed trial of void   - 3/26 repeat Renal US- Normal renal ultrasound. Enlarged prostate gland.  - Urology follow up as outpatient - PPM placed 2/2018. - started Norvasc 5 mg po daily - GI house consulted  - 3/2 s/p EGD - Monilial esophagitis. Biopsied. Esophagogastric landmarks identified. Mild antral erythema with normal appearing gastric mucosa. Peristalsis not observed. Normal duodenum.  - Cytopathology report from Esophagus -NEGATIVE FOR MALIGNANT CELLS.Benign squamous epithelial cells and fungal organisms morphologically consistent with Candida species present.  - Protonix 40mg po daily for 4 weeks  - Give Fluconazole 200mg po once today, then 100mg po daily x 3 weeks till 4/23/18.  - S/S eval- Regular diet thin liquids f/u as outpatient clinic [phone] - Ensure 2x/day  - Nephro consulted Dr Linda  - No thiazide use, renal failure, or hypovolemia.  - serum osmolality-low, urine sodium, urine osmolality  - not SIADH, gentle IVF, Ensure, repeat BMP with PCP in 1 week after discharge

## 2018-03-27 NOTE — PROGRESS NOTE ADULT - PROBLEM SELECTOR PLAN 3
due to enlarged prostate, presumed BPH, now on Flomax, passed TOV, repeat sono with resolved hydro. out-pt  follow up

## 2018-03-27 NOTE — DISCHARGE NOTE ADULT - SECONDARY DIAGNOSIS.
Acute cystitis without hematuria Hyponatremia Seizure disorder Urinary retention with incomplete bladder emptying Complete heart block Essential hypertension Candidiasis of esophagus

## 2018-03-27 NOTE — CHART NOTE - NSCHARTNOTEFT_GEN_A_CORE
Pt was seen and examined.  Briefly this is a male c hx MR HTN pw urinary retention and now NV  Hyponatremia is not SIADH as the urine osmolarity is not high.  RO hypothyroidism.    Low BUN indicates poor protein intake(Tea-toast diet)    Suggest  -Check TSH   -Start Gentle IVF with NS  -Check urine indices  -Start Ensure      Sayed Maddi  [facility]  [phone]

## 2018-03-27 NOTE — PROGRESS NOTE ADULT - ASSESSMENT
58 M PMH cerebral palsy, seizure disorder presents obstructive uropathy with UTI / hydro, course c/b recurrent hyponatremia in setting of N/V

## 2018-03-27 NOTE — DISCHARGE NOTE ADULT - MEDICATION SUMMARY - MEDICATIONS TO STOP TAKING
I will STOP taking the medications listed below when I get home from the hospital:    Vimpat 100 mg oral tablet  -- 1 tab(s) by mouth 2 times a day

## 2018-03-27 NOTE — DISCHARGE NOTE ADULT - PATIENT PORTAL LINK FT
You can access the [facility] Patient Portal, offered by [facility], by registering with the following website: [email or web address]

## 2018-03-28 LAB
BACTERIA BLD CULT: SIGNIFICANT CHANGE UP
BACTERIA BLD CULT: SIGNIFICANT CHANGE UP
BUN SERPL-MCNC: 9 MG/DL — SIGNIFICANT CHANGE UP (ref 7–23)
CALCIUM SERPL-MCNC: 8.9 MG/DL — SIGNIFICANT CHANGE UP (ref 8.4–10.5)
CHLORIDE SERPL-SCNC: 94 MMOL/L — LOW (ref 98–107)
CHLORIDE UR-SCNC: 62 MMOL/L — SIGNIFICANT CHANGE UP
CO2 SERPL-SCNC: 29 MMOL/L — SIGNIFICANT CHANGE UP (ref 22–31)
CORTIS SERPL-MCNC: 11.5 UG/DL — SIGNIFICANT CHANGE UP (ref 2.7–18.4)
CREAT SERPL-MCNC: 0.77 MG/DL — SIGNIFICANT CHANGE UP (ref 0.5–1.3)
GLUCOSE SERPL-MCNC: 86 MG/DL — SIGNIFICANT CHANGE UP (ref 70–99)
LEVETIRACETAM SERPL-MCNC: 14.5 MCG/ML — SIGNIFICANT CHANGE UP (ref 12–46)
MAGNESIUM SERPL-MCNC: 1.8 MG/DL — SIGNIFICANT CHANGE UP (ref 1.6–2.6)
OSMOLALITY UR: 383 MOSMO/KG — SIGNIFICANT CHANGE UP (ref 50–1200)
PHOSPHATE SERPL-MCNC: 2.9 MG/DL — SIGNIFICANT CHANGE UP (ref 2.5–4.5)
POTASSIUM SERPL-MCNC: 4.1 MMOL/L — SIGNIFICANT CHANGE UP (ref 3.5–5.3)
POTASSIUM SERPL-SCNC: 4.1 MMOL/L — SIGNIFICANT CHANGE UP (ref 3.5–5.3)
POTASSIUM UR-SCNC: 25.9 MMOL/L — SIGNIFICANT CHANGE UP
SODIUM SERPL-SCNC: 133 MMOL/L — LOW (ref 135–145)
SODIUM UR-SCNC: 83 MMOL/L — SIGNIFICANT CHANGE UP
TSH SERPL-MCNC: 4.11 UIU/ML — SIGNIFICANT CHANGE UP (ref 0.27–4.2)

## 2018-03-28 PROCEDURE — 99222 1ST HOSP IP/OBS MODERATE 55: CPT | Mod: GC

## 2018-03-28 PROCEDURE — 99233 SBSQ HOSP IP/OBS HIGH 50: CPT

## 2018-03-28 RX ORDER — PANTOPRAZOLE SODIUM 20 MG/1
40 TABLET, DELAYED RELEASE ORAL
Qty: 0 | Refills: 0 | Status: DISCONTINUED | OUTPATIENT
Start: 2018-03-28 | End: 2018-04-04

## 2018-03-28 RX ADMIN — LEVETIRACETAM 400 MILLIGRAM(S): 250 TABLET, FILM COATED ORAL at 05:54

## 2018-03-28 RX ADMIN — LAMOTRIGINE 50 MILLIGRAM(S): 25 TABLET, ORALLY DISINTEGRATING ORAL at 17:30

## 2018-03-28 RX ADMIN — LACOSAMIDE 100 MILLIGRAM(S): 50 TABLET ORAL at 17:30

## 2018-03-28 RX ADMIN — Medication 27.5 MILLIGRAM(S): at 11:30

## 2018-03-28 RX ADMIN — Medication 30 MILLIGRAM(S): at 23:00

## 2018-03-28 RX ADMIN — Medication 1 TABLET(S): at 09:16

## 2018-03-28 RX ADMIN — SODIUM CHLORIDE 70 MILLILITER(S): 9 INJECTION INTRAMUSCULAR; INTRAVENOUS; SUBCUTANEOUS at 20:00

## 2018-03-28 RX ADMIN — LAMOTRIGINE 50 MILLIGRAM(S): 25 TABLET, ORALLY DISINTEGRATING ORAL at 05:54

## 2018-03-28 RX ADMIN — ENOXAPARIN SODIUM 40 MILLIGRAM(S): 100 INJECTION SUBCUTANEOUS at 11:29

## 2018-03-28 RX ADMIN — Medication 108 GRAM(S): at 11:30

## 2018-03-28 RX ADMIN — TAMSULOSIN HYDROCHLORIDE 0.4 MILLIGRAM(S): 0.4 CAPSULE ORAL at 21:54

## 2018-03-28 RX ADMIN — LAMOTRIGINE 200 MILLIGRAM(S): 25 TABLET, ORALLY DISINTEGRATING ORAL at 05:54

## 2018-03-28 RX ADMIN — Medication 1 TABLET(S): at 17:30

## 2018-03-28 RX ADMIN — Medication 108 GRAM(S): at 01:24

## 2018-03-28 RX ADMIN — Medication 108 GRAM(S): at 05:55

## 2018-03-28 RX ADMIN — LEVETIRACETAM 400 MILLIGRAM(S): 250 TABLET, FILM COATED ORAL at 17:31

## 2018-03-28 RX ADMIN — Medication 108 GRAM(S): at 17:30

## 2018-03-28 RX ADMIN — Medication 100 MILLIGRAM(S): at 05:54

## 2018-03-28 RX ADMIN — LACOSAMIDE 100 MILLIGRAM(S): 50 TABLET ORAL at 05:54

## 2018-03-28 RX ADMIN — Medication 100 MILLIGRAM(S): at 17:30

## 2018-03-28 RX ADMIN — LAMOTRIGINE 200 MILLIGRAM(S): 25 TABLET, ORALLY DISINTEGRATING ORAL at 17:30

## 2018-03-28 NOTE — CONSULT NOTE ADULT - SUBJECTIVE AND OBJECTIVE BOX
HPI: 57 y/o M with hx of Cerebral Palsy, Seizure Disorder, External Hemorrhoids (2013), Complete Heart Block s/p PPM (2/2018). He was admitted for suprapubic pain, that was relieved with placement of buenrostro catheter. He was diagnosed to have a UTI. GI was consulted for symptoms of emesis following oral intake. He had a CT A/P with oral contrast that did not reveal a SBO or gastric outlet obstruction. Per the brother, pt was tolerating a regular diet at home prior to admission. He was also found to have hyponatremia.    PAST MEDICAL & SURGICAL HISTORY:  Hyponatremia  External hemorrhoids: c/scopy Aug, 2013  Mental retardation  Hypertension  Seizure  No significant past surgical history    REVIEW OF SYSTEMS: negative except as per the HPI.    MEDICATIONS  (STANDING):  ampicillin  IVPB 1 Gram(s) IV Intermittent every 6 hours  docusate sodium 100 milliGRAM(s) Oral two times a day  enoxaparin Injectable 40 milliGRAM(s) SubCutaneous every 24 hours  lacosamide 100 milliGRAM(s) Oral every 12 hours  lactobacillus acidophilus 1 Tablet(s) Oral two times a day with meals  lamoTRIgine 200 milliGRAM(s) Oral every 12 hours  lamoTRIgine 50 milliGRAM(s) Oral every 12 hours  levETIRAcetam  IVPB 750 milliGRAM(s) IV Intermittent every 12 hours  polyethylene glycol 3350 17 Gram(s) Oral daily  sodium chloride 0.9%. 1000 milliLiter(s) (70 mL/Hr) IV Continuous <Continuous>  tamsulosin 0.4 milliGRAM(s) Oral at bedtime  valproate sodium IVPB 500 milliGRAM(s) IV Intermittent daily  valproate sodium IVPB 1000 milliGRAM(s) IV Intermittent at bedtime    ALLERGIES: NKDA    SOCIAL HISTORY:  - Alcohol: denies  - Recreational drug use: denies    FAMILY HISTORY:  No pertinent family history in first degree relatives    Vital Signs Last 24 Hrs  T(C): 36.7 (28 Mar 2018 04:54), Max: 37 (27 Mar 2018 20:39)  T(F): 98 (28 Mar 2018 04:54), Max: 98.6 (27 Mar 2018 20:39)  HR: 75 (28 Mar 2018 04:54) (75 - 86)  BP: 157/92 (28 Mar 2018 04:54) (135/90 - 157/92)  BP(mean): --  RR: 18 (28 Mar 2018 04:54) (18 - 18)  SpO2: 98% (28 Mar 2018 04:54) (98% - 99%)    PHYSICAL EXAM:  Constitutional: no acute distress  Eyes: no icterus  Neck: no masses, no LAD  Respiratory: normal inspiratory effort; no wheezing or crackles  Cardiovascular: RRR, normal S1/S2, no murmurs/rubs/gallops  Gastrointestinal: soft, nondistended, nontender, +BS  Extremities: no LE edema  Neurological: AAOx3, no asterixis  Skin: no rashes, bruises, petechiae    LABS:    133<L>  |  94<L>  |  9   ----------------------------<  86  4.1   |  29  |  0.77    Ca    8.9      28 Mar 2018 06:00  Phos  2.9     03-28  Mg     1.8     03-28 HPI: 59 y/o M with hx of Cerebral Palsy, Seizure Disorder, External Hemorrhoids (2013), Complete Heart Block s/p PPM (2/2018). He was admitted for suprapubic pain, that was relieved with placement of buenrostro catheter. He was diagnosed to have a UTI and begun on antibiotics. GI was consulted for symptoms of emesis following oral intake over the past 3-4 days. He had a CT A/P with oral contrast that did not reveal a SBO or gastric outlet obstruction. Per the brother, pt was tolerating a regular diet at home prior to admission. He was also found to have hyponatremia. No known sick contacts. No fever or chills.    PAST MEDICAL & SURGICAL HISTORY:  Hyponatremia  External hemorrhoids: c/scopy Aug, 2013  Mental retardation  Hypertension  Seizure  No significant past surgical history    REVIEW OF SYSTEMS: negative except as per the HPI.    MEDICATIONS  (STANDING):  ampicillin  IVPB 1 Gram(s) IV Intermittent every 6 hours  docusate sodium 100 milliGRAM(s) Oral two times a day  enoxaparin Injectable 40 milliGRAM(s) SubCutaneous every 24 hours  lacosamide 100 milliGRAM(s) Oral every 12 hours  lactobacillus acidophilus 1 Tablet(s) Oral two times a day with meals  lamoTRIgine 200 milliGRAM(s) Oral every 12 hours  lamoTRIgine 50 milliGRAM(s) Oral every 12 hours  levETIRAcetam  IVPB 750 milliGRAM(s) IV Intermittent every 12 hours  polyethylene glycol 3350 17 Gram(s) Oral daily  sodium chloride 0.9%. 1000 milliLiter(s) (70 mL/Hr) IV Continuous <Continuous>  tamsulosin 0.4 milliGRAM(s) Oral at bedtime  valproate sodium IVPB 500 milliGRAM(s) IV Intermittent daily  valproate sodium IVPB 1000 milliGRAM(s) IV Intermittent at bedtime    ALLERGIES: NKDA    SOCIAL HISTORY:  - Alcohol: denies  - Recreational drug use: denies    FAMILY HISTORY:  No pertinent family history in first degree relatives    Vital Signs Last 24 Hrs  T(C): 36.7 (28 Mar 2018 04:54), Max: 37 (27 Mar 2018 20:39)  T(F): 98 (28 Mar 2018 04:54), Max: 98.6 (27 Mar 2018 20:39)  HR: 75 (28 Mar 2018 04:54) (75 - 86)  BP: 157/92 (28 Mar 2018 04:54) (135/90 - 157/92)  BP(mean): --  RR: 18 (28 Mar 2018 04:54) (18 - 18)  SpO2: 98% (28 Mar 2018 04:54) (98% - 99%)    PHYSICAL EXAM:  Constitutional: no acute distress  Eyes: no icterus  Neck: no masses, no LAD  Respiratory: normal inspiratory effort; no wheezing or crackles  Cardiovascular: RRR, normal S1/S2, no murmurs/rubs/gallops  Gastrointestinal: soft, nondistended, nontender, +BS  Extremities: no LE edema  Neurological: AAOx3, no asterixis  Skin: no rashes, bruises, petechiae    LABS:    133<L>  |  94<L>  |  9   ----------------------------<  86  4.1   |  29  |  0.77    Ca    8.9      28 Mar 2018 06:00  Phos  2.9     03-28  Mg     1.8     03-28

## 2018-03-28 NOTE — PROGRESS NOTE ADULT - SUBJECTIVE AND OBJECTIVE BOX
[facility] [phone]        Patient seen and examined in bed.  He was in good spirits.  On IVF        MEDICATIONS  (STANDING):  ampicillin  IVPB 1 Gram(s) IV Intermittent every 6 hours  docusate sodium 100 milliGRAM(s) Oral two times a day  enoxaparin Injectable 40 milliGRAM(s) SubCutaneous every 24 hours  lacosamide 100 milliGRAM(s) Oral every 12 hours  lactobacillus acidophilus 1 Tablet(s) Oral two times a day with meals  lamoTRIgine 200 milliGRAM(s) Oral every 12 hours  lamoTRIgine 50 milliGRAM(s) Oral every 12 hours  levETIRAcetam  IVPB 750 milliGRAM(s) IV Intermittent every 12 hours  polyethylene glycol 3350 17 Gram(s) Oral daily  sodium chloride 0.9%. 1000 milliLiter(s) (70 mL/Hr) IV Continuous <Continuous>  tamsulosin 0.4 milliGRAM(s) Oral at bedtime  valproate sodium IVPB 500 milliGRAM(s) IV Intermittent daily  valproate sodium IVPB 1000 milliGRAM(s) IV Intermittent at bedtime      VITAL:  T(C): , Max: 37 (03-27-18 @ 20:39)  T(F): , Max: 98.6 (03-27-18 @ 20:39)  HR: 75 (03-28-18 @ 04:54)  BP: 157/92 (03-28-18 @ 04:54)  BP(mean): --  RR: 18 (03-28-18 @ 04:54)  SpO2: 98% (03-28-18 @ 04:54)  Wt(kg): --    I and O's:    03-27 @ 07:01  -  03-28 @ 07:00  --------------------------------------------------------  IN: 680 mL / OUT: 350 mL / NET: 330 mL          PHYSICAL EXAM:    Constitutional: NAD  HEENT: PERRLA    Neck:  No JVD  Respiratory: CTAB/L  Cardiovascular: S1 and S2  Gastrointestinal: BS+, soft, NT/ND  Extremities: No peripheral edema  Neurological: A/O x 3, no focal deficits  Psychiatric: Normal mood, normal affect  : No Heath  Skin: No rashes  Access: Not applicable    LABS:    03-28    133<L>  |  94<L>  |  9   ----------------------------<  86  4.1   |  29  |  0.77    Ca    8.9      28 Mar 2018 06:00  Phos  2.9     03-28  Mg     1.8     03-28            Urine Studies:          RADIOLOGY & ADDITIONAL STUDIES:

## 2018-03-28 NOTE — PROGRESS NOTE ADULT - ASSESSMENT
The patient is a katelynn 58-year-old gentleman with history of mental retardation, cerebral palsy, hypertension, seizure disorder, who presents for urinary retention and urinary tract infection.  Course complicated by nausea and vomiting and now has hyponatremia.   This is not syndrome of inappropriate antidiuretic hormone.     Goals of therapy are supportive in nature.      1.	Gastrointestinal.  Try to encourage protein intake.  Ensure with his meals.  2.	Renal.  Will cont normal saline at 70 mL an hour and dc later today?  This is not syndrome of inappropriate antidiuretic hormone.    3.	Neurology.  Continue with seizure medications.

## 2018-03-28 NOTE — PROGRESS NOTE ADULT - SUBJECTIVE AND OBJECTIVE BOX
Patient is a 58y old  Male who presents with a chief complaint of Abdominal pain (27 Mar 2018 06:30)      SUBJECTIVE / OVERNIGHT EVENTS: no further N/V since last event yesterday noon. changed over to IV abx and AED. denies any new complaints     ROS:  No HA/DZ  No Vision changes   No CP, SOB  No N/V/D  No Edema  No Rash  NO weakness, numbness    MEDICATIONS  (STANDING):  ampicillin  IVPB 1 Gram(s) IV Intermittent every 6 hours  docusate sodium 100 milliGRAM(s) Oral two times a day  enoxaparin Injectable 40 milliGRAM(s) SubCutaneous every 24 hours  lacosamide 100 milliGRAM(s) Oral every 12 hours  lactobacillus acidophilus 1 Tablet(s) Oral two times a day with meals  lamoTRIgine 200 milliGRAM(s) Oral every 12 hours  lamoTRIgine 50 milliGRAM(s) Oral every 12 hours  levETIRAcetam  IVPB 750 milliGRAM(s) IV Intermittent every 12 hours  polyethylene glycol 3350 17 Gram(s) Oral daily  sodium chloride 0.9%. 1000 milliLiter(s) (70 mL/Hr) IV Continuous <Continuous>  tamsulosin 0.4 milliGRAM(s) Oral at bedtime  valproate sodium IVPB 500 milliGRAM(s) IV Intermittent daily  valproate sodium IVPB 1000 milliGRAM(s) IV Intermittent at bedtime    MEDICATIONS  (PRN):  ondansetron Injectable 4 milliGRAM(s) IV Push every 8 hours PRN Nausea and/or Vomiting      T(C): 36.7 (03-28-18 @ 04:54)  HR: 75 (03-28-18 @ 04:54)  BP: 157/92 (03-28-18 @ 04:54)  RR: 18 (03-28-18 @ 04:54)  SpO2: 98% (03-28-18 @ 04:54)  CAPILLARY BLOOD GLUCOSE        I&O's Summary    27 Mar 2018 07:01  -  28 Mar 2018 07:00  --------------------------------------------------------  IN: 680 mL / OUT: 350 mL / NET: 330 mL        PHYSICAL EXAM:  GENERAL: NAD, well-developed, AOx1  HEAD:  Atraumatic, Normocephalic  EYES: EOMI, PERRL, conjunctiva and sclera clear  NECK: Supple, No JVD  CHEST/LUNG: Clear to auscultation bilaterally  HEART: Regular rate and rhythm; No murmurs, rubs, or gallops, No Edema  ABDOMEN: Soft, Nontender, Nondistended; Bowel sounds present  EXTREMITIES:  2+ Peripheral Pulses, No clubbing, cyanosis  PSYCH: No SI/HI  NEUROLOGY: non-focal  SKIN: No rashes or lesions    LABS:    03-28    133<L>  |  94<L>  |  9   ----------------------------<  86  4.1   |  29  |  0.77    Ca    8.9      28 Mar 2018 06:00  Phos  2.9     03-28  Mg     1.8     03-28                    RADIOLOGY & ADDITIONAL TESTS:    Imaging Personally Reviewed:    Consultant(s) Notes Reviewed:      Care Discussed with Consultants/Other Providers: GI - Dr Eugene

## 2018-03-28 NOTE — PROGRESS NOTE ADULT - PROBLEM SELECTOR PLAN 1
post prandial only. no diarrhea or constipation, Abd soft and non-distended, recent CT a/p without acute pathology. no prior hx of dysphagia, GERD, PUD, or recurrent n/v. appreciate GI - trial of PPI. no urgent EGD. I suspect abx intolerance, will complete course today IV. no other new meds

## 2018-03-28 NOTE — CONSULT NOTE ADULT - ASSESSMENT
59 y/o M with hx of Cerebral Palsy, Seizure Disorder, External Hemorrhoids (2013), Complete Heart Block s/p PPM (2/2018).     Impression:  1) Emesis - no evidence of SBO, Ileus or Gastric Outlet Obstruction on CT imaging. Ddx includes medication effect, sepsis, rumination syndrome, esophageal dysmotility.    Plan:  - Consider trial of Protonix   - Continue tx of infection  - Aspiration precautions  - Consider Speech and Swallow evaluation as outpatient once clinically improved if symptoms persist  - If symptoms persist, may consider EGD to evaluate UGI tract 59 y/o M with hx of Cerebral Palsy, Seizure Disorder, External Hemorrhoids (2013), Complete Heart Block s/p PPM (2/2018).     Impression:  1) Emesis - no evidence of SBO, Ileus or Gastric Outlet Obstruction on CT imaging. May have GI dysmotility in the setting of acute illness. Ddx includes medication effect, sepsis, rumination syndrome, esophageal dysmotility.    Plan:  - Consider trial of Protonix   - Continue tx of infection  - Aspiration precautions  - Consider Speech and Swallow evaluation as outpatient once clinically improved if symptoms persist  - If symptoms persist, may consider EGD to evaluate UGI tract once acute illness resolves 57 y/o M with hx of Cerebral Palsy, Seizure Disorder, External Hemorrhoids (2013), Complete Heart Block s/p PPM (2/2018).     Impression:  1) Emesis - no evidence of SBO, Ileus or Gastric Outlet Obstruction on CT imaging. May have GI dysmotility in the setting of acute illness. Ddx includes acute gastroenteritis, medication effect, sepsis, rumination syndrome, esophageal dysmotility.    Plan:  - Consider trial of Protonix   - Continue tx of infection  - Aspiration precautions  - No urgent plan for EGD  - If symptoms persist, may consider EGD to evaluate UGI tract once acute illness resolves 59 y/o M with hx of Cerebral Palsy, Seizure Disorder, External Hemorrhoids (2013), Complete Heart Block s/p PPM (2/2018).     Impression:  1) Emesis - no evidence of SBO, Ileus or Gastric Outlet Obstruction on CT imaging. Pt with normal liver enzymes and low suspicion of acute cholecystitis. May have GI dysmotility in the setting of acute illness. Ddx includes acute gastroenteritis, medication effect, sepsis, rumination syndrome, esophageal dysmotility.    Plan:  - Consider trial of Protonix   - Continue tx of infection  - Aspiration precautions  - No urgent plan for EGD  - If symptoms persist, may consider EGD to evaluate UGI tract once acute illness resolves 59 y/o M with hx of Cerebral Palsy, Seizure Disorder, External Hemorrhoids (2013), Complete Heart Block s/p PPM (2/2018).     Impression:  1) Emesis - no evidence of SBO, Ileus or Gastric Outlet Obstruction on CT imaging. Pt with normal liver enzymes and low suspicion of acute cholecystitis. May have GI dysmotility in the setting of acute illness. Ddx includes acute gastroenteritis, medication effect, sepsis, rumination syndrome, esophageal dysmotility.    Plan:  - Consider trial of Protonix   - Continue tx of infection  - Aspiration precautions  - No urgent plan for EGD  - If symptoms persist, may consider EGD to evaluate UGI tract once acute illness resolves as outpatient 57 y/o M with hx of Cerebral Palsy, Seizure Disorder, External Hemorrhoids (2013), Complete Heart Block s/p PPM (2/2018).     Impression:  1) Emesis - likely secondary to combination of antibiotics and dysmotility secondary to recent infection.  Hyponatremia may be also playing a role.  No evidence of SBO, Ileus or Gastric Outlet Obstruction on CT imaging. Ddx includes acute gastroenteritis, rumination syndrome.    Plan:  - Consider trial of Protonix in AM for 2-4 weeks.  - Continue tx of infection  - Aspiration precautions  - No urgent plan for EGD  - If symptoms persist, may consider EGD to evaluate UGI tract once acute illness resolves as outpatient

## 2018-03-28 NOTE — PROGRESS NOTE ADULT - PROBLEM SELECTOR PLAN 4
initially thought to be SIADH or retention, kept off fluids. now with worsening hyponatremia in setting of N/V. appreciate renal, doesn't appears SIADH based on urine indices. likely hypovolemic in setting of GI loss. will cw NS@70cc/hr, TSH, cortisol WNL. renal follow up

## 2018-03-29 LAB
BUN SERPL-MCNC: 7 MG/DL — SIGNIFICANT CHANGE UP (ref 7–23)
CALCIUM SERPL-MCNC: 8.8 MG/DL — SIGNIFICANT CHANGE UP (ref 8.4–10.5)
CHLORIDE SERPL-SCNC: 94 MMOL/L — LOW (ref 98–107)
CO2 SERPL-SCNC: 30 MMOL/L — SIGNIFICANT CHANGE UP (ref 22–31)
CREAT SERPL-MCNC: 0.74 MG/DL — SIGNIFICANT CHANGE UP (ref 0.5–1.3)
GLUCOSE SERPL-MCNC: 83 MG/DL — SIGNIFICANT CHANGE UP (ref 70–99)
POTASSIUM SERPL-MCNC: 4.2 MMOL/L — SIGNIFICANT CHANGE UP (ref 3.5–5.3)
POTASSIUM SERPL-SCNC: 4.2 MMOL/L — SIGNIFICANT CHANGE UP (ref 3.5–5.3)
SODIUM SERPL-SCNC: 133 MMOL/L — LOW (ref 135–145)

## 2018-03-29 PROCEDURE — 99233 SBSQ HOSP IP/OBS HIGH 50: CPT

## 2018-03-29 PROCEDURE — 99232 SBSQ HOSP IP/OBS MODERATE 35: CPT | Mod: GC

## 2018-03-29 RX ORDER — DIVALPROEX SODIUM 500 MG/1
500 TABLET, DELAYED RELEASE ORAL
Qty: 0 | Refills: 0 | Status: DISCONTINUED | OUTPATIENT
Start: 2018-03-29 | End: 2018-04-01

## 2018-03-29 RX ORDER — DIVALPROEX SODIUM 500 MG/1
1000 TABLET, DELAYED RELEASE ORAL AT BEDTIME
Qty: 0 | Refills: 0 | Status: DISCONTINUED | OUTPATIENT
Start: 2018-03-29 | End: 2018-04-01

## 2018-03-29 RX ORDER — LEVETIRACETAM 250 MG/1
750 TABLET, FILM COATED ORAL
Qty: 0 | Refills: 0 | Status: DISCONTINUED | OUTPATIENT
Start: 2018-03-29 | End: 2018-04-04

## 2018-03-29 RX ADMIN — LAMOTRIGINE 50 MILLIGRAM(S): 25 TABLET, ORALLY DISINTEGRATING ORAL at 18:03

## 2018-03-29 RX ADMIN — Medication 100 MILLIGRAM(S): at 05:47

## 2018-03-29 RX ADMIN — LAMOTRIGINE 200 MILLIGRAM(S): 25 TABLET, ORALLY DISINTEGRATING ORAL at 18:03

## 2018-03-29 RX ADMIN — LAMOTRIGINE 200 MILLIGRAM(S): 25 TABLET, ORALLY DISINTEGRATING ORAL at 05:47

## 2018-03-29 RX ADMIN — POLYETHYLENE GLYCOL 3350 17 GRAM(S): 17 POWDER, FOR SOLUTION ORAL at 11:52

## 2018-03-29 RX ADMIN — LACOSAMIDE 100 MILLIGRAM(S): 50 TABLET ORAL at 18:03

## 2018-03-29 RX ADMIN — Medication 1 TABLET(S): at 18:03

## 2018-03-29 RX ADMIN — Medication 108 GRAM(S): at 00:07

## 2018-03-29 RX ADMIN — LACOSAMIDE 100 MILLIGRAM(S): 50 TABLET ORAL at 05:47

## 2018-03-29 RX ADMIN — LAMOTRIGINE 50 MILLIGRAM(S): 25 TABLET, ORALLY DISINTEGRATING ORAL at 05:47

## 2018-03-29 RX ADMIN — LEVETIRACETAM 400 MILLIGRAM(S): 250 TABLET, FILM COATED ORAL at 05:54

## 2018-03-29 RX ADMIN — ENOXAPARIN SODIUM 40 MILLIGRAM(S): 100 INJECTION SUBCUTANEOUS at 11:52

## 2018-03-29 RX ADMIN — LEVETIRACETAM 750 MILLIGRAM(S): 250 TABLET, FILM COATED ORAL at 18:03

## 2018-03-29 RX ADMIN — DIVALPROEX SODIUM 1000 MILLIGRAM(S): 500 TABLET, DELAYED RELEASE ORAL at 22:13

## 2018-03-29 RX ADMIN — TAMSULOSIN HYDROCHLORIDE 0.4 MILLIGRAM(S): 0.4 CAPSULE ORAL at 22:13

## 2018-03-29 RX ADMIN — Medication 100 MILLIGRAM(S): at 18:03

## 2018-03-29 RX ADMIN — PANTOPRAZOLE SODIUM 40 MILLIGRAM(S): 20 TABLET, DELAYED RELEASE ORAL at 05:47

## 2018-03-29 RX ADMIN — Medication 108 GRAM(S): at 05:32

## 2018-03-29 NOTE — PROGRESS NOTE ADULT - ASSESSMENT
59 y/o M with hx of Cerebral Palsy, Seizure Disorder, External Hemorrhoids (2013), Complete Heart Block s/p PPM (2/2018).     Impression:  1) Emesis - likely secondary to combination of antibiotics and dysmotility secondary to recent infection.  Hyponatremia may be also playing a role.  No evidence of SBO, Ileus or Gastric Outlet Obstruction on CT imaging. Ddx includes acute gastroenteritis, rumination syndrome.    Plan:  - Consider trial of Protonix in AM for 2-4 weeks.  - Continue treatment of infection  - Aspiration precautions  - No urgent plan for EGD  - May d/c to home if tolerating diet  - If symptoms persist on discharge, may consider EGD to evaluate UGI tract once acute illness resolves as outpatient 57 y/o M with hx of Cerebral Palsy, Seizure Disorder, External Hemorrhoids (2013), Complete Heart Block s/p PPM (2/2018).     Impression:  1) Emesis - likely secondary to combination of antibiotics and dysmotility secondary to recent infection.  Hyponatremia may be also playing a role.  No evidence of SBO, Ileus or Gastric Outlet Obstruction on CT imaging. Ddx includes acute gastroenteritis, rumination syndrome.    Plan:  - Consider trial of Protonix in AM for 2-4 weeks.  - Continue treatment of infection  - Aspiration precautions  - No urgent plan for EGD  - May d/c to home if tolerating diet

## 2018-03-29 NOTE — CHART NOTE - NSCHARTNOTEFT_GEN_A_CORE
I called [facility] nurse Solo and updated him on pt's hospital course and being ready for discharge. I spoke to director Orquidea and updated her as well, and spoke to SW at [facility] who stated they will plan for pre-discharge meeting on Monday. I explained that pt is ready for discharge and that staying in the hospital for that duration unnecessarily places him at risk for hospital acquired infections, delirium, DVT, and falls. SW here to dw SW at [facility] and try to expedite this meeting for discharge.

## 2018-03-29 NOTE — PROGRESS NOTE ADULT - SUBJECTIVE AND OBJECTIVE BOX
NEPHROLOGY-[facility] [phone]        Patient seen and examined in bed.  He was in good spirits and offered no complaints        MEDICATIONS  (STANDING):  diVALproex  milliGRAM(s) Oral <User Schedule>  diVALproex ER 1000 milliGRAM(s) Oral at bedtime  docusate sodium 100 milliGRAM(s) Oral two times a day  enoxaparin Injectable 40 milliGRAM(s) SubCutaneous every 24 hours  lacosamide 100 milliGRAM(s) Oral every 12 hours  lactobacillus acidophilus 1 Tablet(s) Oral two times a day with meals  lamoTRIgine 200 milliGRAM(s) Oral every 12 hours  lamoTRIgine 50 milliGRAM(s) Oral every 12 hours  levETIRAcetam 750 milliGRAM(s) Oral two times a day  pantoprazole    Tablet 40 milliGRAM(s) Oral before breakfast  polyethylene glycol 3350 17 Gram(s) Oral daily  sodium chloride 0.9%. 1000 milliLiter(s) (70 mL/Hr) IV Continuous <Continuous>  tamsulosin 0.4 milliGRAM(s) Oral at bedtime      VITAL:  T(C): , Max: 36.7 (03-29-18 @ 05:23)  T(F): , Max: 98 (03-29-18 @ 05:23)  HR: 80 (03-29-18 @ 12:26)  BP: 119/80 (03-29-18 @ 12:26)  BP(mean): --  RR: 18 (03-29-18 @ 12:26)  SpO2: 99% (03-29-18 @ 12:26)  Wt(kg): --    I and O's:    03-28 @ 07:01  -  03-29 @ 07:00  --------------------------------------------------------  IN: 650 mL / OUT: 650 mL / NET: 0 mL    03-29 @ 07:01  -  03-29 @ 13:29  --------------------------------------------------------  IN: 540 mL / OUT: 300 mL / NET: 240 mL          PHYSICAL EXAM:    Constitutional: NAD  HEENT: PERRLA    Neck:  No JVD  Respiratory: CTAB/L  Cardiovascular: S1 and S2  Gastrointestinal: BS+, soft, NT/ND  Extremities: No peripheral edema  Neurological: A/O x 3, no focal deficits  Psychiatric: Normal mood, normal affect  : No Heath  Skin: No rashes  Access: Not applicable    LABS:    03-29    133<L>  |  94<L>  |  7   ----------------------------<  83  4.2   |  30  |  0.74    Ca    8.8      29 Mar 2018 05:45  Phos  2.9     03-28  Mg     1.8     03-28            Urine Studies:          RADIOLOGY & ADDITIONAL STUDIES:

## 2018-03-29 NOTE — PROGRESS NOTE ADULT - SUBJECTIVE AND OBJECTIVE BOX
Patient is a 58y old  Male who presents with a chief complaint of Abdominal pain (27 Mar 2018 06:30)      SUBJECTIVE / OVERNIGHT EVENTS: had 1 episode of N/V yesterday, ate and tolerated breakfast thus far without further n/v.     ROS:  No HA/DZ  No Vision changes   No CP, SOB  No Edema  No Rash  NO weakness, numbness    MEDICATIONS  (STANDING):  diVALproex  milliGRAM(s) Oral <User Schedule>  diVALproex ER 1000 milliGRAM(s) Oral at bedtime  docusate sodium 100 milliGRAM(s) Oral two times a day  enoxaparin Injectable 40 milliGRAM(s) SubCutaneous every 24 hours  lacosamide 100 milliGRAM(s) Oral every 12 hours  lactobacillus acidophilus 1 Tablet(s) Oral two times a day with meals  lamoTRIgine 200 milliGRAM(s) Oral every 12 hours  lamoTRIgine 50 milliGRAM(s) Oral every 12 hours  levETIRAcetam 750 milliGRAM(s) Oral two times a day  pantoprazole    Tablet 40 milliGRAM(s) Oral before breakfast  polyethylene glycol 3350 17 Gram(s) Oral daily  sodium chloride 0.9%. 1000 milliLiter(s) (70 mL/Hr) IV Continuous <Continuous>  tamsulosin 0.4 milliGRAM(s) Oral at bedtime    MEDICATIONS  (PRN):  ondansetron Injectable 4 milliGRAM(s) IV Push every 8 hours PRN Nausea and/or Vomiting      T(C): 36.4 (03-29-18 @ 10:42)  HR: 83 (03-29-18 @ 10:42)  BP: 109/68 (03-29-18 @ 10:42)  RR: 18 (03-29-18 @ 10:42)  SpO2: 100% (03-29-18 @ 10:42)  CAPILLARY BLOOD GLUCOSE        I&O's Summary    28 Mar 2018 07:01  -  29 Mar 2018 07:00  --------------------------------------------------------  IN: 650 mL / OUT: 650 mL / NET: 0 mL        PHYSICAL EXAM:  GENERAL: NAD, well-developed, AOx3  HEAD:  Atraumatic, Normocephalic  EYES: EOMI, PERRL, conjunctiva and sclera clear  NECK: Supple, No JVD  CHEST/LUNG: Clear to auscultation bilaterally  HEART: Regular rate and rhythm; No murmurs, rubs, or gallops, No Edema  ABDOMEN: Soft, Nontender, Nondistended; Bowel sounds present  EXTREMITIES:  2+ Peripheral Pulses, No clubbing, cyanosis  PSYCH: No SI/HI  NEUROLOGY: non-focal  SKIN: No rashes or lesions    LABS:    03-29    133<L>  |  94<L>  |  7   ----------------------------<  83  4.2   |  30  |  0.74    Ca    8.8      29 Mar 2018 05:45  Phos  2.9     03-28  Mg     1.8     03-28                    RADIOLOGY & ADDITIONAL TESTS:    Imaging Personally Reviewed:    Consultant(s) Notes Reviewed:      Care Discussed with Consultants/Other Providers: Dr Calderón - renal, GI - Dr Mccartney

## 2018-03-29 NOTE — PROGRESS NOTE ADULT - SUBJECTIVE AND OBJECTIVE BOX
SUBJECTIVE:  - Pt had an episode of emesis overnight  - This morning, he tolerated half of his pureed diet  - No abdominal pain  - No melena, hematemesis or hematochezia    OBJECTIVE:    Vital Signs Last 24 Hrs  T(C): 36.4 (29 Mar 2018 12:26), Max: 36.7 (29 Mar 2018 05:23)  T(F): 97.6 (29 Mar 2018 12:26), Max: 98 (29 Mar 2018 05:23)  HR: 80 (29 Mar 2018 12:26) (80 - 83)  BP: 119/80 (29 Mar 2018 12:26) (109/68 - 177/91)  BP(mean): --  RR: 18 (29 Mar 2018 12:26) (18 - 18)  SpO2: 99% (29 Mar 2018 12:26) (99% - 100%)    PHYSICAL EXAM:  Constitutional: no acute distress  Eyes: no icterus  Neck: no masses, no LAD  Respiratory: normal inspiratory effort; no wheezing or crackles  Cardiovascular: RRR, normal S1/S2, no murmurs/rubs/gallops  Gastrointestinal: soft, nondistended, nontender, +BS  Extremities: no LE edema  Neurological: AAOx3, no asterixis  Skin: no rashes, bruises, petechiae    LABS:    133<L>  |  94<L>  |  7   ----------------------------<  83  4.2   |  30  |  0.74    Ca    8.8      29 Mar 2018 05:45  Phos  2.9     03-28  Mg     1.8     03-28

## 2018-03-29 NOTE — PROGRESS NOTE ADULT - ASSESSMENT
The patient is a katelynn 58-year-old gentleman with history of mental retardation, cerebral palsy, hypertension, seizure disorder, who presents for urinary retention and urinary tract infection.  Course complicated by nausea and vomiting and now has hyponatremia.   This is not syndrome of inappropriate antidiuretic hormone.     Goals of therapy are supportive in nature.      1.	Gastrointestinal.  Try to encourage protein intake.  Ensure with his meals.  2.	Renal.  DC IVF and this is not syndrome of inappropriate antidiuretic hormone.    3.	Neurology.  Continue with seizure medications.

## 2018-03-29 NOTE — PROGRESS NOTE ADULT - PROBLEM SELECTOR PLAN 1
post prandial only. no diarrhea or constipation, Abd soft and non-distended, recent CT a/p without acute pathology. no prior hx of dysphagia, GERD, PUD, or recurrent n/v. appreciate GI - trial of PPI. no urgent EGD. I suspect abx intolerance being its the only new regimen for him, course of which has been completed. will monitor further today, if no further n/v DC planning with out-pt GI follow up post prandial only. no diarrhea or constipation, Abd soft and non-distended, recent CT a/p without acute pathology. no prior hx of dysphagia, GERD, PUD, or recurrent n/v. appreciate GI - trial of PPI. no urgent EGD. I suspect abx intolerance being its the only new regimen for him, course of which has been completed. will monitor further today, if no further n/v DC planning with out-pt GI follow up  dysphagia - s/p swallow eval - on puree with thin fluids

## 2018-03-30 LAB
BUN SERPL-MCNC: 12 MG/DL — SIGNIFICANT CHANGE UP (ref 7–23)
CALCIUM SERPL-MCNC: 9 MG/DL — SIGNIFICANT CHANGE UP (ref 8.4–10.5)
CHLORIDE SERPL-SCNC: 93 MMOL/L — LOW (ref 98–107)
CO2 SERPL-SCNC: 27 MMOL/L — SIGNIFICANT CHANGE UP (ref 22–31)
CREAT SERPL-MCNC: 0.85 MG/DL — SIGNIFICANT CHANGE UP (ref 0.5–1.3)
GLUCOSE SERPL-MCNC: 83 MG/DL — SIGNIFICANT CHANGE UP (ref 70–99)
POTASSIUM SERPL-MCNC: 4 MMOL/L — SIGNIFICANT CHANGE UP (ref 3.5–5.3)
POTASSIUM SERPL-SCNC: 4 MMOL/L — SIGNIFICANT CHANGE UP (ref 3.5–5.3)
SODIUM SERPL-SCNC: 131 MMOL/L — LOW (ref 135–145)

## 2018-03-30 PROCEDURE — 99232 SBSQ HOSP IP/OBS MODERATE 35: CPT | Mod: GC

## 2018-03-30 PROCEDURE — 99233 SBSQ HOSP IP/OBS HIGH 50: CPT

## 2018-03-30 RX ORDER — SODIUM CHLORIDE 9 MG/ML
1000 INJECTION INTRAMUSCULAR; INTRAVENOUS; SUBCUTANEOUS
Qty: 0 | Refills: 0 | Status: DISCONTINUED | OUTPATIENT
Start: 2018-03-30 | End: 2018-04-01

## 2018-03-30 RX ORDER — METOCLOPRAMIDE HCL 10 MG
10 TABLET ORAL EVERY 8 HOURS
Qty: 0 | Refills: 0 | Status: DISCONTINUED | OUTPATIENT
Start: 2018-03-30 | End: 2018-03-31

## 2018-03-30 RX ADMIN — PANTOPRAZOLE SODIUM 40 MILLIGRAM(S): 20 TABLET, DELAYED RELEASE ORAL at 05:50

## 2018-03-30 RX ADMIN — Medication 1 TABLET(S): at 16:35

## 2018-03-30 RX ADMIN — Medication 100 MILLIGRAM(S): at 16:36

## 2018-03-30 RX ADMIN — Medication 10 MILLIGRAM(S): at 12:23

## 2018-03-30 RX ADMIN — ENOXAPARIN SODIUM 40 MILLIGRAM(S): 100 INJECTION SUBCUTANEOUS at 12:23

## 2018-03-30 RX ADMIN — LAMOTRIGINE 50 MILLIGRAM(S): 25 TABLET, ORALLY DISINTEGRATING ORAL at 05:51

## 2018-03-30 RX ADMIN — DIVALPROEX SODIUM 1000 MILLIGRAM(S): 500 TABLET, DELAYED RELEASE ORAL at 22:03

## 2018-03-30 RX ADMIN — LAMOTRIGINE 200 MILLIGRAM(S): 25 TABLET, ORALLY DISINTEGRATING ORAL at 05:51

## 2018-03-30 RX ADMIN — LEVETIRACETAM 750 MILLIGRAM(S): 250 TABLET, FILM COATED ORAL at 05:51

## 2018-03-30 RX ADMIN — Medication 100 MILLIGRAM(S): at 05:51

## 2018-03-30 RX ADMIN — LEVETIRACETAM 750 MILLIGRAM(S): 250 TABLET, FILM COATED ORAL at 16:35

## 2018-03-30 RX ADMIN — ONDANSETRON 4 MILLIGRAM(S): 8 TABLET, FILM COATED ORAL at 18:41

## 2018-03-30 RX ADMIN — DIVALPROEX SODIUM 500 MILLIGRAM(S): 500 TABLET, DELAYED RELEASE ORAL at 05:52

## 2018-03-30 RX ADMIN — TAMSULOSIN HYDROCHLORIDE 0.4 MILLIGRAM(S): 0.4 CAPSULE ORAL at 22:03

## 2018-03-30 RX ADMIN — LAMOTRIGINE 200 MILLIGRAM(S): 25 TABLET, ORALLY DISINTEGRATING ORAL at 16:36

## 2018-03-30 RX ADMIN — LACOSAMIDE 100 MILLIGRAM(S): 50 TABLET ORAL at 05:51

## 2018-03-30 RX ADMIN — LAMOTRIGINE 50 MILLIGRAM(S): 25 TABLET, ORALLY DISINTEGRATING ORAL at 16:36

## 2018-03-30 RX ADMIN — Medication 10 MILLIGRAM(S): at 22:03

## 2018-03-30 RX ADMIN — LACOSAMIDE 100 MILLIGRAM(S): 50 TABLET ORAL at 16:36

## 2018-03-30 RX ADMIN — SODIUM CHLORIDE 70 MILLILITER(S): 9 INJECTION INTRAMUSCULAR; INTRAVENOUS; SUBCUTANEOUS at 12:23

## 2018-03-30 NOTE — PROGRESS NOTE ADULT - PROBLEM SELECTOR PLAN 1
post prandial only. no diarrhea or constipation, Abd soft and non-distended, recent CT a/p without acute pathology. no prior hx of dysphagia, GERD, PUD, or recurrent n/v. per staff, vomiting seems to be induced by cough and gag. already had swallow eval and started on puree. difficult to ascertain symptoms on swallowing due to underlying MR. on PPI, will add standing Reglan. I yevgeniy.w GI fellow further today, might consider EGD for Monday, awaiting d.w attending.

## 2018-03-30 NOTE — PROGRESS NOTE ADULT - SUBJECTIVE AND OBJECTIVE BOX
Patient is a 58y old  Male who presents with a chief complaint of Abdominal pain (27 Mar 2018 06:30)      SUBJECTIVE / OVERNIGHT EVENTS: had an episode of vomiting this morning after breakfast. per nursing and aid at bedside, vomiting seems induced and not spontaneous pt seems to cough, gag, and then vomits.     ROS:  No HA/DZ  No Vision changes   No CP, SOB  No Edema  No Rash  NO weakness, numbness    MEDICATIONS  (STANDING):  diVALproex  milliGRAM(s) Oral <User Schedule>  diVALproex ER 1000 milliGRAM(s) Oral at bedtime  docusate sodium 100 milliGRAM(s) Oral two times a day  enoxaparin Injectable 40 milliGRAM(s) SubCutaneous every 24 hours  lacosamide 100 milliGRAM(s) Oral every 12 hours  lactobacillus acidophilus 1 Tablet(s) Oral two times a day with meals  lamoTRIgine 200 milliGRAM(s) Oral every 12 hours  lamoTRIgine 50 milliGRAM(s) Oral every 12 hours  levETIRAcetam 750 milliGRAM(s) Oral two times a day  pantoprazole    Tablet 40 milliGRAM(s) Oral before breakfast  polyethylene glycol 3350 17 Gram(s) Oral daily  tamsulosin 0.4 milliGRAM(s) Oral at bedtime    MEDICATIONS  (PRN):  ondansetron Injectable 4 milliGRAM(s) IV Push every 8 hours PRN Nausea and/or Vomiting      T(C): 36.7 (03-30-18 @ 04:31)  HR: 75 (03-30-18 @ 04:31)  BP: 149/86 (03-30-18 @ 04:31)  RR: 18 (03-30-18 @ 04:31)  SpO2: 98% (03-30-18 @ 04:31)  CAPILLARY BLOOD GLUCOSE        I&O's Summary    29 Mar 2018 07:01  -  30 Mar 2018 07:00  --------------------------------------------------------  IN: 1180 mL / OUT: 750 mL / NET: 430 mL        PHYSICAL EXAM:  GENERAL: NAD, well-developed, AOx1  HEAD:  Atraumatic, Normocephalic  EYES: EOMI, PERRL, conjunctiva and sclera clear  NECK: Supple, No JVD  CHEST/LUNG: Clear to auscultation bilaterally  HEART: Regular rate and rhythm; No murmurs, rubs, or gallops, No Edema  ABDOMEN: Soft, Nontender, Nondistended; Bowel sounds present  EXTREMITIES:  2+ Peripheral Pulses, No clubbing, cyanosis  PSYCH: No SI/HI  NEUROLOGY: non-focal  SKIN: No rashes or lesions    LABS:    03-30    131<L>  |  93<L>  |  12  ----------------------------<  83  4.0   |  27  |  0.85    Ca    9.0      30 Mar 2018 06:23                    RADIOLOGY & ADDITIONAL TESTS:    Imaging Personally Reviewed:    Consultant(s) Notes Reviewed:      Care Discussed with Consultants/Other Providers: GI fellow - Dr Eugene

## 2018-03-30 NOTE — PROGRESS NOTE ADULT - SUBJECTIVE AND OBJECTIVE BOX
SUBJECTIVE:  - Pt continues to have emesis  - He tolerated a small amount of nectar consistency shake, but had emesis following intake of cereal   - No abdominal pain  - No fever or chills    OBJECTIVE:    Vital Signs Last 24 Hrs  T(C): 36.7 (30 Mar 2018 04:31), Max: 36.7 (30 Mar 2018 04:31)  T(F): 98.1 (30 Mar 2018 04:31), Max: 98.1 (30 Mar 2018 04:31)  HR: 75 (30 Mar 2018 04:31) (75 - 80)  BP: 149/86 (30 Mar 2018 04:31) (119/80 - 151/86)  BP(mean): --  RR: 18 (30 Mar 2018 04:31) (16 - 18)  SpO2: 98% (30 Mar 2018 04:31) (98% - 100%)    PHYSICAL EXAM:  Constitutional: no acute distress  Eyes: no icterus  Neck: no masses, no LAD  Respiratory: normal inspiratory effort; no wheezing or crackles  Cardiovascular: RRR, normal S1/S2, no murmurs/rubs/gallops  Gastrointestinal: soft, nondistended, nontender, +BS  Extremities: no LE edema  Neurological: AAOx1, no asterixis  Skin: no rashes, bruises, petechiae    LABS:    131<L>  |  93<L>  |  12  ----------------------------<  83  4.0   |  27  |  0.85  Ca    9.0      30 Mar 2018 06:23

## 2018-03-30 NOTE — PROGRESS NOTE ADULT - ASSESSMENT
59 y/o M with hx of Cerebral Palsy, Seizure Disorder, External Hemorrhoids (2013), Complete Heart Block s/p PPM (2/2018).     Impression:  1) Emesis - likely secondary to combination of antibiotics and dysmotility secondary to recent infection. Hyponatremia may be also playing a role.  No evidence of SBO, Ileus or Gastric Outlet Obstruction on CT imaging. Ddx also includes rumination syndrome. Pt continues to have intermittent emesis.    Plan:  - Consider trial of Protonix in AM for 2-4 weeks  - Diet as tolerated  - Plan for inpatient EGD tentatively on Monday (unable to perform today due to oral intake this morning given risks of anesthesia)  - Aspiration precautions  - Monitor electrolytes

## 2018-03-31 LAB
BUN SERPL-MCNC: 9 MG/DL — SIGNIFICANT CHANGE UP (ref 7–23)
CALCIUM SERPL-MCNC: 8.7 MG/DL — SIGNIFICANT CHANGE UP (ref 8.4–10.5)
CHLORIDE SERPL-SCNC: 92 MMOL/L — LOW (ref 98–107)
CO2 SERPL-SCNC: 29 MMOL/L — SIGNIFICANT CHANGE UP (ref 22–31)
CREAT SERPL-MCNC: 0.73 MG/DL — SIGNIFICANT CHANGE UP (ref 0.5–1.3)
GLUCOSE SERPL-MCNC: 77 MG/DL — SIGNIFICANT CHANGE UP (ref 70–99)
HCT VFR BLD CALC: 35.3 % — LOW (ref 39–50)
HGB BLD-MCNC: 11.9 G/DL — LOW (ref 13–17)
MCHC RBC-ENTMCNC: 32.6 PG — SIGNIFICANT CHANGE UP (ref 27–34)
MCHC RBC-ENTMCNC: 33.7 % — SIGNIFICANT CHANGE UP (ref 32–36)
MCV RBC AUTO: 96.7 FL — SIGNIFICANT CHANGE UP (ref 80–100)
NRBC # FLD: 0 — SIGNIFICANT CHANGE UP
PLATELET # BLD AUTO: 328 K/UL — SIGNIFICANT CHANGE UP (ref 150–400)
PMV BLD: 9.6 FL — SIGNIFICANT CHANGE UP (ref 7–13)
POTASSIUM SERPL-MCNC: 4.2 MMOL/L — SIGNIFICANT CHANGE UP (ref 3.5–5.3)
POTASSIUM SERPL-SCNC: 4.2 MMOL/L — SIGNIFICANT CHANGE UP (ref 3.5–5.3)
RBC # BLD: 3.65 M/UL — LOW (ref 4.2–5.8)
RBC # FLD: 12.2 % — SIGNIFICANT CHANGE UP (ref 10.3–14.5)
SODIUM SERPL-SCNC: 132 MMOL/L — LOW (ref 135–145)
WBC # BLD: 9.55 K/UL — SIGNIFICANT CHANGE UP (ref 3.8–10.5)
WBC # FLD AUTO: 9.55 K/UL — SIGNIFICANT CHANGE UP (ref 3.8–10.5)

## 2018-03-31 PROCEDURE — 99233 SBSQ HOSP IP/OBS HIGH 50: CPT

## 2018-03-31 RX ORDER — METOCLOPRAMIDE HCL 10 MG
10 TABLET ORAL
Qty: 0 | Refills: 0 | Status: DISCONTINUED | OUTPATIENT
Start: 2018-03-31 | End: 2018-04-04

## 2018-03-31 RX ADMIN — TAMSULOSIN HYDROCHLORIDE 0.4 MILLIGRAM(S): 0.4 CAPSULE ORAL at 22:33

## 2018-03-31 RX ADMIN — ENOXAPARIN SODIUM 40 MILLIGRAM(S): 100 INJECTION SUBCUTANEOUS at 13:23

## 2018-03-31 RX ADMIN — Medication 10 MILLIGRAM(S): at 18:00

## 2018-03-31 RX ADMIN — SODIUM CHLORIDE 70 MILLILITER(S): 9 INJECTION INTRAMUSCULAR; INTRAVENOUS; SUBCUTANEOUS at 01:30

## 2018-03-31 RX ADMIN — LAMOTRIGINE 50 MILLIGRAM(S): 25 TABLET, ORALLY DISINTEGRATING ORAL at 17:11

## 2018-03-31 RX ADMIN — LAMOTRIGINE 200 MILLIGRAM(S): 25 TABLET, ORALLY DISINTEGRATING ORAL at 17:11

## 2018-03-31 RX ADMIN — LAMOTRIGINE 200 MILLIGRAM(S): 25 TABLET, ORALLY DISINTEGRATING ORAL at 05:46

## 2018-03-31 RX ADMIN — POLYETHYLENE GLYCOL 3350 17 GRAM(S): 17 POWDER, FOR SOLUTION ORAL at 13:23

## 2018-03-31 RX ADMIN — Medication 100 MILLIGRAM(S): at 17:10

## 2018-03-31 RX ADMIN — DIVALPROEX SODIUM 500 MILLIGRAM(S): 500 TABLET, DELAYED RELEASE ORAL at 05:46

## 2018-03-31 RX ADMIN — PANTOPRAZOLE SODIUM 40 MILLIGRAM(S): 20 TABLET, DELAYED RELEASE ORAL at 05:46

## 2018-03-31 RX ADMIN — LEVETIRACETAM 750 MILLIGRAM(S): 250 TABLET, FILM COATED ORAL at 05:46

## 2018-03-31 RX ADMIN — LAMOTRIGINE 50 MILLIGRAM(S): 25 TABLET, ORALLY DISINTEGRATING ORAL at 05:46

## 2018-03-31 RX ADMIN — Medication 100 MILLIGRAM(S): at 05:46

## 2018-03-31 RX ADMIN — Medication 10 MILLIGRAM(S): at 05:46

## 2018-03-31 RX ADMIN — LEVETIRACETAM 750 MILLIGRAM(S): 250 TABLET, FILM COATED ORAL at 17:11

## 2018-03-31 RX ADMIN — Medication 1 TABLET(S): at 17:10

## 2018-03-31 RX ADMIN — DIVALPROEX SODIUM 1000 MILLIGRAM(S): 500 TABLET, DELAYED RELEASE ORAL at 22:33

## 2018-03-31 RX ADMIN — Medication 10 MILLIGRAM(S): at 13:23

## 2018-03-31 NOTE — PROGRESS NOTE ADULT - PROBLEM SELECTOR PLAN 1
post prandial only. no diarrhea or constipation, Abd soft and non-distended, recent CT a/p without acute pathology. no prior hx of dysphagia, GERD, PUD, or recurrent n/v. per staff, vomiting seems to be induced by cough and gag. already had swallow eval and started on puree. difficult to ascertain symptoms on swallowing due to underlying MR. on PPI, will add standing Reglan.   Plan for EGD on Mon

## 2018-03-31 NOTE — PROGRESS NOTE ADULT - SUBJECTIVE AND OBJECTIVE BOX
Patient is a 58y old  Male who presents with a chief complaint of Abdominal pain (27 Mar 2018 06:30)      SUBJECTIVE / OVERNIGHT EVENTS:  Pt is sleepy, but easily aroused. answering questions with clear speech. denied any complaints at this time     MEDICATIONS  (STANDING):  diVALproex  milliGRAM(s) Oral <User Schedule>  diVALproex ER 1000 milliGRAM(s) Oral at bedtime  docusate sodium 100 milliGRAM(s) Oral two times a day  enoxaparin Injectable 40 milliGRAM(s) SubCutaneous every 24 hours  lactobacillus acidophilus 1 Tablet(s) Oral two times a day with meals  lamoTRIgine 200 milliGRAM(s) Oral every 12 hours  lamoTRIgine 50 milliGRAM(s) Oral every 12 hours  levETIRAcetam 750 milliGRAM(s) Oral two times a day  metoclopramide Injectable 10 milliGRAM(s) IV Push every 8 hours  pantoprazole    Tablet 40 milliGRAM(s) Oral before breakfast  polyethylene glycol 3350 17 Gram(s) Oral daily  sodium chloride 0.9%. 1000 milliLiter(s) (70 mL/Hr) IV Continuous <Continuous>  tamsulosin 0.4 milliGRAM(s) Oral at bedtime    MEDICATIONS  (PRN):  ondansetron Injectable 4 milliGRAM(s) IV Push every 8 hours PRN Nausea and/or Vomiting      T(C): 36.9 (03-31-18 @ 13:29), Max: 36.9 (03-31-18 @ 13:29)  HR: 97 (03-31-18 @ 13:29) (74 - 99)  BP: 145/76 (03-31-18 @ 13:29) (145/76 - 171/99)  RR: 18 (03-31-18 @ 13:29) (16 - 18)  SpO2: 96% (03-31-18 @ 13:29) (96% - 99%)  CAPILLARY BLOOD GLUCOSE        I&O's Summary    30 Mar 2018 07:01  -  31 Mar 2018 07:00  --------------------------------------------------------  IN: 200 mL / OUT: 0 mL / NET: 200 mL        PHYSICAL EXAM:  GENERAL: NAD, well-developed  HEAD:  Atraumatic, Normocephalic  NECK: Supple, No JVD  CHEST/LUNG: Clear to auscultation bilaterally; No wheeze  HEART: s1 s2, regular rhythm and rate   ABDOMEN: Soft, Nontender, Nondistended; Bowel sounds present  EXTREMITIES:  2+ Peripheral Pulses, No clubbing, cyanosis, or edema  PSYCH: calm   NEUROLOGY: sleepy, non-focal  SKIN: No rashes or lesions    LABS:                        11.9   9.55  )-----------( 328      ( 31 Mar 2018 05:24 )             35.3     03-31    132<L>  |  92<L>  |  9   ----------------------------<  77  4.2   |  29  |  0.73    Ca    8.7      31 Mar 2018 05:24                RADIOLOGY & ADDITIONAL TESTS:    Imaging Personally Reviewed:    Consultant(s) Notes Reviewed:      Care Discussed with Consultants/Other Providers:

## 2018-03-31 NOTE — CHART NOTE - NSCHARTNOTEFT_GEN_A_CORE
ADS NIGHT COVERAGE:    Notified by RN that BP was 176/102. Instructed RN to recheck manually, was 165/85.     Will continue to monitor.    BUSHRA Adams ADS PA-C  [id number]

## 2018-04-01 LAB
BUN SERPL-MCNC: 9 MG/DL — SIGNIFICANT CHANGE UP (ref 7–23)
CALCIUM SERPL-MCNC: 8.8 MG/DL — SIGNIFICANT CHANGE UP (ref 8.4–10.5)
CHLORIDE SERPL-SCNC: 91 MMOL/L — LOW (ref 98–107)
CO2 SERPL-SCNC: 30 MMOL/L — SIGNIFICANT CHANGE UP (ref 22–31)
CREAT SERPL-MCNC: 0.73 MG/DL — SIGNIFICANT CHANGE UP (ref 0.5–1.3)
GLUCOSE SERPL-MCNC: 78 MG/DL — SIGNIFICANT CHANGE UP (ref 70–99)
HCT VFR BLD CALC: 34.5 % — LOW (ref 39–50)
HGB BLD-MCNC: 11.7 G/DL — LOW (ref 13–17)
MCHC RBC-ENTMCNC: 32.3 PG — SIGNIFICANT CHANGE UP (ref 27–34)
MCHC RBC-ENTMCNC: 33.9 % — SIGNIFICANT CHANGE UP (ref 32–36)
MCV RBC AUTO: 95.3 FL — SIGNIFICANT CHANGE UP (ref 80–100)
NRBC # FLD: 0 — SIGNIFICANT CHANGE UP
PLATELET # BLD AUTO: 339 K/UL — SIGNIFICANT CHANGE UP (ref 150–400)
PMV BLD: 9.5 FL — SIGNIFICANT CHANGE UP (ref 7–13)
POTASSIUM SERPL-MCNC: 3.9 MMOL/L — SIGNIFICANT CHANGE UP (ref 3.5–5.3)
POTASSIUM SERPL-SCNC: 3.9 MMOL/L — SIGNIFICANT CHANGE UP (ref 3.5–5.3)
RBC # BLD: 3.62 M/UL — LOW (ref 4.2–5.8)
RBC # FLD: 12.3 % — SIGNIFICANT CHANGE UP (ref 10.3–14.5)
SODIUM SERPL-SCNC: 131 MMOL/L — LOW (ref 135–145)
WBC # BLD: 9.1 K/UL — SIGNIFICANT CHANGE UP (ref 3.8–10.5)
WBC # FLD AUTO: 9.1 K/UL — SIGNIFICANT CHANGE UP (ref 3.8–10.5)

## 2018-04-01 PROCEDURE — 99233 SBSQ HOSP IP/OBS HIGH 50: CPT

## 2018-04-01 RX ORDER — VALPROIC ACID (AS SODIUM SALT) 250 MG/5ML
1000 SOLUTION, ORAL ORAL AT BEDTIME
Qty: 0 | Refills: 0 | Status: DISCONTINUED | OUTPATIENT
Start: 2018-04-01 | End: 2018-04-04

## 2018-04-01 RX ORDER — VALPROIC ACID (AS SODIUM SALT) 250 MG/5ML
500 SOLUTION, ORAL ORAL DAILY
Qty: 0 | Refills: 0 | Status: DISCONTINUED | OUTPATIENT
Start: 2018-04-01 | End: 2018-04-02

## 2018-04-01 RX ORDER — ONDANSETRON 8 MG/1
4 TABLET, FILM COATED ORAL ONCE
Qty: 0 | Refills: 0 | Status: COMPLETED | OUTPATIENT
Start: 2018-04-01 | End: 2018-04-01

## 2018-04-01 RX ADMIN — Medication 10 MILLIGRAM(S): at 06:23

## 2018-04-01 RX ADMIN — LAMOTRIGINE 50 MILLIGRAM(S): 25 TABLET, ORALLY DISINTEGRATING ORAL at 18:00

## 2018-04-01 RX ADMIN — Medication 1 TABLET(S): at 17:59

## 2018-04-01 RX ADMIN — Medication 10 MILLIGRAM(S): at 18:00

## 2018-04-01 RX ADMIN — DIVALPROEX SODIUM 500 MILLIGRAM(S): 500 TABLET, DELAYED RELEASE ORAL at 07:24

## 2018-04-01 RX ADMIN — Medication 100 MILLIGRAM(S): at 06:16

## 2018-04-01 RX ADMIN — LAMOTRIGINE 200 MILLIGRAM(S): 25 TABLET, ORALLY DISINTEGRATING ORAL at 17:59

## 2018-04-01 RX ADMIN — PANTOPRAZOLE SODIUM 40 MILLIGRAM(S): 20 TABLET, DELAYED RELEASE ORAL at 06:23

## 2018-04-01 RX ADMIN — Medication 100 MILLIGRAM(S): at 18:00

## 2018-04-01 RX ADMIN — Medication 10 MILLIGRAM(S): at 12:53

## 2018-04-01 RX ADMIN — LAMOTRIGINE 200 MILLIGRAM(S): 25 TABLET, ORALLY DISINTEGRATING ORAL at 07:24

## 2018-04-01 RX ADMIN — Medication 1 TABLET(S): at 07:25

## 2018-04-01 RX ADMIN — ENOXAPARIN SODIUM 40 MILLIGRAM(S): 100 INJECTION SUBCUTANEOUS at 12:53

## 2018-04-01 RX ADMIN — LEVETIRACETAM 750 MILLIGRAM(S): 250 TABLET, FILM COATED ORAL at 06:17

## 2018-04-01 RX ADMIN — LEVETIRACETAM 750 MILLIGRAM(S): 250 TABLET, FILM COATED ORAL at 18:00

## 2018-04-01 RX ADMIN — LAMOTRIGINE 50 MILLIGRAM(S): 25 TABLET, ORALLY DISINTEGRATING ORAL at 06:34

## 2018-04-01 RX ADMIN — TAMSULOSIN HYDROCHLORIDE 0.4 MILLIGRAM(S): 0.4 CAPSULE ORAL at 21:54

## 2018-04-01 RX ADMIN — ONDANSETRON 4 MILLIGRAM(S): 8 TABLET, FILM COATED ORAL at 23:39

## 2018-04-01 RX ADMIN — ONDANSETRON 4 MILLIGRAM(S): 8 TABLET, FILM COATED ORAL at 15:57

## 2018-04-01 NOTE — CHART NOTE - NSCHARTNOTEFT_GEN_A_CORE
ADS NIGHT COVERAGE:    Notified by RN that pt states he has difficulty swallowing Depakote ER pills. Called pharmacy - said that to change from tab to Valproic Acid syrup is 1:1 conversion, recommended to keep same dosages for AM and and bedtime.     Will continue to monitor.  BUSHRA Adams ADS PA-C  [id number]

## 2018-04-01 NOTE — CHART NOTE - NSCHARTNOTEFT_GEN_A_CORE
GI CHART NOTE    Plan for EGD tomorrow.    Please make patient NPO past midnight.    Remainder of plan per previous GI note.

## 2018-04-01 NOTE — PROGRESS NOTE ADULT - PROBLEM SELECTOR PLAN 1
post prandial only. no diarrhea or constipation, Abd soft and non-distended, recent CT a/p without acute pathology. no prior hx of dysphagia, GERD, PUD, or recurrent n/v. per staff, vomiting seems to be induced by cough and gag. already had swallow eval and started on puree. difficult to ascertain symptoms on swallowing due to underlying MR. on PPI, will add standing Reglan.   Plan for EGD on Mon. NPO past MN

## 2018-04-01 NOTE — PROGRESS NOTE ADULT - SUBJECTIVE AND OBJECTIVE BOX
Patient is a 58y old  Male who presents with a chief complaint of Abdominal pain (27 Mar 2018 06:30)      SUBJECTIVE / OVERNIGHT EVENTS:  Pt denied complaints. sitting in chair watching video     MEDICATIONS  (STANDING):  diVALproex  milliGRAM(s) Oral <User Schedule>  diVALproex ER 1000 milliGRAM(s) Oral at bedtime  docusate sodium 100 milliGRAM(s) Oral two times a day  enoxaparin Injectable 40 milliGRAM(s) SubCutaneous every 24 hours  lactobacillus acidophilus 1 Tablet(s) Oral two times a day with meals  lamoTRIgine 200 milliGRAM(s) Oral every 12 hours  lamoTRIgine 50 milliGRAM(s) Oral every 12 hours  levETIRAcetam 750 milliGRAM(s) Oral two times a day  metoclopramide Injectable 10 milliGRAM(s) IV Push <User Schedule>  pantoprazole    Tablet 40 milliGRAM(s) Oral before breakfast  polyethylene glycol 3350 17 Gram(s) Oral daily  tamsulosin 0.4 milliGRAM(s) Oral at bedtime    MEDICATIONS  (PRN):  ondansetron Injectable 4 milliGRAM(s) IV Push every 8 hours PRN Nausea and/or Vomiting      T(C): 36.4 (04-01-18 @ 05:53), Max: 36.9 (03-31-18 @ 13:29)  HR: 80 (04-01-18 @ 05:53) (80 - 97)  BP: 154/91 (04-01-18 @ 05:53) (145/76 - 176/102)  RR: 18 (04-01-18 @ 05:53) (17 - 18)  SpO2: 96% (04-01-18 @ 05:53) (96% - 97%)  CAPILLARY BLOOD GLUCOSE        I&O's Summary      PHYSICAL EXAM:  GENERAL: NAD, well-developed  HEAD:  Atraumatic, Normocephalic  EYES: EOMI, PERRLA, conjunctiva and sclera clear  NECK: Supple, No JVD  CHEST/LUNG: Clear to auscultation bilaterally; No wheeze  HEART: s1 s2, regular rhythm and rate   ABDOMEN: Soft, Nontender, Nondistended; Bowel sounds present  EXTREMITIES:  2+ Peripheral Pulses, No clubbing, cyanosis, or edema  PSYCH: awake, alert, calm   NEUROLOGY: non-focal  SKIN: No rashes or lesions    LABS:                        11.7   9.10  )-----------( 339      ( 01 Apr 2018 05:41 )             34.5     04-01    131<L>  |  91<L>  |  9   ----------------------------<  78  3.9   |  30  |  0.73    Ca    8.8      01 Apr 2018 05:41                RADIOLOGY & ADDITIONAL TESTS:    Imaging Personally Reviewed:    Consultant(s) Notes Reviewed:      Care Discussed with Consultants/Other Providers:

## 2018-04-02 ENCOUNTER — RESULT REVIEW (OUTPATIENT)
Age: 58
End: 2018-04-02

## 2018-04-02 LAB
BUN SERPL-MCNC: 12 MG/DL — SIGNIFICANT CHANGE UP (ref 7–23)
CALCIUM SERPL-MCNC: 8.8 MG/DL — SIGNIFICANT CHANGE UP (ref 8.4–10.5)
CHLORIDE SERPL-SCNC: 88 MMOL/L — LOW (ref 98–107)
CO2 SERPL-SCNC: 31 MMOL/L — SIGNIFICANT CHANGE UP (ref 22–31)
CREAT SERPL-MCNC: 0.73 MG/DL — SIGNIFICANT CHANGE UP (ref 0.5–1.3)
GLUCOSE BLDC GLUCOMTR-MCNC: 93 MG/DL — SIGNIFICANT CHANGE UP (ref 70–99)
GLUCOSE SERPL-MCNC: 85 MG/DL — SIGNIFICANT CHANGE UP (ref 70–99)
POTASSIUM SERPL-MCNC: 4 MMOL/L — SIGNIFICANT CHANGE UP (ref 3.5–5.3)
POTASSIUM SERPL-SCNC: 4 MMOL/L — SIGNIFICANT CHANGE UP (ref 3.5–5.3)
SODIUM SERPL-SCNC: 128 MMOL/L — LOW (ref 135–145)

## 2018-04-02 PROCEDURE — 99233 SBSQ HOSP IP/OBS HIGH 50: CPT

## 2018-04-02 PROCEDURE — 43239 EGD BIOPSY SINGLE/MULTIPLE: CPT | Mod: GC

## 2018-04-02 PROCEDURE — 88305 TISSUE EXAM BY PATHOLOGIST: CPT | Mod: 26,59

## 2018-04-02 PROCEDURE — 88312 SPECIAL STAINS GROUP 1: CPT | Mod: 26

## 2018-04-02 RX ORDER — VALPROIC ACID (AS SODIUM SALT) 250 MG/5ML
500 SOLUTION, ORAL ORAL DAILY
Qty: 0 | Refills: 0 | Status: DISCONTINUED | OUTPATIENT
Start: 2018-04-02 | End: 2018-04-04

## 2018-04-02 RX ORDER — FLUCONAZOLE 150 MG/1
200 TABLET ORAL ONCE
Qty: 0 | Refills: 0 | Status: COMPLETED | OUTPATIENT
Start: 2018-04-02 | End: 2018-04-02

## 2018-04-02 RX ORDER — AMLODIPINE BESYLATE 2.5 MG/1
5 TABLET ORAL DAILY
Qty: 0 | Refills: 0 | Status: DISCONTINUED | OUTPATIENT
Start: 2018-04-02 | End: 2018-04-04

## 2018-04-02 RX ORDER — FLUCONAZOLE 150 MG/1
100 TABLET ORAL DAILY
Qty: 0 | Refills: 0 | Status: DISCONTINUED | OUTPATIENT
Start: 2018-04-03 | End: 2018-04-04

## 2018-04-02 RX ORDER — LEVETIRACETAM 250 MG/1
750 TABLET, FILM COATED ORAL ONCE
Qty: 0 | Refills: 0 | Status: COMPLETED | OUTPATIENT
Start: 2018-04-02 | End: 2018-04-02

## 2018-04-02 RX ORDER — VALPROIC ACID (AS SODIUM SALT) 250 MG/5ML
1000 SOLUTION, ORAL ORAL ONCE
Qty: 0 | Refills: 0 | Status: COMPLETED | OUTPATIENT
Start: 2018-04-02 | End: 2018-04-02

## 2018-04-02 RX ADMIN — FLUCONAZOLE 200 MILLIGRAM(S): 150 TABLET ORAL at 18:22

## 2018-04-02 RX ADMIN — Medication 100 MILLIGRAM(S): at 18:25

## 2018-04-02 RX ADMIN — Medication 10 MILLIGRAM(S): at 05:53

## 2018-04-02 RX ADMIN — Medication 500 MILLIGRAM(S): at 05:52

## 2018-04-02 RX ADMIN — Medication 1000 MILLIGRAM(S): at 22:23

## 2018-04-02 RX ADMIN — TAMSULOSIN HYDROCHLORIDE 0.4 MILLIGRAM(S): 0.4 CAPSULE ORAL at 22:23

## 2018-04-02 RX ADMIN — LEVETIRACETAM 750 MILLIGRAM(S): 250 TABLET, FILM COATED ORAL at 18:22

## 2018-04-02 RX ADMIN — LAMOTRIGINE 50 MILLIGRAM(S): 25 TABLET, ORALLY DISINTEGRATING ORAL at 18:22

## 2018-04-02 RX ADMIN — AMLODIPINE BESYLATE 5 MILLIGRAM(S): 2.5 TABLET ORAL at 18:22

## 2018-04-02 RX ADMIN — LEVETIRACETAM 400 MILLIGRAM(S): 250 TABLET, FILM COATED ORAL at 05:51

## 2018-04-02 RX ADMIN — Medication 10 MILLIGRAM(S): at 17:10

## 2018-04-02 RX ADMIN — Medication 1000 MILLIGRAM(S): at 00:55

## 2018-04-02 RX ADMIN — LAMOTRIGINE 200 MILLIGRAM(S): 25 TABLET, ORALLY DISINTEGRATING ORAL at 18:22

## 2018-04-02 RX ADMIN — Medication 1 TABLET(S): at 18:22

## 2018-04-02 RX ADMIN — ENOXAPARIN SODIUM 40 MILLIGRAM(S): 100 INJECTION SUBCUTANEOUS at 12:27

## 2018-04-02 NOTE — PROGRESS NOTE ADULT - PROBLEM SELECTOR PLAN 4
hypovolemic, improved with IVF, no SIADH as per nephrology, possible 2nd to poor oral intake, N/V, dehydration..  - as Na low again, IVF and monitor BMP

## 2018-04-02 NOTE — CHART NOTE - NSCHARTNOTEFT_GEN_A_CORE
PROCEDURE NOTE:    EGD:  - candida esophagitis in the mid and distal esophagus  - antral gastritis  - normal duodenal bulb and 2nd portion    PLAN:  - soft diet as tolerated  - Fluconazole 200mg po x 1 today, then 100mg po daily x 3 weeks  - Protonix 40mg po daily  - aspiration precautions

## 2018-04-02 NOTE — PROGRESS NOTE ADULT - SUBJECTIVE AND OBJECTIVE BOX
Patient is a 58y old  Male who presents with a chief complaint of Abdominal pain (27 Mar 2018 06:30)      SUBJECTIVE / OVERNIGHT EVENTS:    MEDICATIONS  (STANDING):  docusate sodium 100 milliGRAM(s) Oral two times a day  enoxaparin Injectable 40 milliGRAM(s) SubCutaneous every 24 hours  lactobacillus acidophilus 1 Tablet(s) Oral two times a day with meals  lamoTRIgine 200 milliGRAM(s) Oral every 12 hours  lamoTRIgine 50 milliGRAM(s) Oral every 12 hours  levETIRAcetam 750 milliGRAM(s) Oral two times a day  metoclopramide Injectable 10 milliGRAM(s) IV Push <User Schedule>  pantoprazole    Tablet 40 milliGRAM(s) Oral before breakfast  polyethylene glycol 3350 17 Gram(s) Oral daily  tamsulosin 0.4 milliGRAM(s) Oral at bedtime  valproic  acid Syrup 1000 milliGRAM(s) Oral at bedtime  valproic  acid Syrup 500 milliGRAM(s) Oral daily    MEDICATIONS  (PRN):  ondansetron Injectable 4 milliGRAM(s) IV Push every 8 hours PRN Nausea and/or Vomiting      Vital Signs Last 24 Hrs  T(C): 36.4 (02 Apr 2018 06:32), Max: 36.6 (01 Apr 2018 13:35)  T(F): 97.6 (02 Apr 2018 06:32), Max: 97.8 (01 Apr 2018 13:35)  HR: 89 (02 Apr 2018 06:32) (85 - 94)  BP: 175/85 (02 Apr 2018 06:32) (142/70 - 175/85)  BP(mean): --  RR: 18 (02 Apr 2018 06:32) (17 - 18)  SpO2: 99% (02 Apr 2018 06:32) (96% - 99%)  CAPILLARY BLOOD GLUCOSE      POCT Blood Glucose.: 93 mg/dL (02 Apr 2018 06:45)    I&O's Summary    01 Apr 2018 07:01  -  02 Apr 2018 07:00  --------------------------------------------------------  IN: 0 mL / OUT: 575 mL / NET: -575 mL        PHYSICAL EXAM:  GENERAL: NAD, well-developed  HEAD:  Atraumatic, Normocephalic  EYES: EOMI, PERRLA, conjunctiva and sclera clear  NECK: Supple, No JVD  CHEST/LUNG: Clear to auscultation bilaterally; No wheeze  HEART: Regular rate and rhythm; No murmurs, rubs, or gallops  ABDOMEN: Soft, Nontender, Nondistended; Bowel sounds present  EXTREMITIES:  2+ Peripheral Pulses, No clubbing, cyanosis, or edema  PSYCH: AAOx3  NEUROLOGY: non-focal  SKIN: No rashes or lesions    LABS:                        11.7   9.10  )-----------( 339      ( 01 Apr 2018 05:41 )             34.5     04-02    128<L>  |  88<L>  |  12  ----------------------------<  85  4.0   |  31  |  0.73    Ca    8.8      02 Apr 2018 06:19                    RADIOLOGY & ADDITIONAL TESTS:    Imaging Personally Reviewed:    Consultant(s) Notes Reviewed:      Care Discussed with Consultants/Other Providers: Patient is a 58y old  Male who presents with a chief complaint of Abdominal pain (27 Mar 2018 06:30)      SUBJECTIVE / OVERNIGHT EVENTS: pending EGD today. BP elevated, Pt denied CP, no SOB,     MEDICATIONS  (STANDING):  docusate sodium 100 milliGRAM(s) Oral two times a day  enoxaparin Injectable 40 milliGRAM(s) SubCutaneous every 24 hours  lactobacillus acidophilus 1 Tablet(s) Oral two times a day with meals  lamoTRIgine 200 milliGRAM(s) Oral every 12 hours  lamoTRIgine 50 milliGRAM(s) Oral every 12 hours  levETIRAcetam 750 milliGRAM(s) Oral two times a day  metoclopramide Injectable 10 milliGRAM(s) IV Push <User Schedule>  pantoprazole    Tablet 40 milliGRAM(s) Oral before breakfast  polyethylene glycol 3350 17 Gram(s) Oral daily  tamsulosin 0.4 milliGRAM(s) Oral at bedtime  valproic  acid Syrup 1000 milliGRAM(s) Oral at bedtime  valproic  acid Syrup 500 milliGRAM(s) Oral daily    MEDICATIONS  (PRN):  ondansetron Injectable 4 milliGRAM(s) IV Push every 8 hours PRN Nausea and/or Vomiting      Vital Signs Last 24 Hrs  T(C): 36.4 (02 Apr 2018 06:32), Max: 36.6 (01 Apr 2018 13:35)  T(F): 97.6 (02 Apr 2018 06:32), Max: 97.8 (01 Apr 2018 13:35)  HR: 89 (02 Apr 2018 06:32) (85 - 94)  BP: 175/85 (02 Apr 2018 06:32) (142/70 - 175/85)  BP(mean): --  RR: 18 (02 Apr 2018 06:32) (17 - 18)  SpO2: 99% (02 Apr 2018 06:32) (96% - 99%)  CAPILLARY BLOOD GLUCOSE      POCT Blood Glucose.: 93 mg/dL (02 Apr 2018 06:45)    I&O's Summary    01 Apr 2018 07:01  -  02 Apr 2018 07:00  --------------------------------------------------------  IN: 0 mL / OUT: 575 mL / NET: -575 mL        PHYSICAL EXAM:  GENERAL: NAD,   HEAD:  Atraumatic, Normocephalic  EYES: EOMI, PERRLA, conjunctiva and sclera clear  NECK: Supple, No JVD  CHEST/LUNG: Clear to auscultation bilaterally; No wheeze  HEART: Regular rate and rhythm; No murmurs, rubs, or gallops  ABDOMEN: Soft, Nontender, Nondistended; Bowel sounds present  EXTREMITIES:  2+ Peripheral Pulses, No clubbing, cyanosis, or edema  PSYCH: calm  NEUROLOGY: non-focal  SKIN: No rashes or lesions    LABS:                        11.7   9.10  )-----------( 339      ( 01 Apr 2018 05:41 )             34.5     04-02    128<L>  |  88<L>  |  12  ----------------------------<  85  4.0   |  31  |  0.73    Ca    8.8      02 Apr 2018 06:19                    RADIOLOGY & ADDITIONAL TESTS:    Imaging Personally Reviewed:    Consultant(s) Notes Reviewed:  GI    Care Discussed with Consultants/Other Providers:

## 2018-04-02 NOTE — PROGRESS NOTE ADULT - PROBLEM SELECTOR PLAN 1
post prandial only. no diarrhea or constipation, Abd soft and non-distended, recent CT a/p without acute pathology. no prior hx of dysphagia, GERD, PUD, or recurrent n/v. per staff, vomiting seems to be induced by cough and gag. already had swallow eval and started on puree. difficult to ascertain symptoms on swallowing due to underlying MR. on PPI, will add standing Reglan.   Plan for EGD today.

## 2018-04-03 DIAGNOSIS — B37.81 CANDIDAL ESOPHAGITIS: ICD-10-CM

## 2018-04-03 DIAGNOSIS — K20.9 ESOPHAGITIS, UNSPECIFIED: ICD-10-CM

## 2018-04-03 LAB
BUN SERPL-MCNC: 13 MG/DL — SIGNIFICANT CHANGE UP (ref 7–23)
CALCIUM SERPL-MCNC: 9.2 MG/DL — SIGNIFICANT CHANGE UP (ref 8.4–10.5)
CHLORIDE SERPL-SCNC: 90 MMOL/L — LOW (ref 98–107)
CO2 SERPL-SCNC: 29 MMOL/L — SIGNIFICANT CHANGE UP (ref 22–31)
CREAT SERPL-MCNC: 0.77 MG/DL — SIGNIFICANT CHANGE UP (ref 0.5–1.3)
GLUCOSE SERPL-MCNC: 92 MG/DL — SIGNIFICANT CHANGE UP (ref 70–99)
MAGNESIUM SERPL-MCNC: 1.8 MG/DL — SIGNIFICANT CHANGE UP (ref 1.6–2.6)
PHOSPHATE SERPL-MCNC: 3.1 MG/DL — SIGNIFICANT CHANGE UP (ref 2.5–4.5)
POTASSIUM SERPL-MCNC: 4.1 MMOL/L — SIGNIFICANT CHANGE UP (ref 3.5–5.3)
POTASSIUM SERPL-SCNC: 4.1 MMOL/L — SIGNIFICANT CHANGE UP (ref 3.5–5.3)
SODIUM SERPL-SCNC: 128 MMOL/L — LOW (ref 135–145)

## 2018-04-03 PROCEDURE — 99233 SBSQ HOSP IP/OBS HIGH 50: CPT

## 2018-04-03 RX ORDER — LAMOTRIGINE 25 MG/1
2 TABLET, ORALLY DISINTEGRATING ORAL
Qty: 0 | Refills: 0 | COMMUNITY
Start: 2018-04-03

## 2018-04-03 RX ADMIN — LEVETIRACETAM 750 MILLIGRAM(S): 250 TABLET, FILM COATED ORAL at 05:03

## 2018-04-03 RX ADMIN — Medication 100 MILLIGRAM(S): at 17:55

## 2018-04-03 RX ADMIN — Medication 1000 MILLIGRAM(S): at 21:33

## 2018-04-03 RX ADMIN — Medication 1 TABLET(S): at 17:55

## 2018-04-03 RX ADMIN — Medication 10 MILLIGRAM(S): at 17:55

## 2018-04-03 RX ADMIN — LAMOTRIGINE 200 MILLIGRAM(S): 25 TABLET, ORALLY DISINTEGRATING ORAL at 05:03

## 2018-04-03 RX ADMIN — Medication 500 MILLIGRAM(S): at 13:20

## 2018-04-03 RX ADMIN — TAMSULOSIN HYDROCHLORIDE 0.4 MILLIGRAM(S): 0.4 CAPSULE ORAL at 21:33

## 2018-04-03 RX ADMIN — Medication 1 TABLET(S): at 09:23

## 2018-04-03 RX ADMIN — FLUCONAZOLE 100 MILLIGRAM(S): 150 TABLET ORAL at 13:20

## 2018-04-03 RX ADMIN — AMLODIPINE BESYLATE 5 MILLIGRAM(S): 2.5 TABLET ORAL at 05:03

## 2018-04-03 RX ADMIN — LAMOTRIGINE 200 MILLIGRAM(S): 25 TABLET, ORALLY DISINTEGRATING ORAL at 17:56

## 2018-04-03 RX ADMIN — PANTOPRAZOLE SODIUM 40 MILLIGRAM(S): 20 TABLET, DELAYED RELEASE ORAL at 05:03

## 2018-04-03 RX ADMIN — Medication 100 MILLIGRAM(S): at 05:03

## 2018-04-03 RX ADMIN — ENOXAPARIN SODIUM 40 MILLIGRAM(S): 100 INJECTION SUBCUTANEOUS at 13:19

## 2018-04-03 RX ADMIN — Medication 10 MILLIGRAM(S): at 09:24

## 2018-04-03 RX ADMIN — LAMOTRIGINE 50 MILLIGRAM(S): 25 TABLET, ORALLY DISINTEGRATING ORAL at 05:03

## 2018-04-03 RX ADMIN — POLYETHYLENE GLYCOL 3350 17 GRAM(S): 17 POWDER, FOR SOLUTION ORAL at 13:20

## 2018-04-03 RX ADMIN — Medication 10 MILLIGRAM(S): at 13:19

## 2018-04-03 RX ADMIN — LEVETIRACETAM 750 MILLIGRAM(S): 250 TABLET, FILM COATED ORAL at 17:55

## 2018-04-03 RX ADMIN — LAMOTRIGINE 50 MILLIGRAM(S): 25 TABLET, ORALLY DISINTEGRATING ORAL at 17:55

## 2018-04-03 NOTE — PROGRESS NOTE ADULT - SUBJECTIVE AND OBJECTIVE BOX
Patient is a 58y old  Male who presents with a chief complaint of Abdominal pain (27 Mar 2018 06:30)      SUBJECTIVE / OVERNIGHT EVENTS: Pt no more N/V, reported improving oral intake as per brother and aide. Afebrile.     MEDICATIONS  (STANDING):  amLODIPine   Tablet 5 milliGRAM(s) Oral daily  docusate sodium 100 milliGRAM(s) Oral two times a day  enoxaparin Injectable 40 milliGRAM(s) SubCutaneous every 24 hours  fluconAZOLE   Tablet 100 milliGRAM(s) Oral daily  lactobacillus acidophilus 1 Tablet(s) Oral two times a day with meals  lamoTRIgine 200 milliGRAM(s) Oral every 12 hours  lamoTRIgine 50 milliGRAM(s) Oral every 12 hours  levETIRAcetam 750 milliGRAM(s) Oral two times a day  metoclopramide Injectable 10 milliGRAM(s) IV Push <User Schedule>  pantoprazole    Tablet 40 milliGRAM(s) Oral before breakfast  polyethylene glycol 3350 17 Gram(s) Oral daily  tamsulosin 0.4 milliGRAM(s) Oral at bedtime  valproic  acid Syrup 1000 milliGRAM(s) Oral at bedtime  valproic  acid Syrup 500 milliGRAM(s) Oral daily    MEDICATIONS  (PRN):  ondansetron Injectable 4 milliGRAM(s) IV Push every 8 hours PRN Nausea and/or Vomiting      Vital Signs Last 24 Hrs  T(C): 36.3 (03 Apr 2018 05:02), Max: 36.7 (02 Apr 2018 21:25)  T(F): 97.4 (03 Apr 2018 05:02), Max: 98 (02 Apr 2018 21:25)  HR: 76 (03 Apr 2018 05:02) (76 - 77)  BP: 137/71 (03 Apr 2018 05:02) (137/71 - 154/99)  BP(mean): --  RR: 17 (03 Apr 2018 05:02) (17 - 17)  SpO2: 99% (03 Apr 2018 05:02) (99% - 99%)  CAPILLARY BLOOD GLUCOSE        I&O's Summary    02 Apr 2018 07:01  -  03 Apr 2018 07:00  --------------------------------------------------------  IN: 625 mL / OUT: 1000 mL / NET: -375 mL        PHYSICAL EXAM:  GENERAL: NAD,   HEAD:  Atraumatic, Normocephalic  EYES: EOMI, PERRLA, conjunctiva and sclera clear  NECK: Supple, No JVD  CHEST/LUNG: Clear to auscultation bilaterally; No wheeze  HEART: Regular rate and rhythm; No murmurs, rubs, or gallops  ABDOMEN: Soft, Nontender, Nondistended; Bowel sounds present  EXTREMITIES:  2+ Peripheral Pulses, No clubbing, cyanosis, or edema  PSYCH: calm  NEUROLOGY: non-focal  SKIN: No rashes or lesions    LABS:    04-03    128<L>  |  90<L>  |  13  ----------------------------<  92  4.1   |  29  |  0.77    Ca    9.2      03 Apr 2018 06:00  Phos  3.1     04-03  Mg     1.8     04-03                    RADIOLOGY & ADDITIONAL TESTS:    Imaging Personally Reviewed:    < from: Upper Endoscopy (04.02.18 @ 14:13) >  Findings:       Patchy candidiasis was found in the middle third of the esophagus and in        the lower third of the esophagus. Brushings were taken for cytology.        Biopsies were taken with a cold forceps for histology from the mid and        distal esophagus.       Esophagogastric landmarks were identified: the Z-line was found at 40        cm, the gastroesophageal junction was found at 40 cm and the site of        hiatal narrowing was found at 40 cm from the incisors.       Mild erythema wasfound in the gastric antrum. Biopsies were taken with        a cold forceps for histology.       The cardia and gastric fundus were normal on retroflexion.       The examined duodenum was normal.                             Impression:          - Monilial esophagitis. Biopsied.                       - Esophagogastric landmarks identified.                       - Mild antral erythema with normal appearing gastric                        mucosa. Peristalsis not observed. Biopsied.                       - Normal examined duodenum.  Recommendation:      - [facility].                       - Protonix 40mg po daily                       - Give Fluconazole 200mg po once today, then 100mg po                        daily x 3 weeks.                       - Vomiting may be due to medication, electrolyte                        disturbance.             < end of copied text >      Consultant(s) Notes Reviewed:      Care Discussed with Consultants/Other Providers:

## 2018-04-03 NOTE — PROGRESS NOTE ADULT - PROBLEM SELECTOR PLAN 5
hypovolemic, improved with IVF, no SIADH as per nephrology, possible 2nd to poor oral intake, N/V, dehydration..  - discussed with nephrology, stared ensure, encourage oral intake, no need for salt tablet.

## 2018-04-04 VITALS
TEMPERATURE: 98 F | SYSTOLIC BLOOD PRESSURE: 112 MMHG | RESPIRATION RATE: 18 BRPM | DIASTOLIC BLOOD PRESSURE: 66 MMHG | HEART RATE: 92 BPM | OXYGEN SATURATION: 100 %

## 2018-04-04 PROCEDURE — 99239 HOSP IP/OBS DSCHRG MGMT >30: CPT

## 2018-04-04 RX ORDER — VALPROIC ACID (AS SODIUM SALT) 250 MG/5ML
20 SOLUTION, ORAL ORAL
Qty: 600 | Refills: 0 | OUTPATIENT
Start: 2018-04-04 | End: 2018-05-03

## 2018-04-04 RX ORDER — TAMSULOSIN HYDROCHLORIDE 0.4 MG/1
1 CAPSULE ORAL
Qty: 30 | Refills: 0 | OUTPATIENT
Start: 2018-04-04 | End: 2018-05-03

## 2018-04-04 RX ORDER — LAMOTRIGINE 25 MG/1
1 TABLET, ORALLY DISINTEGRATING ORAL
Qty: 60 | Refills: 0 | OUTPATIENT
Start: 2018-04-04 | End: 2018-05-03

## 2018-04-04 RX ORDER — VALPROIC ACID (AS SODIUM SALT) 250 MG/5ML
20 SOLUTION, ORAL ORAL
Qty: 0 | Refills: 0 | COMMUNITY
Start: 2018-04-04

## 2018-04-04 RX ORDER — AMLODIPINE BESYLATE 2.5 MG/1
1 TABLET ORAL
Qty: 0 | Refills: 0 | COMMUNITY
Start: 2018-04-04

## 2018-04-04 RX ORDER — AMLODIPINE BESYLATE 2.5 MG/1
1 TABLET ORAL
Qty: 30 | Refills: 0 | OUTPATIENT
Start: 2018-04-04 | End: 2018-05-03

## 2018-04-04 RX ORDER — FLUCONAZOLE 150 MG/1
1 TABLET ORAL
Qty: 0 | Refills: 0 | COMMUNITY
Start: 2018-04-04

## 2018-04-04 RX ORDER — LAMOTRIGINE 25 MG/1
2 TABLET, ORALLY DISINTEGRATING ORAL
Qty: 120 | Refills: 0 | OUTPATIENT
Start: 2018-04-04 | End: 2018-05-03

## 2018-04-04 RX ORDER — VALPROIC ACID (AS SODIUM SALT) 250 MG/5ML
10 SOLUTION, ORAL ORAL
Qty: 300 | Refills: 0 | OUTPATIENT
Start: 2018-04-04 | End: 2018-05-03

## 2018-04-04 RX ORDER — PANTOPRAZOLE SODIUM 20 MG/1
1 TABLET, DELAYED RELEASE ORAL
Qty: 0 | Refills: 0 | COMMUNITY
Start: 2018-04-04

## 2018-04-04 RX ORDER — LEVETIRACETAM 250 MG/1
1 TABLET, FILM COATED ORAL
Qty: 0 | Refills: 0 | COMMUNITY

## 2018-04-04 RX ORDER — LACOSAMIDE 50 MG/1
1 TABLET ORAL
Qty: 0 | Refills: 0 | COMMUNITY

## 2018-04-04 RX ORDER — TAMSULOSIN HYDROCHLORIDE 0.4 MG/1
1 CAPSULE ORAL
Qty: 0 | Refills: 0 | COMMUNITY
Start: 2018-04-04

## 2018-04-04 RX ORDER — VALPROIC ACID (AS SODIUM SALT) 250 MG/5ML
10 SOLUTION, ORAL ORAL
Qty: 0 | Refills: 0 | COMMUNITY
Start: 2018-04-04

## 2018-04-04 RX ORDER — LEVETIRACETAM 250 MG/1
1 TABLET, FILM COATED ORAL
Qty: 60 | Refills: 0 | OUTPATIENT
Start: 2018-04-04 | End: 2018-05-03

## 2018-04-04 RX ORDER — PANTOPRAZOLE SODIUM 20 MG/1
1 TABLET, DELAYED RELEASE ORAL
Qty: 21 | Refills: 0 | OUTPATIENT
Start: 2018-04-04 | End: 2018-04-24

## 2018-04-04 RX ORDER — FLUCONAZOLE 150 MG/1
1 TABLET ORAL
Qty: 19 | Refills: 0 | OUTPATIENT
Start: 2018-04-04 | End: 2018-04-22

## 2018-04-04 RX ADMIN — Medication 500 MILLIGRAM(S): at 13:35

## 2018-04-04 RX ADMIN — FLUCONAZOLE 100 MILLIGRAM(S): 150 TABLET ORAL at 13:36

## 2018-04-04 RX ADMIN — AMLODIPINE BESYLATE 5 MILLIGRAM(S): 2.5 TABLET ORAL at 05:39

## 2018-04-04 RX ADMIN — LAMOTRIGINE 50 MILLIGRAM(S): 25 TABLET, ORALLY DISINTEGRATING ORAL at 05:39

## 2018-04-04 RX ADMIN — Medication 1 TABLET(S): at 09:33

## 2018-04-04 RX ADMIN — LAMOTRIGINE 50 MILLIGRAM(S): 25 TABLET, ORALLY DISINTEGRATING ORAL at 17:26

## 2018-04-04 RX ADMIN — ENOXAPARIN SODIUM 40 MILLIGRAM(S): 100 INJECTION SUBCUTANEOUS at 13:35

## 2018-04-04 RX ADMIN — LAMOTRIGINE 200 MILLIGRAM(S): 25 TABLET, ORALLY DISINTEGRATING ORAL at 05:39

## 2018-04-04 RX ADMIN — Medication 100 MILLIGRAM(S): at 17:26

## 2018-04-04 RX ADMIN — Medication 10 MILLIGRAM(S): at 13:33

## 2018-04-04 RX ADMIN — LAMOTRIGINE 200 MILLIGRAM(S): 25 TABLET, ORALLY DISINTEGRATING ORAL at 17:27

## 2018-04-04 RX ADMIN — LEVETIRACETAM 750 MILLIGRAM(S): 250 TABLET, FILM COATED ORAL at 17:26

## 2018-04-04 RX ADMIN — Medication 100 MILLIGRAM(S): at 05:39

## 2018-04-04 RX ADMIN — PANTOPRAZOLE SODIUM 40 MILLIGRAM(S): 20 TABLET, DELAYED RELEASE ORAL at 05:39

## 2018-04-04 RX ADMIN — Medication 1 TABLET(S): at 17:26

## 2018-04-04 RX ADMIN — Medication 10 MILLIGRAM(S): at 09:33

## 2018-04-04 RX ADMIN — LEVETIRACETAM 750 MILLIGRAM(S): 250 TABLET, FILM COATED ORAL at 05:38

## 2018-04-04 NOTE — SWALLOW BEDSIDE ASSESSMENT ADULT - ASR SWALLOW REFERRAL
Registered Dietitian
Recommend registered dietician consult to ensure patient is able to maintain their caloric/hydration/nutritional needs via oral diet recommendations./Registered Dietitian

## 2018-04-04 NOTE — PROGRESS NOTE ADULT - PROBLEM SELECTOR PROBLEM 2
Acute cystitis without hematuria
Candidiasis of esophagus
Urinary retention with incomplete bladder emptying
Acute cystitis without hematuria
Candidiasis of esophagus

## 2018-04-04 NOTE — SWALLOW BEDSIDE ASSESSMENT ADULT - SWALLOW EVAL: RECOMMENDED DIET
Initiate regular consistency and thin liquids.
1.) initiate puree and thin liquids.  2.) may use straw

## 2018-04-04 NOTE — PROGRESS NOTE ADULT - PROBLEM SELECTOR PROBLEM 4
Hyponatremia
Seizure disorder
Seizure disorder
Urinary retention with incomplete bladder emptying
Seizure disorder
Hyponatremia
Urinary retention with incomplete bladder emptying

## 2018-04-04 NOTE — PROGRESS NOTE ADULT - PROBLEM SELECTOR PROBLEM 5
Hyponatremia
Seizure disorder
Complete heart block
Complete heart block
Hyponatremia
Seizure disorder
Complete heart block
Seizure disorder

## 2018-04-04 NOTE — SWALLOW BEDSIDE ASSESSMENT ADULT - SLP PERTINENT HISTORY OF CURRENT PROBLEM
58 M PMH cerebral palsy, seizure disorder presents with reported 1 day c/o abdominal pain. Afebrile, normotensive. On exam, he is alert, oriented; abdomen is soft and non-distended; oral mucosa is moist. On labs, patient has a leukocytosis, sodium 125, UA with LE and >50 WBCs. CT a/p demonstrates mild bilateral hydronephrosis and a distended bladder.
58 M PMH cerebral palsy, seizure disorder presents with reported 1 day c/o abdominal pain. Afebrile, normotensive. On exam, he is alert, oriented; abdomen is soft and non-distended; oral mucosa is moist. On labs, patient has a leukocytosis, sodium 125, UA with LE and >50 WBCs. CT a/p demonstrates mild bilateral hydronephrosis and a distended bladder.

## 2018-04-04 NOTE — PROGRESS NOTE ADULT - PROBLEM SELECTOR PROBLEM 6
Prophylactic measure
Complete heart block
Seizure disorder
Complete heart block
Prophylactic measure
Prophylactic measure
Seizure disorder

## 2018-04-04 NOTE — PROGRESS NOTE ADULT - PROBLEM SELECTOR PROBLEM 1
Nausea & vomiting
Acute cystitis without hematuria
Acute cystitis without hematuria
Esophagitis determined by endoscopy
Nausea & vomiting
Acute cystitis without hematuria
Nausea & vomiting
Esophagitis determined by endoscopy

## 2018-04-04 NOTE — SWALLOW BEDSIDE ASSESSMENT ADULT - SPECIFY REASON(S)
to assess the swallow mechanism; r/o dysphagia.
to re-assess the swallow mechanism; r/o dysphagia now that nausea has improved

## 2018-04-04 NOTE — DIETITIAN INITIAL EVALUATION ADULT. - NS AS NUTRI INTERV MEALS SNACK
Other (specify)/1. Encourage & assist Pt with meals; Monitor PO diet tolerance;                   2. Monitor labs, weights, hydration status;

## 2018-04-04 NOTE — SWALLOW BEDSIDE ASSESSMENT ADULT - NS ASR SWALLOW FINDINGS DISCUS
brother at bedside, aide from [facility] home, spoke with NP about diet recommendations/Physician/Nursing/Patient/Family
Physician/Nursing/Patient/Family/brother at bedside, aide from [facility] home

## 2018-04-04 NOTE — SWALLOW BEDSIDE ASSESSMENT ADULT - ASR SWALLOW ASPIRATION MONITOR
throat clearing/upper respiratory infection/gurgly voice/pneumonia/change of breathing pattern/cough/fever
gurgly voice/pneumonia/upper respiratory infection/change of breathing pattern/cough/fever/throat clearing

## 2018-04-04 NOTE — PROGRESS NOTE ADULT - PROVIDER SPECIALTY LIST ADULT
Gastroenterology
Gastroenterology
Hospitalist
Nephrology
Nephrology
Hospitalist
Hospitalist

## 2018-04-04 NOTE — DIETITIAN INITIAL EVALUATION ADULT. - DIET TYPE
supplement (specify)/Ensure Enlive 8oz. 2x daily (will provide additional ~700 Kcal, ~40 gm Protein);/dysphagia 3, soft, thin liquids

## 2018-04-04 NOTE — PROGRESS NOTE ADULT - PROBLEM SELECTOR PROBLEM 7
Complete heart block
Prophylactic measure
Complete heart block
Prophylactic measure

## 2018-04-04 NOTE — SWALLOW BEDSIDE ASSESSMENT ADULT - COMMENTS
Patient's brother at bedside and served as reliable informant for patient's swallowing history.  As per brother, patient is tolerating puree and thin liquids without gagging/coughing.
Patient's brother at bedside and served as reliable informant for patient's swallowing history.  As per brother, patient is tolerating clear liquids and purees however when provided with soft solids and solids patient noted with mastication and gagging which results in vomiting.  Patient's brother just witnessed this even during lunch.

## 2018-04-04 NOTE — SWALLOW BEDSIDE ASSESSMENT ADULT - SWALLOW EVAL: DIAGNOSIS
1.) Patient presents with adequate oral stage skills for puree, soft solids, solids and thin liquids marked by adequate bolus acceptance, formation, transfer and clearance.  2.) Pharyngeal stage of swallow for puree, soft solids, solids, and thin liquids marked by mildly delayed swallow trigger, adequate hyolaryngeal elevation and no overt s/s of penetration/aspiration.  3.) Consistencies higher than puree (mechanical soft, soft solids and solids were refused by patient)
1.) Patient presents with adequate oral stage skills for puree and thin liquids marked by adequate bolus acceptance, formation, transfer and clearance.  2.) Pharyngeal stage of swallow for puree and thin liquids marked by mildly delayed swallow trigger, adequate hyolaryngeal elevation and no overt s/s of penetration/aspiration.  3.) Consistencies higher than puree (mechanical soft, soft solids and solids were refused by patient)

## 2018-04-04 NOTE — PROGRESS NOTE ADULT - ATTENDING COMMENTS
D/c with brother, aide, and attempted to call group home to discuss the case several times, LM awaiting for calling back. D/c with brother, aide, updated , D/c planning today, follow up with PCP out patient. Discharge time 40 min.

## 2018-04-04 NOTE — DIETITIAN INITIAL EVALUATION ADULT. - OTHER INFO
Pt seen for Length of stay. Pt 57 yo male with history of cerebral palsy, seizure disorder; Pt with admit diagnosis: Sepsis. Pt appears alert @ time of visit. Pt's brother @ bed side answered questions during interview. Per brother Pt usually with good appetite. Pt usually eats Regular food @ home reported. Of note Pt passed Swallow Bedside Assessment Adult, SLP rec: Initiate Regular consistency and Thin Liquids (4/4). No report of nausea, vomiting or diarrhea @ this time. Per brother Pt's UBW: ~157#; no weight loss or weight changes (PTA). Spoke to ADS (NP). RDN remains available, Pt's brother made aware.

## 2018-04-04 NOTE — SWALLOW BEDSIDE ASSESSMENT ADULT - ADDITIONAL RECOMMENDATIONS
1. As per brother's request, patient to be placed on soft solids however is able to tolerate regular consistency.
1. Initiate oral diet of puree and thin liquids as cleared by GI team  2.  This department to continue to follow for speech and swallowing therapy as schedule permits.   3. Patient would benefit from additional speech and swallowing therapy upon discharge.  Patient can call this department at [phone] for outpatient services.

## 2018-04-04 NOTE — PROGRESS NOTE ADULT - SUBJECTIVE AND OBJECTIVE BOX
Patient is a 58y old  Male who presents with a chief complaint of Abdominal pain (27 Mar 2018 06:30)      SUBJECTIVE / OVERNIGHT EVENTS: passed S &S, Pt wants to go back to group home. Brother reported OK eating, Pt takes ensure.     MEDICATIONS  (STANDING):  amLODIPine   Tablet 5 milliGRAM(s) Oral daily  docusate sodium 100 milliGRAM(s) Oral two times a day  enoxaparin Injectable 40 milliGRAM(s) SubCutaneous every 24 hours  fluconAZOLE   Tablet 100 milliGRAM(s) Oral daily  lactobacillus acidophilus 1 Tablet(s) Oral two times a day with meals  lamoTRIgine 200 milliGRAM(s) Oral every 12 hours  lamoTRIgine 50 milliGRAM(s) Oral every 12 hours  levETIRAcetam 750 milliGRAM(s) Oral two times a day  metoclopramide Injectable 10 milliGRAM(s) IV Push <User Schedule>  pantoprazole    Tablet 40 milliGRAM(s) Oral before breakfast  polyethylene glycol 3350 17 Gram(s) Oral daily  tamsulosin 0.4 milliGRAM(s) Oral at bedtime  valproic  acid Syrup 1000 milliGRAM(s) Oral at bedtime  valproic  acid Syrup 500 milliGRAM(s) Oral daily    MEDICATIONS  (PRN):  ondansetron Injectable 4 milliGRAM(s) IV Push every 8 hours PRN Nausea and/or Vomiting      Vital Signs Last 24 Hrs  T(C): 36.4 (04 Apr 2018 12:57), Max: 36.6 (03 Apr 2018 20:51)  T(F): 97.6 (04 Apr 2018 12:57), Max: 97.9 (03 Apr 2018 20:51)  HR: 93 (04 Apr 2018 12:57) (77 - 93)  BP: 91/56 (04 Apr 2018 12:57) (91/56 - 153/89)  BP(mean): --  RR: 18 (04 Apr 2018 12:57) (18 - 19)  SpO2: 99% (04 Apr 2018 12:57) (99% - 100%)  CAPILLARY BLOOD GLUCOSE        I&O's Summary    03 Apr 2018 07:01  -  04 Apr 2018 07:00  --------------------------------------------------------  IN: 1415 mL / OUT: 1000 mL / NET: 415 mL        PHYSICAL EXAM:  GENERAL: NAD,   HEAD:  Atraumatic, Normocephalic  EYES: EOMI, PERRLA, conjunctiva and sclera clear  NECK: Supple, No JVD  CHEST/LUNG: Clear to auscultation bilaterally; No wheeze  HEART: Regular rate and rhythm; No murmurs, rubs, or gallops  ABDOMEN: Soft, Nontender, Nondistended; Bowel sounds present  EXTREMITIES:  2+ Peripheral Pulses, No clubbing, cyanosis, or edema  PSYCH: calm  NEUROLOGY: non-focal  SKIN: No rashes or lesions    LABS:    04-03    128<L>  |  90<L>  |  13  ----------------------------<  92  4.1   |  29  |  0.77    Ca    9.2      03 Apr 2018 06:00  Phos  3.1     04-03  Mg     1.8     04-03                    RADIOLOGY & ADDITIONAL TESTS:    Imaging Personally Reviewed:    Consultant(s) Notes Reviewed:      Care Discussed with Consultants/Other Providers:

## 2018-04-04 NOTE — PROGRESS NOTE ADULT - PROBLEM SELECTOR PROBLEM 3
Urinary retention with incomplete bladder emptying
Acute cystitis without hematuria
Hyponatremia
Hyponatremia
Urinary retention with incomplete bladder emptying
Hyponatremia
Urinary retention with incomplete bladder emptying
Acute cystitis without hematuria

## 2018-04-04 NOTE — SWALLOW BEDSIDE ASSESSMENT ADULT - SWALLOW EVAL: RECOMMENDED FEEDING/EATING TECHNIQUES
position upright (90 degrees)/may use straw/small sips/bites
position upright (90 degrees)/small sips/bites/may use straw/alternate food with liquid

## 2018-04-04 NOTE — PROGRESS NOTE ADULT - PROBLEM SELECTOR PLAN 7
History of complete heart block. PPM placed 2/2018.  out-pt PPM eval
Lovenox
History of complete heart block. PPM placed 2/2018.  out-pt PPM eval
Improve score of 1; will initiate enoxaparin for DVT ppx given reduced mobility
Improve score of 1; will initiate enoxaparin for DVT ppx given reduced mobility  given no plans for EGD, DC planning after completion of abx today if no objection by renal as NA is improving   dw SW and CM, Time spent on DC planning 40 min
Improve score of 1; will initiate enoxaparin for DVT ppx given reduced mobility  given no plans for EGD, DC planning after meeting with group home admin ( per their protocol ). time spent on Dc planning 40 min
Lovenox

## 2018-04-04 NOTE — PROGRESS NOTE ADULT - PROBLEM SELECTOR PLAN 5
hypovolemic, improved with IVF, no SIADH as per nephrology, possible 2nd to poor oral intake, N/V, dehydration..  - discussed with nephrology, continue ensure, encourage oral intake, no need for salt tablet. Na levels fluctuates, can follow up with PCP out patient in 1 week.

## 2018-04-19 ENCOUNTER — APPOINTMENT (OUTPATIENT)
Dept: ELECTROPHYSIOLOGY | Facility: CLINIC | Age: 58
End: 2018-04-19

## 2018-07-27 ENCOUNTER — APPOINTMENT (OUTPATIENT)
Dept: OTOLARYNGOLOGY | Facility: CLINIC | Age: 58
End: 2018-07-27

## 2018-12-03 NOTE — PROGRESS NOTE ADULT - PROBLEM/PLAN-1
Telephone Encounter by Yoselin Ngo at 09/06/18 11:15 AM     Author:  Yoselin Ngo Service:  (none) Author Type:  Patient      Filed:  09/06/18 11:15 AM Encounter Date:  9/6/2018 Status:  Signed     :  Yoselin Ngo (Patient )            Appointment booked[CA1.1M]      Revision History        User Key Date/Time User Provider Type Action    > CA1.1 09/06/18 11:15 AM Yoselin Ngo Patient  Sign    M - Manual            
DISPLAY PLAN FREE TEXT

## 2019-08-15 NOTE — DISCHARGE NOTE ADULT - SMOKING EVEN A SINGLE PUFF INCREASES THE LIKELIHOOD OF A FULL RELAPSE, WITHDRAWAL SYMPTOMS PEAK WITHIN 1-2 WEEKS, BUT CAN PERSIST FOR MONTHS
History of perennial nasal and ocular symptoms that get worse in the spring and fall.  Atrovent has been helpful.  Flonase has been helpful.  Negative allergy testing.  Increased symptoms around cats.  Zyrtec is helpful.  Singulair is helpful.  Currently with postnasal drainage that is picked up over the last few weeks.  Additionally having rhinorrhea.    Either vasomotor rhinitis versus local allergic rhinitis. Follows with ENT.       Allergy skin testing was negative.      - Stop Flonase 2 sprays per nostril daily.  - Dymista 1 spray/nostril twice daily.  - Ipratropium 2 sprays/nostril two times daily as needed.   - Zyrtec as needed.   - Singulair 10mg by mouth daily at night.    Statement Selected

## 2019-10-26 NOTE — PROGRESS NOTE ADULT - PROBLEM SELECTOR PLAN 6
History of complete heart block. PPM placed 2/2018.  out-pt PPM eval no vomiting/no loss of consciousness/no nausea/no dizziness, no headache, no SI/HI

## 2019-12-10 ENCOUNTER — APPOINTMENT (OUTPATIENT)
Dept: ELECTROPHYSIOLOGY | Facility: CLINIC | Age: 59
End: 2019-12-10
Payer: MEDICARE

## 2019-12-10 PROCEDURE — 93280 PM DEVICE PROGR EVAL DUAL: CPT

## 2019-12-11 RX ORDER — LAMOTRIGINE 150 MG/1
150 TABLET ORAL TWICE DAILY
Refills: 0 | Status: ACTIVE | COMMUNITY
Start: 2017-07-13

## 2019-12-11 RX ORDER — LEVETIRACETAM 100 MG/ML
SOLUTION ORAL
Refills: 0 | Status: ACTIVE | COMMUNITY

## 2019-12-11 RX ORDER — HYDRALAZINE HYDROCHLORIDE 50 MG/1
50 TABLET ORAL TWICE DAILY
Refills: 0 | Status: ACTIVE | COMMUNITY

## 2019-12-11 RX ORDER — FINASTERIDE 5 MG/1
5 TABLET, FILM COATED ORAL
Refills: 0 | Status: ACTIVE | COMMUNITY

## 2019-12-11 RX ORDER — DOCUSATE SODIUM 100 MG/1
100 CAPSULE, LIQUID FILLED ORAL TWICE DAILY
Refills: 0 | Status: DISCONTINUED | COMMUNITY
End: 2019-12-11

## 2019-12-11 RX ORDER — METRONIDAZOLE 7.5 MG/G
0.75 GEL TOPICAL
Qty: 45 | Refills: 0 | Status: DISCONTINUED | COMMUNITY
Start: 2017-07-19 | End: 2019-12-11

## 2019-12-11 RX ORDER — FLUTICASONE PROPIONATE AND SALMETEROL 50; 250 UG/1; UG/1
250-50 POWDER RESPIRATORY (INHALATION)
Refills: 0 | Status: ACTIVE | COMMUNITY

## 2019-12-11 RX ORDER — SENNOSIDES 8.6 MG TABLETS 8.6 MG/1
8.6 TABLET ORAL
Refills: 0 | Status: ACTIVE | COMMUNITY

## 2019-12-11 RX ORDER — VALPROIC ACID 500 MG/10ML
250 SOLUTION ORAL
Refills: 0 | Status: ACTIVE | COMMUNITY

## 2019-12-11 RX ORDER — AMLODIPINE BESYLATE 5 MG/1
5 TABLET ORAL
Refills: 0 | Status: ACTIVE | COMMUNITY

## 2019-12-11 RX ORDER — TAMSULOSIN HYDROCHLORIDE 0.4 MG/1
0.4 CAPSULE ORAL
Refills: 0 | Status: ACTIVE | COMMUNITY

## 2019-12-11 RX ORDER — ATENOLOL 25 MG/1
25 TABLET ORAL
Refills: 0 | Status: DISCONTINUED | COMMUNITY
End: 2019-12-11

## 2020-02-27 NOTE — PROGRESS NOTE ADULT - PROBLEM SELECTOR PLAN 5
no further staring spells, no evidence of breakthrough sz. cw current AED, switched to PO regimen Area H Indication Text: Tumors in this location are included in Area H (eyelids, eyebrows, nose, lips, chin, ear, pre-auricular, post-auricular, temple, genitalia, hands, feet, ankles and areola).  Tissue conservation is critical in these anatomic locations.

## 2020-03-10 ENCOUNTER — APPOINTMENT (OUTPATIENT)
Dept: ELECTROPHYSIOLOGY | Facility: CLINIC | Age: 60
End: 2020-03-10
Payer: MEDICARE

## 2020-03-10 PROCEDURE — 93294 REM INTERROG EVL PM/LDLS PM: CPT

## 2020-03-10 PROCEDURE — 93296 REM INTERROG EVL PM/IDS: CPT

## 2020-04-01 NOTE — ED PROVIDER NOTE - NEUROLOGICAL LEVEL OF CONSCIOUSNESS
Pt does not use [employer] tj. Scheduled for telephone visit 4/2   follows commands/responds to questions

## 2020-06-30 ENCOUNTER — APPOINTMENT (OUTPATIENT)
Dept: ELECTROPHYSIOLOGY | Facility: CLINIC | Age: 60
End: 2020-06-30
Payer: MEDICARE

## 2020-06-30 PROCEDURE — 93296 REM INTERROG EVL PM/IDS: CPT

## 2020-06-30 PROCEDURE — 93294 REM INTERROG EVL PM/LDLS PM: CPT

## 2020-09-01 ENCOUNTER — APPOINTMENT (OUTPATIENT)
Dept: ELECTROPHYSIOLOGY | Facility: CLINIC | Age: 60
End: 2020-09-01
Payer: MEDICARE

## 2020-09-01 VITALS — RESPIRATION RATE: 14 BRPM | SYSTOLIC BLOOD PRESSURE: 128 MMHG | HEART RATE: 85 BPM | DIASTOLIC BLOOD PRESSURE: 72 MMHG

## 2020-09-01 DIAGNOSIS — I44.2 ATRIOVENTRICULAR BLOCK, COMPLETE: ICD-10-CM

## 2020-09-01 PROCEDURE — 93280 PM DEVICE PROGR EVAL DUAL: CPT

## 2020-09-01 RX ORDER — B-COMPLEX WITH VITAMIN C
1250 (500 CA) TABLET ORAL
Refills: 0 | Status: DISCONTINUED | COMMUNITY
End: 2020-09-01

## 2020-12-01 ENCOUNTER — APPOINTMENT (OUTPATIENT)
Dept: ELECTROPHYSIOLOGY | Facility: CLINIC | Age: 60
End: 2020-12-01

## 2021-03-04 ENCOUNTER — APPOINTMENT (OUTPATIENT)
Dept: ELECTROPHYSIOLOGY | Facility: CLINIC | Age: 61
End: 2021-03-04

## 2021-06-11 NOTE — PROGRESS NOTE ADULT - PROBLEM SELECTOR PLAN 3
Telemetry Bed?: No   Admitting Physician: JEFFREY PLUMMER [[id number]]   Comments: do not suspect covid   Is this a telephone or verbal order?: This is a telephone order from the admitting physician   due to enlarged prostate, presumed BPH, now on Flomax, passed TOV, repeat sono with resolved hydro. out-pt  follow up

## 2021-10-25 NOTE — ED ADULT TRIAGE NOTE - ESI TRIAGE ACUITY LEVEL, MLM
Problem List Items Addressed This Visit        Endocrine and Metabolic    Vitamin D deficiency    Relevant Orders    VITAMIN D -25 HYDROXY (Completed)       Gastrointestinal and Abdominal    Screen for colon cancer       Genitourinary and Reproductive    Visit for screening mammogram    Relevant Orders    MAMMO SCREENING BILATERAL W RAE       Health Encounters    Annual physical exam - Primary     10/25/2021  ======================  Colonoscopy--pt declines--check with insurance company about if they cover the cologuard screen for colon cancer     Mammogram--2/2018--should get the mammogram     Pap smear--5/2019--Dr Rodriguez --pap was fine --bcp  Stopped in 5/2019 ---LMP was 12/2020 and no hot flashes -will make an appointment to see Dr Rodriguez    Bone density--not needed yet --get about a year after menopause --2022           Relevant Orders    CBC WITH DIFFERENTIAL (Completed)    COMPREHENSIVE METABOLIC PANEL (Completed)    LIPID PANEL WITHOUT REFLEX (Completed)    THYROID STIMULATING HORMONE REFLEX (Completed)    URINALYSIS WITH MICROSCOPY & CULTURE IF INDICATED (Completed)    VITAMIN D -25 HYDROXY (Completed)    IAN SCREEN WITH ANTIBODY AND IFA REFLEX (Completed)    C REACTIVE PROTEIN (Completed)    RHEUMATOID FACTOR (Completed)    SEDIMENTATION RATE WESTERGREN (Completed)    URIC ACID (Completed)       Mental Health    Anxiety    Relevant Medications    sertraline (ZOLOFT) 50 MG tablet    Other Relevant Orders    THYROID STIMULATING HORMONE REFLEX (Completed)       Musculoskeletal and Injuries    Arthritis of both hands     Check for rheumatoid arthritis and other autoimmune arthritis  -- her mother has a mild case of rheumatoid arthritis          Relevant Orders    IAN SCREEN WITH ANTIBODY AND IFA REFLEX (Completed)    C REACTIVE PROTEIN (Completed)    RHEUMATOID FACTOR (Completed)    SEDIMENTATION RATE WESTERGREN (Completed)    URIC ACID (Completed)    Chronic pain of right knee     Check xray of the right knee          Relevant Orders    XR KNEE MIN 4 VIEWS RIGHT (Completed)    IAN SCREEN WITH ANTIBODY AND IFA REFLEX (Completed)    C REACTIVE PROTEIN (Completed)    RHEUMATOID FACTOR (Completed)    SEDIMENTATION RATE WESTERGREN (Completed)    URIC ACID (Completed)       Skin    Dermatitis    Relevant Medications    desoximetasone (TOPICORT) 0.25 % cream         3

## 2022-12-18 NOTE — PROGRESS NOTE ADULT - PROBLEM SELECTOR PLAN 3
Indication: Syncope.



Multiple contiguous axial images obtained through the head without contrast.



Comparison: None



Age-appropriate global atrophy.  Tiny remote lacunar infarct anterior limb

right internal capsule.  No acute intracranial hemorrhage, abnormal

extra-axial fluid collection, or mass effect.  Fourth ventricle is midline

without hydrocephalus.  Gray-white matter differentiation preserved.  Bony

calvarium intact.  Visualized paranasal sinuses and mastoid air cells are

clear.



Impression: Lacunar infarct right internal capsule.  Remaining CT head without

contrast exam is negative.



Comment: Preliminary interpretation made by [facility].  No critical discrepancy. - Continue using helmet  - Continue with bilateral leg braces while ambulating  - Continue fall precautions

## 2023-01-30 NOTE — ED ADULT NURSE NOTE - AS O2 DELIVERY
Occupational Therapy Daily Treatment Note   Date: 1/25/2023  Name: Cj Kidd  Clinic Number: [id number]  Age: 1 y.o. 0 m.o.    Therapy Diagnosis:   Encounter Diagnosis   Name Primary?    Sensory processing difficulty Yes       Physician: Glo Ybarra MD    Physician Orders: Evaluate and Treat  Medical Diagnosis: Sensory processing difficulty  Evaluation Date: 10/4/2022  Insurance Authorization Period Expiration: 12/31/2023  Plan of Care Certification Period: 10/4/2022 - 04/04/2023    Visit # / Visits authorized: 3 / 20  Time In: 1:45 PM  Time Out: 2:30 PM  Total Billable Time: 45 minutes    Precautions:  Standard  Subjective     Pt / caregiver reports: Mother brought Cj to therapy today and remained in lobby throughout session. Reported Cj did not enjoy going up and down equipment at a birthday party recently. Discussed vestibular processing and body awareness and how that can impact performance in natural environments.  She was compliant with home exercise program given last session.     Response to previous treatment: Tolerated hand held assist to take 2-3 steps    Pain Child unable to rate pain on a numeric scale. No pain behaviors or reports of pain.  Objective     Cj participated in dynamic functional therapeutic activities to improve functional performance for 45 minutes, including:    Sensorimotor Activities  Began session took place in smaller space, responded well to dim lights, soft music, gentle rocking on lap for regulation.  Tolerated sitting on mat without therapist support. Engaged in familiar fine motor task (placing rings on dowel).  Demonstrated preference for rings all being flipped over the same way/ same direction.   Tolerated removing shoes and tactile input from towel to feet. Tolerated deep pressure to bilateral upper extremities and bilateral lower extremities for body awareness and proprioceptive input.    Positioned in supported standing, demonstrated  weightbearing through bilateral lower extremities x4 minutes while engaged with fine motor task at child sized table.  Utilized table as external support to lean.   Transitioned to open gym area to promote tolerance to multi-sensory input in busy environment.  Ascended vertical ladder with maximum support, tolerated being on second story of playhouse and engaging with familiar activity.    Descended slide in therapist lap  Peek a brown with caregiver when transitioning out to lobby with increased affect    Formal Testing:   None on this date.      Completed 10/4  The PDMS 2nd Edition is a standardized test which consists of six subtests that measures interrelated motor abilities that develop early in life for ages 0-72 months. The grasping subtest measures a child's ability to use his/her hands. It begins with the ability to hold an object with one hand and progresses to actions involving the controlled use of the fingers of both hands. The visual-motor integration (VMI) subtest measures a child's ability to use his/her visual perceptual skills to perform complex eye-hand coordination tasks, such as reaching and grasping for an object, building with blocks, and copying designs. Standard scores are measured with a mean of 10 and standard deviation of 3.      Corrected age: 19 Months       Raw Score Standard Score Percentile Age Equivalent Description   Grasping 40 9 37 14 months Average   VMI 59 4 2 12 months Poor      The Sensory Profile 2 provides a standardized tool for evaluating a child's sensory processing patterns in the context of every day life, which provides a unique way to determine how sensory processing may be contributing to or interfering with participation. It is grouped into 3 main areas: 1) Sensory System scores (general, auditory, visual, touch, movement, body position, oral), 2) Behavioral scores (behavioral, conduct, social emotional, attentional), 3) Sensory pattern scores (seeking/seeker,  "avoiding/avoider, sensitivity/sensor, registration/bystander). Scores are interpreted as Much Less Than Others, Less Than Others, Just Like the Majority of Others, More Than Others, or Much More Than Others.        Despite results from parent-reported sensory profile suggesting sensory processing "just like the majority of others" in all areas, Cj demonstrates challenges in areas of vestibular processing, evidenced by delayed ambulation despite adequate strength and coordination to ambulate independently, and preferences for proprioceptive input.     Home Exercises and Education Provided     Education provided:   - Caregiver educated on current performance and POC. Caregiver verbalized understanding.  - Caregiver educated on Winn brushing protocol for tactile sensitivity. Caregiver verbalized understanding.   - Caregiver educated on promoting regulation by preparing Cj for therapy with visual aid. Recorded therapist on mother's phone to show to Cj before coming to therapy.  Caregiver verbalized understanding.   - Discussed ways to promote proprioceptive input and tactile processing with bilateral lower extremities.  Caregiver verbalized understanding.   - Discussed deep squeezes on bilateral lower extremities to promote body awareness.   -Discussed sensory bin play to promote tactile processing. Caregiver verbalized understanding.     Written Home Exercises Provided: Patient instructed to cont prior HEP.  Exercises were reviewed and caregiver was able to demonstrate them prior to the end of the session and displayed good  understanding of the HEP provided.     See EMR under Patient Instructions for exercises provided prior visit.       Assessment     Cj was seen for an occupational therapy follow-up session. Cj with good tolerance to session with min cues for redirection. Demonstrated improved regulation today, supporting ability to engage in familiar and non-familiar tasks and respond " to multi-sensory environment without loss of state. Benefits from proprioceptive and vestibular inputs to support regulation. Cj is progressing well towards her goals and there are no updates to goals at this time. Cj will continue to benefit from skilled outpatient occupational therapy to address the deficits listed in the problem list on initial evaluation to maximize potential level of independence and progress toward age appropriate skills.    Pt prognosis is Good.  Anticipated barriers to occupational therapy:  none at this time.   Pt's spiritual, cultural and educational needs considered and pt agreeable to plan of care and goals.    Goals:  Short term goals:  Goal: Demonstrate improved vestibular processing by tolerating movement in the sagittal plane with minimal external support on 60% of trials.   Date Initiated: 10/4/2022   Duration: 3 months  Status: Initiated  Comments: currently not tolerating with Maximal external support 10/4/2022       Goal: Demonstrate improved self-care skills by doffing socks with moderate assistance on 60% of trials.   Date Initiated: 10/4/2022   Duration: 3 months  Status: Initiated  Comments: Maximal assistance 10/4/2022       Goal: Demonstrate improved visual motor skills by stacking  5 blocks with verbal cues in 75% of opportunities   Date Initiated: 10/4/2022   Duration: 3 months  Status: Initiated  Comments: Unable to stack blocks 10/4/2022          Long term goals:  Goal: Patient/family will verbalize understanding of home exercise program and report ongoing adherence to recommendations.   Date Initiated: 10/4/2022   Duration: Ongoing through discharge   Status: Initiated  Comments: provided vestibular activities for home use 10/4/2022       Goal: Demonstrate improved vestibular processing by ambulating 3 feet without external support on 80% of trials.   Date Initiated: 10/4/2022   Duration: 6 months  Status: Initiated  Comments: currently needing external  support for all ambulation 10/4/2022       Goal: Demonstrate improved self-care skills by doffing socks independently on 80% of trials.   Date Initiated: 10/4/2022   Duration: 6 months  Status: Initiated  Comments: Currently maximal assistance 10/4/2022       Goal: Demonstrate improved visual motor skills by stacking 10 blocks with verbal cues in 75% of opportunities.   Date Initiated: 10/4/2022   Duration: 6 months  Status: Initiated  Comments: unable to stack blocks 10/4/2022         Plan   Certification Period/Plan of care expiration: 10/4/2022 to 04/04/2023.     Outpatient Occupational Therapy 1 times per week for 6 months to include the following interventions: Therapeutic activities, Therapeutic exercise, Patient/caregiver education, Home exercise program, ADL training, and Sensory integration. Therapy will be discontinued when child has met all goals, is not making progress, parent discontinues therapy, and/or for any other applicable reasons    Nina Ahmadi, MOT, OTR/L  1/25/2023                                room air

## 2024-01-15 NOTE — ED PROVIDER NOTE - ATTENDING CONTRIBUTION TO CARE
Progress Note    Patient: Jennifer Blank Date: 1/15/2024   female, 33 year old  Admit Date: 1/12/2024   Attending: Tutu Jaramillo MD      Subjective:  Jennifer Blank is a 33 year old female who is being seen in follow up for Benzodiazepine withdrawal with complication (CMD)     No acute events overnight. Pt feeling better today.     Medications: personally reviewed today in this patient's active orders section of epic  Allergies:   Allergies as of 01/12/2024 - Reviewed 01/08/2024   Allergen Reaction Noted    Nickel RASH 01/28/2016    Tramadol SEIZURES and Other (See Comments) 10/05/2015    Acetaminophen GI UPSET, NAUSEA, Nausea & Vomiting, and VOMITING 09/20/2013    Ibuprofen Nausea & Vomiting and Other (See Comments) 02/12/2016       PHYSICAL EXAM:  Visit Vitals  /73 (BP Location: RUE - Right upper extremity, Patient Position: Semi-Lyons's)   Pulse 96   Temp 99.7 °F (37.6 °C) (Oral)   Resp 18   Ht 5' 6\" (1.676 m)   Wt 64.1 kg (141 lb 5 oz)   LMP 01/28/2023 (Exact Date)   SpO2 100%   BMI 22.81 kg/m²     General: Ill appearing, disheveled  Lungs: Clear to auscultation bilaterally and unlabored breathign  Heart:  Regular rate and rhythm and S1, S2 present  Abdomen: Active bowel sounds, tender to palpation, no rigidity or guarding.  Extremities: cyanosis absent; no obvious lesions or wounds.  Neurologic:  Responds to questions appropriately No focal motor or sensory changes appreciated    Labs:  Recent Labs   Lab 01/15/24  0640 01/14/24  1833 01/14/24  1553 01/14/24  0410 01/13/24  0552 01/12/24  2245   WBC 17.6* 21.5*  --  12.6*   < > 20.5*   RBC 3.50* 4.22  --  4.02   < > 5.04   HGB 10.2* 12.2 11.9* 11.9*   < > 14.4   HCT 30.4* 36.7 34.9* 34.3*   < > 43.3    395  --  409   < > 578*   SEG  --  84  --  75  --  82    < > = values in this interval not displayed.     Recent Labs   Lab 01/15/24  0640 01/14/24  1833 01/14/24  0410   SODIUM 141 138 139   POTASSIUM 3.6 3.4 3.7  3.6   CHLORIDE 110  108 112*   CO2 23 27 23   BUN 5* 6 8   CREATININE 0.59 0.53 0.64   GLUCOSE 103* 118* 101*   CALCIUM 7.7* 8.5 8.4   ALBUMIN 2.5* 2.9* 2.8*   AST 25 24 14   GPT 34 27 17   BILIRUBIN 0.4 0.6 0.5   ALKPT 36* 38* 37*       Assessment & Plan:       Perforated Duodenal ulcer -   On CT abdomen with contrast 1/14  Ordered d/t Diarrhea and abdominal pain  Gen surg consulted  S/p laparoscopic repair 1/15  NPO for 3 days, then contrast study to check for leak  Vancomycin/Zosyn  BID IV PPI      Polysubstance Withdrawal -  Likely benzodiazepine and amphetamine withdrawal on admission  Adderall discontinued  PDMP reviewed, pt receives alprazolam TID prn 90 tabs per month  Diazepam listed as 5mg q6h prn, but only given 30 tabs per 30 days   Received am dose of PTA adderall, will discontinue adderall for now  Started on scheduled diazepam upon admission  Afternoon dose of diazepam held d/t holding parameter of somnolence  Discontinued scheduled diazepam  Continue ativan PRN at home dose  Drug screen only positive for fentanyl on admission  Negative for amphetamine and benzodiazepine, but was positive for these on a drug screen 5 days prior  Repeated Urine drug screen which remains positive for fentanyl   Pt has been in police custody for ~1 week  Skin check repeated, no fentanyl identified      Abdominal Pain  Diarrhea -   Numerous liquid bowel movements since last night  Stools initially dark, but not black  Now appear more green  Rectal tube ordered  C diff negative, GI pathogen panel in process  Hemoccult positive, but stool not grossly bloody  Rechecking H&H  CT abdomen/pelvis with IV and PO contrast ordered  Suspect diarrhea is related to opioid withdrawal, but will rule out intra-abdominal pathology  TID banana flakes ordered  Will continue IVFs at 100 ml/hr as pt with significant GI fluid losses      ADHD  anxiety -   Home adderall restarted on admission, discontinued for now.     Intravascular volume depletion -   I&O  appears net negative  High specific gravity on UA  2L LR bolus ordered, will continue IVFs at 150 ml/hr afterwards for 1 day    DVT Prophylaxis  Current Active Medications for DVT Prophylaxis (From admission, onward)           Stop     heparin (porcine) injection 5,000 Units  5,000 Units,   Subcutaneous,   3 times per day         --                    Central Line- none  Ramsey Catheter- none    Code status: Full Resuscitation    Disposition: tbd, not stable for disharge at this time. Possibly ready for dc in 1-2 days    Tutu Jaramillo MD  Hospitalist  1/15/2024  7:54 AM         57 y/o m from group home presents with cough/nasal congestion/elev temp, found to have elev wbc at urgent care. Per staff, patient has had nasal congestion and dry cough today with decreased appetite, did not want to eat his dinner. They took his temp and found it to be 100.3 so they took him to urgent care who did blood work and found it to be elevated. Patient himself notes he has mild epigastric abdominal pain, unable to qualify further. per staff, his ms is at baseline.  Exam  GEN - NAD; A+O x2 (baseline)  HEAD - NC/AT   EYES- PERRL, EOMI  ENT: Airway patent, dry mm, Oral cavity and pharynx normal. No inflammation, swelling, exudate, or lesions.  NECK: Neck supple, non-tender without lymphadenopathy, no masses.  PULMONARY - CTA b/l, symmetric breath sounds.   CARDIAC -s1s2, RRR, no M,G,R  ABDOMEN - +BS, ND, NT, soft, no guarding, no rebound, no masses   BACK - no CVA tenderness, Normal  spine   EXTREMITIES - FROM, symmetric pulses, capillary refill < 2 seconds, no edema   SKIN - no rash or bruising   NEUROLOGIC - alert, speech clear, no focal deficits  PSYCH -nl mood/affect, cooperative  a/p-58m from a group home presents with cough/congestion, mild upper abd pain with nontender abdomen, elev wbc at urgent care, nontoxic appearing, given patient very poor historian, will check labs, cxr, ua, ct a/p, pain ctrl prn, reass. 59 y/o m from group home presents with cough/nasal congestion/elev temp, found to have elev wbc at urgent care. Per staff, patient has had nasal congestion and dry cough today with decreased appetite, did not want to eat his dinner. They took his temp and found it to be 100.3 so they took him to urgent care who did blood work and found it to be elevated. Patient himself notes he has mild epigastric abdominal pain, unable to qualify further. per staff, his ms is at baseline.  Exam  GEN - NAD; A+O x2 (baseline)  HEAD - NC/AT   EYES- PERRL, EOMI  ENT: Airway patent, dry mm, Oral cavity and pharynx normal. No inflammation, swelling, exudate, or lesions.  NECK: Neck supple, non-tender without lymphadenopathy, no masses.  PULMONARY - CTA b/l, symmetric breath sounds.   CARDIAC -s1s2, tachy RR, no M,G,R  ABDOMEN - +BS, ND, NT, soft, no guarding, no rebound, no masses   BACK - no CVA tenderness, Normal  spine   EXTREMITIES - FROM, symmetric pulses, capillary refill < 2 seconds, no edema   SKIN - no rash or bruising   NEUROLOGIC - alert, speech clear, no focal deficits  PSYCH -nl mood/affect, cooperative  a/p-58m from a group home presents with cough/congestion, mild upper abd pain with nontender abdomen, elev wbc at urgent care, nontoxic appearing, given patient very poor historian, will check labs, cxr, ua, ct a/p, pain ctrl prn, reass.

## 2024-01-23 NOTE — DISCHARGE NOTE ADULT - NSTOBACCOREFERRAL_GEN_A_NCS
Tetracycline Counseling: Patient counseled regarding possible photosensitivity and increased risk for sunburn.  Patient instructed to avoid sunlight, if possible.  When exposed to sunlight, patients should wear protective clothing, sunglasses, and sunscreen.  The patient was instructed to call the office immediately if the following severe adverse effects occur:  hearing changes, easy bruising/bleeding, severe headache, or vision changes.  The patient verbalized understanding of the proper use and possible adverse effects of tetracycline.  All of the patient's questions and concerns were addressed. Patient understands to avoid pregnancy while on therapy due to potential birth defects. Winlevi Counseling:  I discussed with the patient the risks of topical clascoterone including but not limited to erythema, scaling, itching, and stinging. Patient voiced their understanding. Erythromycin Pregnancy And Lactation Text: This medication is Pregnancy Category B and is considered safe during pregnancy. It is also excreted in breast milk. Aklief counseling:  Patient advised to apply a pea-sized amount only at bedtime and wait 30 minutes after washing their face before applying.  If too drying, patient may add a non-comedogenic moisturizer.  The most commonly reported side effects including irritation, redness, scaling, dryness, stinging, burning, itching, and increased risk of sunburn.  The patient verbalized understanding of the proper use and possible adverse effects of retinoids.  All of the patient's questions and concerns were addressed. Birth Control Pills Counseling: Birth Control Pill Counseling: I discussed with the patient the potential side effects of OCPs including but not limited to increased risk of stroke, heart attack, thrombophlebitis, deep venous thrombosis, hepatic adenomas, breast changes, GI upset, headaches, and depression.  The patient verbalized understanding of the proper use and possible adverse effects of OCPs. All of the patient's questions and concerns were addressed. High Dose Vitamin A Pregnancy And Lactation Text: High dose vitamin A therapy is contraindicated during pregnancy and breast feeding. Bactrim Counseling:  I discussed with the patient the risks of sulfa antibiotics including but not limited to GI upset, allergic reaction, drug rash, diarrhea, dizziness, photosensitivity, and yeast infections.  Rarely, more serious reactions can occur including but not limited to aplastic anemia, agranulocytosis, methemoglobinemia, blood dyscrasias, liver or kidney failure, lung infiltrates or desquamative/blistering drug rashes. Doxycycline Pregnancy And Lactation Text: This medication is Pregnancy Category D and not consider safe during pregnancy. It is also excreted in breast milk but is considered safe for shorter treatment courses. Benzoyl Peroxide Pregnancy And Lactation Text: This medication is Pregnancy Category C. It is unknown if benzoyl peroxide is excreted in breast milk. Topical Retinoid Pregnancy And Lactation Text: This medication is Pregnancy Category C. It is unknown if this medication is excreted in breast milk. Sarecycline Counseling: Patient advised regarding possible photosensitivity and discoloration of the teeth, skin, lips, tongue and gums.  Patient instructed to avoid sunlight, if possible.  When exposed to sunlight, patients should wear protective clothing, sunglasses, and sunscreen.  The patient was instructed to call the office immediately if the following severe adverse effects occur:  hearing changes, easy bruising/bleeding, severe headache, or vision changes.  The patient verbalized understanding of the proper use and possible adverse effects of sarecycline.  All of the patient's questions and concerns were addressed. Azelaic Acid Pregnancy And Lactation Text: This medication is considered safe during pregnancy and breast feeding. Topical Clindamycin Counseling: Patient counseled that this medication may cause skin irritation or allergic reactions.  In the event of skin irritation, the patient was advised to reduce the amount of the drug applied or use it less frequently.   The patient verbalized understanding of the proper use and possible adverse effects of clindamycin.  All of the patient's questions and concerns were addressed. Dapsone Pregnancy And Lactation Text: This medication is Pregnancy Category C and is not considered safe during pregnancy or breast feeding. Azithromycin Counseling:  I discussed with the patient the risks of azithromycin including but not limited to GI upset, allergic reaction, drug rash, diarrhea, and yeast infections. High Dose Vitamin A Counseling: Side effects reviewed, pt to contact office should one occur. Topical Sulfur Applications Counseling: Topical Sulfur Counseling: Patient counseled that this medication may cause skin irritation or allergic reactions.  In the event of skin irritation, the patient was advised to reduce the amount of the drug applied or use it less frequently.   The patient verbalized understanding of the proper use and possible adverse effects of topical sulfur application.  All of the patient's questions and concerns were addressed. Spironolactone Counseling: Patient advised regarding risks of diarrhea, abdominal pain, hyperkalemia, birth defects (for female patients), liver toxicity and renal toxicity. The patient may need blood work to monitor liver and kidney function and potassium levels while on therapy. The patient verbalized understanding of the proper use and possible adverse effects of spironolactone.  All of the patient's questions and concerns were addressed. Use Enhanced Medication Counseling?: No Aklief Pregnancy And Lactation Text: It is unknown if this medication is safe to use during pregnancy.  It is unknown if this medication is excreted in breast milk.  Breastfeeding women should use the topical cream on the smallest area of the skin for the shortest time needed while breastfeeding.  Do not apply to nipple and areola. Topical Retinoid counseling:  Patient advised to apply a pea-sized amount only at bedtime and wait 30 minutes after washing their face before applying.  If too drying, patient may add a non-comedogenic moisturizer. The patient verbalized understanding of the proper use and possible adverse effects of retinoids.  All of the patient's questions and concerns were addressed. Bactrim Pregnancy And Lactation Text: This medication is Pregnancy Category D and is known to cause fetal risk.  It is also excreted in breast milk. Erythromycin Counseling:  I discussed with the patient the risks of erythromycin including but not limited to GI upset, allergic reaction, drug rash, diarrhea, increase in liver enzymes, and yeast infections. Isotretinoin Counseling: Patient should get monthly blood tests, not donate blood, not drive at night if vision affected, not share medication, and not undergo elective surgery for 6 months after tx completed. Side effects reviewed, pt to contact office should one occur. Tazorac Counseling:  Patient advised that medication is irritating and drying.  Patient may need to apply sparingly and wash off after an hour before eventually leaving it on overnight.  The patient verbalized understanding of the proper use and possible adverse effects of tazorac.  All of the patient's questions and concerns were addressed. Winlevi Pregnancy And Lactation Text: This medication is considered safe during pregnancy and breastfeeding. Detail Level: Detailed Tetracycline Pregnancy And Lactation Text: This medication is Pregnancy Category D and not consider safe during pregnancy. It is also excreted in breast milk. Birth Control Pills Pregnancy And Lactation Text: This medication should be avoided if pregnant and for the first 30 days post-partum. Minocycline Counseling: Patient advised regarding possible photosensitivity and discoloration of the teeth, skin, lips, tongue and gums.  Patient instructed to avoid sunlight, if possible.  When exposed to sunlight, patients should wear protective clothing, sunglasses, and sunscreen.  The patient was instructed to call the office immediately if the following severe adverse effects occur:  hearing changes, easy bruising/bleeding, severe headache, or vision changes.  The patient verbalized understanding of the proper use and possible adverse effects of minocycline.  All of the patient's questions and concerns were addressed. Yes Spironolactone Pregnancy And Lactation Text: This medication can cause feminization of the male fetus and should be avoided during pregnancy. The active metabolite is also found in breast milk. Topical Sulfur Applications Pregnancy And Lactation Text: This medication is Pregnancy Category C and has an unknown safety profile during pregnancy. It is unknown if this topical medication is excreted in breast milk. Doxycycline Counseling:  Patient counseled regarding possible photosensitivity and increased risk for sunburn.  Patient instructed to avoid sunlight, if possible.  When exposed to sunlight, patients should wear protective clothing, sunglasses, and sunscreen.  The patient was instructed to call the office immediately if the following severe adverse effects occur:  hearing changes, easy bruising/bleeding, severe headache, or vision changes.  The patient verbalized understanding of the proper use and possible adverse effects of doxycycline.  All of the patient's questions and concerns were addressed. Benzoyl Peroxide Counseling: Patient counseled that medicine may cause skin irritation and bleach clothing.  In the event of skin irritation, the patient was advised to reduce the amount of the drug applied or use it less frequently.   The patient verbalized understanding of the proper use and possible adverse effects of benzoyl peroxide.  All of the patient's questions and concerns were addressed. Azithromycin Pregnancy And Lactation Text: This medication is considered safe during pregnancy and is also secreted in breast milk. Topical Clindamycin Pregnancy And Lactation Text: This medication is Pregnancy Category B and is considered safe during pregnancy. It is unknown if it is excreted in breast milk. Dapsone Counseling: I discussed with the patient the risks of dapsone including but not limited to hemolytic anemia, agranulocytosis, rashes, methemoglobinemia, kidney failure, peripheral neuropathy, headaches, GI upset, and liver toxicity.  Patients who start dapsone require monitoring including baseline LFTs and weekly CBCs for the first month, then every month thereafter.  The patient verbalized understanding of the proper use and possible adverse effects of dapsone.  All of the patient's questions and concerns were addressed. Isotretinoin Pregnancy And Lactation Text: This medication is Pregnancy Category X and is considered extremely dangerous during pregnancy. It is unknown if it is excreted in breast milk. Azelaic Acid Counseling: Patient counseled that medicine may cause skin irritation and to avoid applying near the eyes.  In the event of skin irritation, the patient was advised to reduce the amount of the drug applied or use it less frequently.   The patient verbalized understanding of the proper use and possible adverse effects of azelaic acid.  All of the patient's questions and concerns were addressed. Tazorac Pregnancy And Lactation Text: This medication is not safe during pregnancy. It is unknown if this medication is excreted in breast milk.

## 2024-12-16 ENCOUNTER — APPOINTMENT (OUTPATIENT)
Dept: PULMONOLOGY | Facility: CLINIC | Age: 64
End: 2024-12-16
Payer: MEDICARE

## 2024-12-16 VITALS
TEMPERATURE: 97.6 F | DIASTOLIC BLOOD PRESSURE: 80 MMHG | OXYGEN SATURATION: 99 % | HEART RATE: 70 BPM | SYSTOLIC BLOOD PRESSURE: 156 MMHG

## 2024-12-16 DIAGNOSIS — I44.2 ATRIOVENTRICULAR BLOCK, COMPLETE: ICD-10-CM

## 2024-12-16 DIAGNOSIS — G47.9 SLEEP DISORDER, UNSPECIFIED: ICD-10-CM

## 2024-12-16 DIAGNOSIS — Z95.0 PRESENCE OF CARDIAC PACEMAKER: ICD-10-CM

## 2024-12-16 PROCEDURE — 99203 OFFICE O/P NEW LOW 30 MIN: CPT

## 2024-12-16 RX ORDER — CLONIDINE HYDROCHLORIDE 0.1 MG/1
0.1 TABLET ORAL
Refills: 0 | Status: ACTIVE | COMMUNITY

## 2025-01-22 DIAGNOSIS — G47.33 OBSTRUCTIVE SLEEP APNEA (ADULT) (PEDIATRIC): ICD-10-CM

## 2025-01-26 NOTE — ED ADULT NURSE NOTE - RESPIRATORY RATE (BREATHS/MIN)
Discharge instructions and prescriptions reviewed with pt. Pt verbalized understanding. Pt ambulated out in stable condition.  Assessment unchanged upon discharge.     Beba Escudero RN  01/26/25 [id number]    
18

## 2025-02-25 ENCOUNTER — APPOINTMENT (OUTPATIENT)
Dept: PULMONOLOGY | Facility: CLINIC | Age: 65
End: 2025-02-25
Payer: MEDICARE

## 2025-02-25 VITALS
TEMPERATURE: 97.9 F | DIASTOLIC BLOOD PRESSURE: 76 MMHG | HEIGHT: 67 IN | HEART RATE: 75 BPM | SYSTOLIC BLOOD PRESSURE: 134 MMHG | OXYGEN SATURATION: 99 % | BODY MASS INDEX: 20.91 KG/M2 | WEIGHT: 133.25 LBS

## 2025-02-25 DIAGNOSIS — G47.9 SLEEP DISORDER, UNSPECIFIED: ICD-10-CM

## 2025-02-25 DIAGNOSIS — G40.909 EPILEPSY, UNSPECIFIED, NOT INTRACTABLE, W/OUT STATUS EPILEPTICUS: ICD-10-CM

## 2025-02-25 DIAGNOSIS — G47.33 OBSTRUCTIVE SLEEP APNEA (ADULT) (PEDIATRIC): ICD-10-CM

## 2025-02-25 PROCEDURE — 99214 OFFICE O/P EST MOD 30 MIN: CPT

## 2025-03-25 ENCOUNTER — NON-APPOINTMENT (OUTPATIENT)
Age: 65
End: 2025-03-25

## 2025-03-25 ENCOUNTER — APPOINTMENT (OUTPATIENT)
Dept: PULMONOLOGY | Facility: CLINIC | Age: 65
End: 2025-03-25
Payer: MEDICARE

## 2025-03-25 VITALS
TEMPERATURE: 97 F | SYSTOLIC BLOOD PRESSURE: 116 MMHG | DIASTOLIC BLOOD PRESSURE: 58 MMHG | HEART RATE: 70 BPM | OXYGEN SATURATION: 99 %

## 2025-03-25 DIAGNOSIS — G47.33 OBSTRUCTIVE SLEEP APNEA (ADULT) (PEDIATRIC): ICD-10-CM

## 2025-03-25 PROCEDURE — 99214 OFFICE O/P EST MOD 30 MIN: CPT

## 2025-03-25 PROCEDURE — G2211 COMPLEX E/M VISIT ADD ON: CPT

## 2025-03-27 NOTE — PATIENT PROFILE ADULT. - --S/S CONSISTENT WITH FOLEY CATHETER ASSOCIATED UTI
Infusion tolerated well. Neulasta OnPro kit applied to Upper right arm and is functioning appropriately. AVS provided. Pt and caretaker confirmed 4/15 appt.  
no

## 2025-05-27 ENCOUNTER — APPOINTMENT (OUTPATIENT)
Dept: PULMONOLOGY | Facility: CLINIC | Age: 65
End: 2025-05-27
Payer: MEDICARE

## 2025-05-27 VITALS
OXYGEN SATURATION: 98 % | TEMPERATURE: 97.7 F | BODY MASS INDEX: 20.88 KG/M2 | SYSTOLIC BLOOD PRESSURE: 106 MMHG | HEIGHT: 67 IN | DIASTOLIC BLOOD PRESSURE: 54 MMHG | WEIGHT: 133.06 LBS | HEART RATE: 70 BPM

## 2025-05-27 DIAGNOSIS — G47.9 SLEEP DISORDER, UNSPECIFIED: ICD-10-CM

## 2025-05-27 DIAGNOSIS — G47.33 OBSTRUCTIVE SLEEP APNEA (ADULT) (PEDIATRIC): ICD-10-CM

## 2025-05-27 PROCEDURE — 99214 OFFICE O/P EST MOD 30 MIN: CPT

## 2025-05-27 PROCEDURE — G2211 COMPLEX E/M VISIT ADD ON: CPT

## 2025-07-18 ENCOUNTER — APPOINTMENT (OUTPATIENT)
Dept: ORTHOPEDIC SURGERY | Facility: CLINIC | Age: 65
End: 2025-07-18
Payer: MEDICARE

## 2025-07-18 PROCEDURE — 99204 OFFICE O/P NEW MOD 45 MIN: CPT

## 2025-07-18 PROCEDURE — 73030 X-RAY EXAM OF SHOULDER: CPT | Mod: RT

## 2025-07-18 PROCEDURE — 73010 X-RAY EXAM OF SHOULDER BLADE: CPT | Mod: RT

## 2025-07-18 PROCEDURE — 73000 X-RAY EXAM OF COLLAR BONE: CPT | Mod: RT

## 2025-07-18 RX ORDER — ERGOCALCIFEROL 1.25 MG/1
1.25 MG CAPSULE, LIQUID FILLED ORAL
Qty: 8 | Refills: 0 | Status: ACTIVE | COMMUNITY
Start: 2025-07-18 | End: 1900-01-01

## 2025-07-28 ENCOUNTER — APPOINTMENT (OUTPATIENT)
Dept: PULMONOLOGY | Facility: CLINIC | Age: 65
End: 2025-07-28
Payer: MEDICARE

## 2025-07-28 VITALS
TEMPERATURE: 98.2 F | HEART RATE: 70 BPM | OXYGEN SATURATION: 99 % | DIASTOLIC BLOOD PRESSURE: 80 MMHG | SYSTOLIC BLOOD PRESSURE: 130 MMHG

## 2025-07-28 DIAGNOSIS — G47.33 OBSTRUCTIVE SLEEP APNEA (ADULT) (PEDIATRIC): ICD-10-CM

## 2025-07-28 DIAGNOSIS — G40.909 EPILEPSY, UNSPECIFIED, NOT INTRACTABLE, W/OUT STATUS EPILEPTICUS: ICD-10-CM

## 2025-07-28 PROCEDURE — G2211 COMPLEX E/M VISIT ADD ON: CPT

## 2025-07-28 PROCEDURE — 99214 OFFICE O/P EST MOD 30 MIN: CPT

## 2025-08-01 ENCOUNTER — APPOINTMENT (OUTPATIENT)
Dept: ORTHOPEDIC SURGERY | Facility: CLINIC | Age: 65
End: 2025-08-01
Payer: MEDICARE

## 2025-08-01 DIAGNOSIS — S42.034A NONDISPLACED FRACTURE OF LATERAL END OF RIGHT CLAVICLE, INITIAL ENCOUNTER FOR CLOSED FRACTURE: ICD-10-CM

## 2025-08-01 PROCEDURE — 73000 X-RAY EXAM OF COLLAR BONE: CPT | Mod: RT

## 2025-08-01 PROCEDURE — 99213 OFFICE O/P EST LOW 20 MIN: CPT

## 2025-08-22 ENCOUNTER — APPOINTMENT (OUTPATIENT)
Dept: ORTHOPEDIC SURGERY | Facility: CLINIC | Age: 65
End: 2025-08-22
Payer: MEDICARE

## 2025-08-22 DIAGNOSIS — S42.034A NONDISPLACED FRACTURE OF LATERAL END OF RIGHT CLAVICLE, INITIAL ENCOUNTER FOR CLOSED FRACTURE: ICD-10-CM

## 2025-08-22 PROCEDURE — 73000 X-RAY EXAM OF COLLAR BONE: CPT | Mod: RT

## 2025-08-22 PROCEDURE — 99213 OFFICE O/P EST LOW 20 MIN: CPT

## 2025-09-19 ENCOUNTER — APPOINTMENT (OUTPATIENT)
Dept: ORTHOPEDIC SURGERY | Facility: CLINIC | Age: 65
End: 2025-09-19

## 2025-09-19 DIAGNOSIS — S42.034A NONDISPLACED FRACTURE OF LATERAL END OF RIGHT CLAVICLE, INITIAL ENCOUNTER FOR CLOSED FRACTURE: ICD-10-CM
